# Patient Record
Sex: FEMALE | Race: WHITE | NOT HISPANIC OR LATINO | Employment: OTHER | URBAN - METROPOLITAN AREA
[De-identification: names, ages, dates, MRNs, and addresses within clinical notes are randomized per-mention and may not be internally consistent; named-entity substitution may affect disease eponyms.]

---

## 2017-01-06 ENCOUNTER — HOME CARE/HOSPICE - HEALTHEAST (OUTPATIENT)
Dept: HOME HEALTH SERVICES | Facility: HOME HEALTH | Age: 82
End: 2017-01-06

## 2017-01-09 ENCOUNTER — HOME CARE/HOSPICE - HEALTHEAST (OUTPATIENT)
Dept: HOME HEALTH SERVICES | Facility: HOME HEALTH | Age: 82
End: 2017-01-09

## 2017-01-13 ENCOUNTER — HOME CARE/HOSPICE - HEALTHEAST (OUTPATIENT)
Dept: HOME HEALTH SERVICES | Facility: HOME HEALTH | Age: 82
End: 2017-01-13

## 2017-01-17 ENCOUNTER — HOME CARE/HOSPICE - HEALTHEAST (OUTPATIENT)
Dept: HOME HEALTH SERVICES | Facility: HOME HEALTH | Age: 82
End: 2017-01-17

## 2017-01-20 ENCOUNTER — HOME CARE/HOSPICE - HEALTHEAST (OUTPATIENT)
Dept: HOME HEALTH SERVICES | Facility: HOME HEALTH | Age: 82
End: 2017-01-20

## 2017-01-25 ENCOUNTER — HOME CARE/HOSPICE - HEALTHEAST (OUTPATIENT)
Dept: HOME HEALTH SERVICES | Facility: HOME HEALTH | Age: 82
End: 2017-01-25

## 2017-01-26 ENCOUNTER — HOME CARE/HOSPICE - HEALTHEAST (OUTPATIENT)
Dept: HOME HEALTH SERVICES | Facility: HOME HEALTH | Age: 82
End: 2017-01-26

## 2017-01-31 ENCOUNTER — HOME CARE/HOSPICE - HEALTHEAST (OUTPATIENT)
Dept: HOME HEALTH SERVICES | Facility: HOME HEALTH | Age: 82
End: 2017-01-31

## 2017-02-08 ENCOUNTER — HOME CARE/HOSPICE - HEALTHEAST (OUTPATIENT)
Dept: HOME HEALTH SERVICES | Facility: HOME HEALTH | Age: 82
End: 2017-02-08

## 2017-02-08 ASSESSMENT — MIFFLIN-ST. JEOR: SCORE: 1177.92

## 2017-02-09 ENCOUNTER — HOME CARE/HOSPICE - HEALTHEAST (OUTPATIENT)
Dept: HOME HEALTH SERVICES | Facility: HOME HEALTH | Age: 82
End: 2017-02-09

## 2017-02-10 ENCOUNTER — HOME CARE/HOSPICE - HEALTHEAST (OUTPATIENT)
Dept: HOME HEALTH SERVICES | Facility: HOME HEALTH | Age: 82
End: 2017-02-10

## 2017-02-20 ENCOUNTER — HOME CARE/HOSPICE - HEALTHEAST (OUTPATIENT)
Dept: HOME HEALTH SERVICES | Facility: HOME HEALTH | Age: 82
End: 2017-02-20

## 2017-03-06 ENCOUNTER — HOME CARE/HOSPICE - HEALTHEAST (OUTPATIENT)
Dept: HOME HEALTH SERVICES | Facility: HOME HEALTH | Age: 82
End: 2017-03-06

## 2017-03-07 ENCOUNTER — HOME CARE/HOSPICE - HEALTHEAST (OUTPATIENT)
Dept: HOME HEALTH SERVICES | Facility: HOME HEALTH | Age: 82
End: 2017-03-07

## 2017-03-20 ENCOUNTER — HOME CARE/HOSPICE - HEALTHEAST (OUTPATIENT)
Dept: HOME HEALTH SERVICES | Facility: HOME HEALTH | Age: 82
End: 2017-03-20

## 2017-03-31 ENCOUNTER — HOME CARE/HOSPICE - HEALTHEAST (OUTPATIENT)
Dept: HOME HEALTH SERVICES | Facility: HOME HEALTH | Age: 82
End: 2017-03-31

## 2017-04-05 ENCOUNTER — HOME CARE/HOSPICE - HEALTHEAST (OUTPATIENT)
Dept: HOME HEALTH SERVICES | Facility: HOME HEALTH | Age: 82
End: 2017-04-05

## 2017-04-07 ENCOUNTER — HOME CARE/HOSPICE - HEALTHEAST (OUTPATIENT)
Dept: HOME HEALTH SERVICES | Facility: HOME HEALTH | Age: 82
End: 2017-04-07

## 2017-04-13 ENCOUNTER — HOME CARE/HOSPICE - HEALTHEAST (OUTPATIENT)
Dept: HOME HEALTH SERVICES | Facility: HOME HEALTH | Age: 82
End: 2017-04-13

## 2017-04-14 ENCOUNTER — HOME CARE/HOSPICE - HEALTHEAST (OUTPATIENT)
Dept: HOME HEALTH SERVICES | Facility: HOME HEALTH | Age: 82
End: 2017-04-14

## 2017-04-19 ENCOUNTER — HOME CARE/HOSPICE - HEALTHEAST (OUTPATIENT)
Dept: HOME HEALTH SERVICES | Facility: HOME HEALTH | Age: 82
End: 2017-04-19

## 2017-04-26 ENCOUNTER — HOME CARE/HOSPICE - HEALTHEAST (OUTPATIENT)
Dept: HOME HEALTH SERVICES | Facility: HOME HEALTH | Age: 82
End: 2017-04-26

## 2017-05-08 ENCOUNTER — HOME CARE/HOSPICE - HEALTHEAST (OUTPATIENT)
Dept: HOME HEALTH SERVICES | Facility: HOME HEALTH | Age: 82
End: 2017-05-08

## 2017-05-18 ENCOUNTER — HOME CARE/HOSPICE - HEALTHEAST (OUTPATIENT)
Dept: HOME HEALTH SERVICES | Facility: HOME HEALTH | Age: 82
End: 2017-05-18

## 2017-05-29 ENCOUNTER — HOME CARE/HOSPICE - HEALTHEAST (OUTPATIENT)
Dept: HOME HEALTH SERVICES | Facility: HOME HEALTH | Age: 82
End: 2017-05-29

## 2017-05-30 ENCOUNTER — HOME CARE/HOSPICE - HEALTHEAST (OUTPATIENT)
Dept: HOME HEALTH SERVICES | Facility: HOME HEALTH | Age: 82
End: 2017-05-30

## 2017-06-02 ENCOUNTER — COMMUNICATION - HEALTHEAST (OUTPATIENT)
Dept: HOME HEALTH SERVICES | Facility: HOME HEALTH | Age: 82
End: 2017-06-02

## 2017-06-05 ENCOUNTER — HOME CARE/HOSPICE - HEALTHEAST (OUTPATIENT)
Dept: HOME HEALTH SERVICES | Facility: HOME HEALTH | Age: 82
End: 2017-06-05

## 2017-06-06 ENCOUNTER — HOME CARE/HOSPICE - HEALTHEAST (OUTPATIENT)
Dept: HOME HEALTH SERVICES | Facility: HOME HEALTH | Age: 82
End: 2017-06-06

## 2017-06-15 ENCOUNTER — HOME CARE/HOSPICE - HEALTHEAST (OUTPATIENT)
Dept: HOME HEALTH SERVICES | Facility: HOME HEALTH | Age: 82
End: 2017-06-15

## 2017-06-22 ENCOUNTER — HOME CARE/HOSPICE - HEALTHEAST (OUTPATIENT)
Dept: HOME HEALTH SERVICES | Facility: HOME HEALTH | Age: 82
End: 2017-06-22

## 2017-06-29 ENCOUNTER — HOME CARE/HOSPICE - HEALTHEAST (OUTPATIENT)
Dept: HOME HEALTH SERVICES | Facility: HOME HEALTH | Age: 82
End: 2017-06-29

## 2017-07-11 ENCOUNTER — HOME CARE/HOSPICE - HEALTHEAST (OUTPATIENT)
Dept: HOME HEALTH SERVICES | Facility: HOME HEALTH | Age: 82
End: 2017-07-11

## 2017-07-13 ENCOUNTER — HOME CARE/HOSPICE - HEALTHEAST (OUTPATIENT)
Dept: HOME HEALTH SERVICES | Facility: HOME HEALTH | Age: 82
End: 2017-07-13

## 2017-07-14 ENCOUNTER — HOME CARE/HOSPICE - HEALTHEAST (OUTPATIENT)
Dept: HOME HEALTH SERVICES | Facility: HOME HEALTH | Age: 82
End: 2017-07-14

## 2017-07-17 ENCOUNTER — HOME CARE/HOSPICE - HEALTHEAST (OUTPATIENT)
Dept: HOME HEALTH SERVICES | Facility: HOME HEALTH | Age: 82
End: 2017-07-17

## 2017-07-18 ENCOUNTER — HOME CARE/HOSPICE - HEALTHEAST (OUTPATIENT)
Dept: HOME HEALTH SERVICES | Facility: HOME HEALTH | Age: 82
End: 2017-07-18

## 2017-07-18 ENCOUNTER — COMMUNICATION - HEALTHEAST (OUTPATIENT)
Dept: PHYSICAL THERAPY | Age: 82
End: 2017-07-18

## 2017-07-21 ENCOUNTER — HOME CARE/HOSPICE - HEALTHEAST (OUTPATIENT)
Dept: HOME HEALTH SERVICES | Facility: HOME HEALTH | Age: 82
End: 2017-07-21

## 2017-07-24 ENCOUNTER — HOME CARE/HOSPICE - HEALTHEAST (OUTPATIENT)
Dept: HOME HEALTH SERVICES | Facility: HOME HEALTH | Age: 82
End: 2017-07-24

## 2017-07-26 ENCOUNTER — HOME CARE/HOSPICE - HEALTHEAST (OUTPATIENT)
Dept: HOME HEALTH SERVICES | Facility: HOME HEALTH | Age: 82
End: 2017-07-26

## 2017-07-27 ENCOUNTER — HOME CARE/HOSPICE - HEALTHEAST (OUTPATIENT)
Dept: HOME HEALTH SERVICES | Facility: HOME HEALTH | Age: 82
End: 2017-07-27

## 2017-07-28 ENCOUNTER — HOME CARE/HOSPICE - HEALTHEAST (OUTPATIENT)
Dept: HOME HEALTH SERVICES | Facility: HOME HEALTH | Age: 82
End: 2017-07-28

## 2017-08-01 ENCOUNTER — HOME CARE/HOSPICE - HEALTHEAST (OUTPATIENT)
Dept: HOME HEALTH SERVICES | Facility: HOME HEALTH | Age: 82
End: 2017-08-01

## 2017-08-02 ENCOUNTER — HOME CARE/HOSPICE - HEALTHEAST (OUTPATIENT)
Dept: HOME HEALTH SERVICES | Facility: HOME HEALTH | Age: 82
End: 2017-08-02

## 2017-08-03 ENCOUNTER — HOME CARE/HOSPICE - HEALTHEAST (OUTPATIENT)
Dept: HOME HEALTH SERVICES | Facility: HOME HEALTH | Age: 82
End: 2017-08-03

## 2017-08-04 ENCOUNTER — HOME CARE/HOSPICE - HEALTHEAST (OUTPATIENT)
Dept: HOME HEALTH SERVICES | Facility: HOME HEALTH | Age: 82
End: 2017-08-04

## 2017-08-07 ENCOUNTER — HOME CARE/HOSPICE - HEALTHEAST (OUTPATIENT)
Dept: HOME HEALTH SERVICES | Facility: HOME HEALTH | Age: 82
End: 2017-08-07

## 2017-08-09 ENCOUNTER — HOME CARE/HOSPICE - HEALTHEAST (OUTPATIENT)
Dept: HOME HEALTH SERVICES | Facility: HOME HEALTH | Age: 82
End: 2017-08-09

## 2017-08-10 ENCOUNTER — HOME CARE/HOSPICE - HEALTHEAST (OUTPATIENT)
Dept: HOME HEALTH SERVICES | Facility: HOME HEALTH | Age: 82
End: 2017-08-10

## 2017-08-17 ENCOUNTER — HOME CARE/HOSPICE - HEALTHEAST (OUTPATIENT)
Dept: HOME HEALTH SERVICES | Facility: HOME HEALTH | Age: 82
End: 2017-08-17

## 2017-08-24 ENCOUNTER — HOME CARE/HOSPICE - HEALTHEAST (OUTPATIENT)
Dept: HOME HEALTH SERVICES | Facility: HOME HEALTH | Age: 82
End: 2017-08-24

## 2017-08-25 ENCOUNTER — HOME CARE/HOSPICE - HEALTHEAST (OUTPATIENT)
Dept: HOME HEALTH SERVICES | Facility: HOME HEALTH | Age: 82
End: 2017-08-25

## 2017-08-31 ENCOUNTER — HOME CARE/HOSPICE - HEALTHEAST (OUTPATIENT)
Dept: HOME HEALTH SERVICES | Facility: HOME HEALTH | Age: 82
End: 2017-08-31

## 2017-09-01 ENCOUNTER — HOME CARE/HOSPICE - HEALTHEAST (OUTPATIENT)
Dept: HOME HEALTH SERVICES | Facility: HOME HEALTH | Age: 82
End: 2017-09-01

## 2017-09-02 ENCOUNTER — HOME CARE/HOSPICE - HEALTHEAST (OUTPATIENT)
Dept: HOME HEALTH SERVICES | Facility: HOME HEALTH | Age: 82
End: 2017-09-02

## 2017-09-05 ENCOUNTER — HOME CARE/HOSPICE - HEALTHEAST (OUTPATIENT)
Dept: HOME HEALTH SERVICES | Facility: HOME HEALTH | Age: 82
End: 2017-09-05

## 2017-09-06 ENCOUNTER — COMMUNICATION - HEALTHEAST (OUTPATIENT)
Dept: PHYSICAL THERAPY | Age: 82
End: 2017-09-06

## 2017-09-06 ENCOUNTER — HOME CARE/HOSPICE - HEALTHEAST (OUTPATIENT)
Dept: HOME HEALTH SERVICES | Facility: HOME HEALTH | Age: 82
End: 2017-09-06

## 2017-09-07 ENCOUNTER — HOME CARE/HOSPICE - HEALTHEAST (OUTPATIENT)
Dept: HOME HEALTH SERVICES | Facility: HOME HEALTH | Age: 82
End: 2017-09-07

## 2017-09-12 ENCOUNTER — HOME CARE/HOSPICE - HEALTHEAST (OUTPATIENT)
Dept: HOME HEALTH SERVICES | Facility: HOME HEALTH | Age: 82
End: 2017-09-12

## 2017-09-15 ENCOUNTER — HOME CARE/HOSPICE - HEALTHEAST (OUTPATIENT)
Dept: HOME HEALTH SERVICES | Facility: HOME HEALTH | Age: 82
End: 2017-09-15

## 2017-09-19 ENCOUNTER — HOME CARE/HOSPICE - HEALTHEAST (OUTPATIENT)
Dept: HOME HEALTH SERVICES | Facility: HOME HEALTH | Age: 82
End: 2017-09-19

## 2017-09-20 ENCOUNTER — HOME CARE/HOSPICE - HEALTHEAST (OUTPATIENT)
Dept: HOME HEALTH SERVICES | Facility: HOME HEALTH | Age: 82
End: 2017-09-20

## 2017-09-22 ENCOUNTER — HOME CARE/HOSPICE - HEALTHEAST (OUTPATIENT)
Dept: HOME HEALTH SERVICES | Facility: HOME HEALTH | Age: 82
End: 2017-09-22

## 2017-09-25 ENCOUNTER — HOME CARE/HOSPICE - HEALTHEAST (OUTPATIENT)
Dept: HOME HEALTH SERVICES | Facility: HOME HEALTH | Age: 82
End: 2017-09-25

## 2017-09-26 ENCOUNTER — HOME CARE/HOSPICE - HEALTHEAST (OUTPATIENT)
Dept: HOME HEALTH SERVICES | Facility: HOME HEALTH | Age: 82
End: 2017-09-26

## 2017-09-28 ENCOUNTER — HOME CARE/HOSPICE - HEALTHEAST (OUTPATIENT)
Dept: HOME HEALTH SERVICES | Facility: HOME HEALTH | Age: 82
End: 2017-09-28

## 2017-10-02 ENCOUNTER — HOME CARE/HOSPICE - HEALTHEAST (OUTPATIENT)
Dept: HOME HEALTH SERVICES | Facility: HOME HEALTH | Age: 82
End: 2017-10-02

## 2017-10-16 ENCOUNTER — HOME CARE/HOSPICE - HEALTHEAST (OUTPATIENT)
Dept: HOME HEALTH SERVICES | Facility: HOME HEALTH | Age: 82
End: 2017-10-16

## 2017-10-20 ENCOUNTER — HOME CARE/HOSPICE - HEALTHEAST (OUTPATIENT)
Dept: HOME HEALTH SERVICES | Facility: HOME HEALTH | Age: 82
End: 2017-10-20

## 2017-10-21 ENCOUNTER — COMMUNICATION - HEALTHEAST (OUTPATIENT)
Dept: INTERNAL MEDICINE | Facility: CLINIC | Age: 82
End: 2017-10-21

## 2017-10-30 ENCOUNTER — HOME CARE/HOSPICE - HEALTHEAST (OUTPATIENT)
Dept: HOME HEALTH SERVICES | Facility: HOME HEALTH | Age: 82
End: 2017-10-30

## 2017-10-31 ENCOUNTER — HOME CARE/HOSPICE - HEALTHEAST (OUTPATIENT)
Dept: HOME HEALTH SERVICES | Facility: HOME HEALTH | Age: 82
End: 2017-10-31

## 2017-11-13 ENCOUNTER — HOME CARE/HOSPICE - HEALTHEAST (OUTPATIENT)
Dept: HOME HEALTH SERVICES | Facility: HOME HEALTH | Age: 82
End: 2017-11-13

## 2017-11-27 ENCOUNTER — HOME CARE/HOSPICE - HEALTHEAST (OUTPATIENT)
Dept: HOME HEALTH SERVICES | Facility: HOME HEALTH | Age: 82
End: 2017-11-27

## 2017-11-28 ENCOUNTER — HOME CARE/HOSPICE - HEALTHEAST (OUTPATIENT)
Dept: HOME HEALTH SERVICES | Facility: HOME HEALTH | Age: 82
End: 2017-11-28

## 2017-12-05 ENCOUNTER — HOME CARE/HOSPICE - HEALTHEAST (OUTPATIENT)
Dept: HOME HEALTH SERVICES | Facility: HOME HEALTH | Age: 82
End: 2017-12-05

## 2017-12-08 ENCOUNTER — HOME CARE/HOSPICE - HEALTHEAST (OUTPATIENT)
Dept: HOME HEALTH SERVICES | Facility: HOME HEALTH | Age: 82
End: 2017-12-08

## 2017-12-11 ENCOUNTER — HOME CARE/HOSPICE - HEALTHEAST (OUTPATIENT)
Dept: HOME HEALTH SERVICES | Facility: HOME HEALTH | Age: 82
End: 2017-12-11

## 2017-12-12 ENCOUNTER — HOME CARE/HOSPICE - HEALTHEAST (OUTPATIENT)
Dept: HOME HEALTH SERVICES | Facility: HOME HEALTH | Age: 82
End: 2017-12-12

## 2017-12-14 ENCOUNTER — HOME CARE/HOSPICE - HEALTHEAST (OUTPATIENT)
Dept: HOME HEALTH SERVICES | Facility: HOME HEALTH | Age: 82
End: 2017-12-14

## 2017-12-17 ENCOUNTER — HOME CARE/HOSPICE - HEALTHEAST (OUTPATIENT)
Dept: HOME HEALTH SERVICES | Facility: HOME HEALTH | Age: 82
End: 2017-12-17

## 2017-12-18 ENCOUNTER — HOME CARE/HOSPICE - HEALTHEAST (OUTPATIENT)
Dept: HOME HEALTH SERVICES | Facility: HOME HEALTH | Age: 82
End: 2017-12-18

## 2017-12-22 ENCOUNTER — HOME CARE/HOSPICE - HEALTHEAST (OUTPATIENT)
Dept: HOME HEALTH SERVICES | Facility: HOME HEALTH | Age: 82
End: 2017-12-22

## 2017-12-23 ENCOUNTER — HOME CARE/HOSPICE - HEALTHEAST (OUTPATIENT)
Dept: HOME HEALTH SERVICES | Facility: HOME HEALTH | Age: 82
End: 2017-12-23

## 2017-12-27 ENCOUNTER — HOME CARE/HOSPICE - HEALTHEAST (OUTPATIENT)
Dept: HOME HEALTH SERVICES | Facility: HOME HEALTH | Age: 82
End: 2017-12-27

## 2017-12-28 ENCOUNTER — HOME CARE/HOSPICE - HEALTHEAST (OUTPATIENT)
Dept: HOME HEALTH SERVICES | Facility: HOME HEALTH | Age: 82
End: 2017-12-28

## 2018-01-04 ENCOUNTER — HOME CARE/HOSPICE - HEALTHEAST (OUTPATIENT)
Dept: HOME HEALTH SERVICES | Facility: HOME HEALTH | Age: 83
End: 2018-01-04

## 2018-01-08 ENCOUNTER — HOME CARE/HOSPICE - HEALTHEAST (OUTPATIENT)
Dept: HOME HEALTH SERVICES | Facility: HOME HEALTH | Age: 83
End: 2018-01-08

## 2018-01-23 ENCOUNTER — HOME CARE/HOSPICE - HEALTHEAST (OUTPATIENT)
Dept: HOME HEALTH SERVICES | Facility: HOME HEALTH | Age: 83
End: 2018-01-23

## 2018-02-01 ENCOUNTER — HOME CARE/HOSPICE - HEALTHEAST (OUTPATIENT)
Dept: HOME HEALTH SERVICES | Facility: HOME HEALTH | Age: 83
End: 2018-02-01

## 2018-02-12 ENCOUNTER — HOME CARE/HOSPICE - HEALTHEAST (OUTPATIENT)
Dept: HOME HEALTH SERVICES | Facility: HOME HEALTH | Age: 83
End: 2018-02-12

## 2018-02-26 ENCOUNTER — HOME CARE/HOSPICE - HEALTHEAST (OUTPATIENT)
Dept: HOME HEALTH SERVICES | Facility: HOME HEALTH | Age: 83
End: 2018-02-26

## 2018-03-12 ENCOUNTER — HOME CARE/HOSPICE - HEALTHEAST (OUTPATIENT)
Dept: HOME HEALTH SERVICES | Facility: HOME HEALTH | Age: 83
End: 2018-03-12

## 2018-03-13 ENCOUNTER — RECORDS - HEALTHEAST (OUTPATIENT)
Dept: LAB | Facility: HOSPITAL | Age: 83
End: 2018-03-13

## 2018-03-13 ENCOUNTER — HOME CARE/HOSPICE - HEALTHEAST (OUTPATIENT)
Dept: HOME HEALTH SERVICES | Facility: HOME HEALTH | Age: 83
End: 2018-03-13

## 2018-03-14 LAB — BACTERIA SPEC CULT: NO GROWTH

## 2018-03-26 ENCOUNTER — HOME CARE/HOSPICE - HEALTHEAST (OUTPATIENT)
Dept: HOME HEALTH SERVICES | Facility: HOME HEALTH | Age: 83
End: 2018-03-26

## 2018-03-29 ENCOUNTER — HOME CARE/HOSPICE - HEALTHEAST (OUTPATIENT)
Dept: HOME HEALTH SERVICES | Facility: HOME HEALTH | Age: 83
End: 2018-03-29

## 2018-04-04 ENCOUNTER — HOME CARE/HOSPICE - HEALTHEAST (OUTPATIENT)
Dept: HOME HEALTH SERVICES | Facility: HOME HEALTH | Age: 83
End: 2018-04-04

## 2018-04-05 ENCOUNTER — HOME CARE/HOSPICE - HEALTHEAST (OUTPATIENT)
Dept: HOME HEALTH SERVICES | Facility: HOME HEALTH | Age: 83
End: 2018-04-05

## 2018-04-09 ENCOUNTER — HOME CARE/HOSPICE - HEALTHEAST (OUTPATIENT)
Dept: HOME HEALTH SERVICES | Facility: HOME HEALTH | Age: 83
End: 2018-04-09

## 2018-04-10 ENCOUNTER — HOME CARE/HOSPICE - HEALTHEAST (OUTPATIENT)
Dept: HOME HEALTH SERVICES | Facility: HOME HEALTH | Age: 83
End: 2018-04-10

## 2018-04-18 ENCOUNTER — HOME CARE/HOSPICE - HEALTHEAST (OUTPATIENT)
Dept: HOME HEALTH SERVICES | Facility: HOME HEALTH | Age: 83
End: 2018-04-18

## 2018-04-23 ENCOUNTER — HOME CARE/HOSPICE - HEALTHEAST (OUTPATIENT)
Dept: HOME HEALTH SERVICES | Facility: HOME HEALTH | Age: 83
End: 2018-04-23

## 2018-05-07 ENCOUNTER — HOME CARE/HOSPICE - HEALTHEAST (OUTPATIENT)
Dept: HOME HEALTH SERVICES | Facility: HOME HEALTH | Age: 83
End: 2018-05-07

## 2018-05-15 ENCOUNTER — HOME CARE/HOSPICE - HEALTHEAST (OUTPATIENT)
Dept: HOME HEALTH SERVICES | Facility: HOME HEALTH | Age: 83
End: 2018-05-15

## 2018-05-21 ENCOUNTER — HOME CARE/HOSPICE - HEALTHEAST (OUTPATIENT)
Dept: HOME HEALTH SERVICES | Facility: HOME HEALTH | Age: 83
End: 2018-05-21

## 2018-06-04 ENCOUNTER — HOME CARE/HOSPICE - HEALTHEAST (OUTPATIENT)
Dept: HOME HEALTH SERVICES | Facility: HOME HEALTH | Age: 83
End: 2018-06-04

## 2018-06-07 ENCOUNTER — HOME CARE/HOSPICE - HEALTHEAST (OUTPATIENT)
Dept: HOME HEALTH SERVICES | Facility: HOME HEALTH | Age: 83
End: 2018-06-07

## 2018-06-08 ENCOUNTER — HOME CARE/HOSPICE - HEALTHEAST (OUTPATIENT)
Dept: HOME HEALTH SERVICES | Facility: HOME HEALTH | Age: 83
End: 2018-06-08

## 2018-06-13 ENCOUNTER — HOME CARE/HOSPICE - HEALTHEAST (OUTPATIENT)
Dept: HOME HEALTH SERVICES | Facility: HOME HEALTH | Age: 83
End: 2018-06-13

## 2018-06-13 ENCOUNTER — COMMUNICATION - HEALTHEAST (OUTPATIENT)
Dept: INTERNAL MEDICINE | Facility: CLINIC | Age: 83
End: 2018-06-13

## 2018-06-20 ENCOUNTER — HOME CARE/HOSPICE - HEALTHEAST (OUTPATIENT)
Dept: HOME HEALTH SERVICES | Facility: HOME HEALTH | Age: 83
End: 2018-06-20

## 2018-06-20 ENCOUNTER — COMMUNICATION - HEALTHEAST (OUTPATIENT)
Dept: INTERNAL MEDICINE | Facility: CLINIC | Age: 83
End: 2018-06-20

## 2018-06-21 ENCOUNTER — HOME CARE/HOSPICE - HEALTHEAST (OUTPATIENT)
Dept: HOME HEALTH SERVICES | Facility: HOME HEALTH | Age: 83
End: 2018-06-21

## 2018-07-03 ENCOUNTER — HOME CARE/HOSPICE - HEALTHEAST (OUTPATIENT)
Dept: HOME HEALTH SERVICES | Facility: HOME HEALTH | Age: 83
End: 2018-07-03

## 2018-07-11 ENCOUNTER — HOME CARE/HOSPICE - HEALTHEAST (OUTPATIENT)
Dept: HOME HEALTH SERVICES | Facility: HOME HEALTH | Age: 83
End: 2018-07-11

## 2018-07-12 ENCOUNTER — HOME CARE/HOSPICE - HEALTHEAST (OUTPATIENT)
Dept: HOME HEALTH SERVICES | Facility: HOME HEALTH | Age: 83
End: 2018-07-12

## 2018-07-13 ENCOUNTER — HOME CARE/HOSPICE - HEALTHEAST (OUTPATIENT)
Dept: HOME HEALTH SERVICES | Facility: HOME HEALTH | Age: 83
End: 2018-07-13

## 2018-07-14 ENCOUNTER — HOME CARE/HOSPICE - HEALTHEAST (OUTPATIENT)
Dept: HOME HEALTH SERVICES | Facility: HOME HEALTH | Age: 83
End: 2018-07-14

## 2018-07-17 ENCOUNTER — HOME CARE/HOSPICE - HEALTHEAST (OUTPATIENT)
Dept: HOME HEALTH SERVICES | Facility: HOME HEALTH | Age: 83
End: 2018-07-17

## 2018-07-17 ENCOUNTER — RECORDS - HEALTHEAST (OUTPATIENT)
Dept: ADMINISTRATIVE | Facility: OTHER | Age: 83
End: 2018-07-17

## 2018-07-20 ENCOUNTER — HOME CARE/HOSPICE - HEALTHEAST (OUTPATIENT)
Dept: HOME HEALTH SERVICES | Facility: HOME HEALTH | Age: 83
End: 2018-07-20

## 2018-07-22 ENCOUNTER — HOME CARE/HOSPICE - HEALTHEAST (OUTPATIENT)
Dept: HOME HEALTH SERVICES | Facility: HOME HEALTH | Age: 83
End: 2018-07-22

## 2018-07-27 ENCOUNTER — HOME CARE/HOSPICE - HEALTHEAST (OUTPATIENT)
Dept: HOME HEALTH SERVICES | Facility: HOME HEALTH | Age: 83
End: 2018-07-27

## 2018-07-30 ENCOUNTER — HOME CARE/HOSPICE - HEALTHEAST (OUTPATIENT)
Dept: HOME HEALTH SERVICES | Facility: HOME HEALTH | Age: 83
End: 2018-07-30

## 2018-07-31 ENCOUNTER — HOME CARE/HOSPICE - HEALTHEAST (OUTPATIENT)
Dept: HOME HEALTH SERVICES | Facility: HOME HEALTH | Age: 83
End: 2018-07-31

## 2018-08-01 ENCOUNTER — HOME CARE/HOSPICE - HEALTHEAST (OUTPATIENT)
Dept: HOME HEALTH SERVICES | Facility: HOME HEALTH | Age: 83
End: 2018-08-01

## 2018-08-03 ENCOUNTER — HOME CARE/HOSPICE - HEALTHEAST (OUTPATIENT)
Dept: HOME HEALTH SERVICES | Facility: HOME HEALTH | Age: 83
End: 2018-08-03

## 2018-08-06 ENCOUNTER — HOME CARE/HOSPICE - HEALTHEAST (OUTPATIENT)
Dept: HOME HEALTH SERVICES | Facility: HOME HEALTH | Age: 83
End: 2018-08-06

## 2018-08-08 ENCOUNTER — HOME CARE/HOSPICE - HEALTHEAST (OUTPATIENT)
Dept: HOME HEALTH SERVICES | Facility: HOME HEALTH | Age: 83
End: 2018-08-08

## 2018-08-10 ENCOUNTER — HOME CARE/HOSPICE - HEALTHEAST (OUTPATIENT)
Dept: HOME HEALTH SERVICES | Facility: HOME HEALTH | Age: 83
End: 2018-08-10

## 2018-08-13 ENCOUNTER — HOME CARE/HOSPICE - HEALTHEAST (OUTPATIENT)
Dept: HOME HEALTH SERVICES | Facility: HOME HEALTH | Age: 83
End: 2018-08-13

## 2018-08-14 ENCOUNTER — HOME CARE/HOSPICE - HEALTHEAST (OUTPATIENT)
Dept: HOME HEALTH SERVICES | Facility: HOME HEALTH | Age: 83
End: 2018-08-14

## 2018-08-16 ENCOUNTER — HOME CARE/HOSPICE - HEALTHEAST (OUTPATIENT)
Dept: HOME HEALTH SERVICES | Facility: HOME HEALTH | Age: 83
End: 2018-08-16

## 2018-08-20 ENCOUNTER — HOME CARE/HOSPICE - HEALTHEAST (OUTPATIENT)
Dept: HOME HEALTH SERVICES | Facility: HOME HEALTH | Age: 83
End: 2018-08-20

## 2018-08-21 ENCOUNTER — HOME CARE/HOSPICE - HEALTHEAST (OUTPATIENT)
Dept: HOME HEALTH SERVICES | Facility: HOME HEALTH | Age: 83
End: 2018-08-21

## 2018-08-24 ENCOUNTER — HOME CARE/HOSPICE - HEALTHEAST (OUTPATIENT)
Dept: HOME HEALTH SERVICES | Facility: HOME HEALTH | Age: 83
End: 2018-08-24

## 2018-08-29 ENCOUNTER — HOME CARE/HOSPICE - HEALTHEAST (OUTPATIENT)
Dept: HOME HEALTH SERVICES | Facility: HOME HEALTH | Age: 83
End: 2018-08-29

## 2018-09-03 ENCOUNTER — HOME CARE/HOSPICE - HEALTHEAST (OUTPATIENT)
Dept: HOME HEALTH SERVICES | Facility: HOME HEALTH | Age: 83
End: 2018-09-03

## 2018-09-04 ENCOUNTER — HOME CARE/HOSPICE - HEALTHEAST (OUTPATIENT)
Dept: HOME HEALTH SERVICES | Facility: HOME HEALTH | Age: 83
End: 2018-09-04

## 2018-09-06 ENCOUNTER — HOME CARE/HOSPICE - HEALTHEAST (OUTPATIENT)
Dept: HOME HEALTH SERVICES | Facility: HOME HEALTH | Age: 83
End: 2018-09-06

## 2018-09-07 ENCOUNTER — HOME CARE/HOSPICE - HEALTHEAST (OUTPATIENT)
Dept: HOME HEALTH SERVICES | Facility: HOME HEALTH | Age: 83
End: 2018-09-07

## 2018-09-17 ENCOUNTER — HOME CARE/HOSPICE - HEALTHEAST (OUTPATIENT)
Dept: HOME HEALTH SERVICES | Facility: HOME HEALTH | Age: 83
End: 2018-09-17

## 2018-10-01 ENCOUNTER — HOME CARE/HOSPICE - HEALTHEAST (OUTPATIENT)
Dept: HOME HEALTH SERVICES | Facility: HOME HEALTH | Age: 83
End: 2018-10-01

## 2018-10-15 ENCOUNTER — HOME CARE/HOSPICE - HEALTHEAST (OUTPATIENT)
Dept: HOME HEALTH SERVICES | Facility: HOME HEALTH | Age: 83
End: 2018-10-15

## 2018-10-19 ENCOUNTER — HOME CARE/HOSPICE - HEALTHEAST (OUTPATIENT)
Dept: HOME HEALTH SERVICES | Facility: HOME HEALTH | Age: 83
End: 2018-10-19

## 2018-10-29 ENCOUNTER — HOME CARE/HOSPICE - HEALTHEAST (OUTPATIENT)
Dept: HOME HEALTH SERVICES | Facility: HOME HEALTH | Age: 83
End: 2018-10-29

## 2018-11-12 ENCOUNTER — HOME CARE/HOSPICE - HEALTHEAST (OUTPATIENT)
Dept: HOME HEALTH SERVICES | Facility: HOME HEALTH | Age: 83
End: 2018-11-12

## 2018-11-26 ENCOUNTER — HOME CARE/HOSPICE - HEALTHEAST (OUTPATIENT)
Dept: HOME HEALTH SERVICES | Facility: HOME HEALTH | Age: 83
End: 2018-11-26

## 2018-12-10 ENCOUNTER — HOME CARE/HOSPICE - HEALTHEAST (OUTPATIENT)
Dept: HOME HEALTH SERVICES | Facility: HOME HEALTH | Age: 83
End: 2018-12-10

## 2018-12-24 ENCOUNTER — HOME CARE/HOSPICE - HEALTHEAST (OUTPATIENT)
Dept: HOME HEALTH SERVICES | Facility: HOME HEALTH | Age: 83
End: 2018-12-24

## 2019-01-07 ENCOUNTER — HOME CARE/HOSPICE - HEALTHEAST (OUTPATIENT)
Dept: HOME HEALTH SERVICES | Facility: HOME HEALTH | Age: 84
End: 2019-01-07

## 2019-01-11 ENCOUNTER — HOME CARE/HOSPICE - HEALTHEAST (OUTPATIENT)
Dept: HOME HEALTH SERVICES | Facility: HOME HEALTH | Age: 84
End: 2019-01-11

## 2019-01-21 ENCOUNTER — HOME CARE/HOSPICE - HEALTHEAST (OUTPATIENT)
Dept: HOME HEALTH SERVICES | Facility: HOME HEALTH | Age: 84
End: 2019-01-21

## 2019-01-28 ENCOUNTER — HOME CARE/HOSPICE - HEALTHEAST (OUTPATIENT)
Dept: HOME HEALTH SERVICES | Facility: HOME HEALTH | Age: 84
End: 2019-01-28

## 2019-02-04 ENCOUNTER — HOME CARE/HOSPICE - HEALTHEAST (OUTPATIENT)
Dept: HOME HEALTH SERVICES | Facility: HOME HEALTH | Age: 84
End: 2019-02-04

## 2019-02-10 ENCOUNTER — HOME CARE/HOSPICE - HEALTHEAST (OUTPATIENT)
Dept: HOME HEALTH SERVICES | Facility: HOME HEALTH | Age: 84
End: 2019-02-10

## 2019-02-16 ENCOUNTER — HOME CARE/HOSPICE - HEALTHEAST (OUTPATIENT)
Dept: HOME HEALTH SERVICES | Facility: HOME HEALTH | Age: 84
End: 2019-02-16

## 2019-02-18 ENCOUNTER — HOME CARE/HOSPICE - HEALTHEAST (OUTPATIENT)
Dept: HOME HEALTH SERVICES | Facility: HOME HEALTH | Age: 84
End: 2019-02-18

## 2019-03-04 ENCOUNTER — HOME CARE/HOSPICE - HEALTHEAST (OUTPATIENT)
Dept: HOME HEALTH SERVICES | Facility: HOME HEALTH | Age: 84
End: 2019-03-04

## 2019-03-08 ENCOUNTER — HOME CARE/HOSPICE - HEALTHEAST (OUTPATIENT)
Dept: HOME HEALTH SERVICES | Facility: HOME HEALTH | Age: 84
End: 2019-03-08

## 2019-03-18 ENCOUNTER — HOME CARE/HOSPICE - HEALTHEAST (OUTPATIENT)
Dept: HOME HEALTH SERVICES | Facility: HOME HEALTH | Age: 84
End: 2019-03-18

## 2019-04-01 ENCOUNTER — HOME CARE/HOSPICE - HEALTHEAST (OUTPATIENT)
Dept: HOME HEALTH SERVICES | Facility: HOME HEALTH | Age: 84
End: 2019-04-01

## 2019-04-01 ENCOUNTER — RECORDS - HEALTHEAST (OUTPATIENT)
Dept: ADMINISTRATIVE | Facility: OTHER | Age: 84
End: 2019-04-01

## 2019-04-13 ENCOUNTER — HOME CARE/HOSPICE - HEALTHEAST (OUTPATIENT)
Dept: HOME HEALTH SERVICES | Facility: HOME HEALTH | Age: 84
End: 2019-04-13

## 2019-04-14 ENCOUNTER — HOME CARE/HOSPICE - HEALTHEAST (OUTPATIENT)
Dept: HOME HEALTH SERVICES | Facility: HOME HEALTH | Age: 84
End: 2019-04-14

## 2019-04-15 ENCOUNTER — HOME CARE/HOSPICE - HEALTHEAST (OUTPATIENT)
Dept: HOME HEALTH SERVICES | Facility: HOME HEALTH | Age: 84
End: 2019-04-15

## 2019-04-16 ENCOUNTER — RECORDS - HEALTHEAST (OUTPATIENT)
Dept: LAB | Facility: CLINIC | Age: 84
End: 2019-04-16

## 2019-04-16 LAB
ALBUMIN UR-MCNC: ABNORMAL MG/DL
APPEARANCE UR: ABNORMAL
BILIRUB UR QL STRIP: NEGATIVE
COLOR UR AUTO: ABNORMAL
GLUCOSE UR STRIP-MCNC: NEGATIVE MG/DL
HGB UR QL STRIP: ABNORMAL
KETONES UR STRIP-MCNC: ABNORMAL MG/DL
LEUKOCYTE ESTERASE UR QL STRIP: ABNORMAL
NITRATE UR QL: NEGATIVE
PH UR STRIP: 6 [PH] (ref 4.5–8)
SP GR UR STRIP: 1.02 (ref 1–1.03)
UROBILINOGEN UR STRIP-ACNC: ABNORMAL

## 2019-04-17 ENCOUNTER — RECORDS - HEALTHEAST (OUTPATIENT)
Dept: LAB | Facility: CLINIC | Age: 84
End: 2019-04-17

## 2019-04-17 LAB
ANION GAP SERPL CALCULATED.3IONS-SCNC: 10 MMOL/L (ref 5–18)
BUN SERPL-MCNC: 18 MG/DL (ref 8–28)
CALCIUM SERPL-MCNC: 9.8 MG/DL (ref 8.5–10.5)
CHLORIDE BLD-SCNC: 100 MMOL/L (ref 98–107)
CO2 SERPL-SCNC: 26 MMOL/L (ref 22–31)
CREAT SERPL-MCNC: 0.91 MG/DL (ref 0.6–1.1)
ERYTHROCYTE [DISTWIDTH] IN BLOOD BY AUTOMATED COUNT: 13.8 % (ref 11–14.5)
GFR SERPL CREATININE-BSD FRML MDRD: 58 ML/MIN/1.73M2
GLUCOSE BLD-MCNC: 88 MG/DL (ref 70–125)
HCT VFR BLD AUTO: 37.4 % (ref 35–47)
HGB BLD-MCNC: 12.4 G/DL (ref 12–16)
MCH RBC QN AUTO: 31 PG (ref 27–34)
MCHC RBC AUTO-ENTMCNC: 33.2 G/DL (ref 32–36)
MCV RBC AUTO: 94 FL (ref 80–100)
PLATELET # BLD AUTO: 245 THOU/UL (ref 140–440)
PMV BLD AUTO: 9.4 FL (ref 8.5–12.5)
POTASSIUM BLD-SCNC: 3.6 MMOL/L (ref 3.5–5)
RBC # BLD AUTO: 4 MILL/UL (ref 3.8–5.4)
SODIUM SERPL-SCNC: 136 MMOL/L (ref 136–145)
WBC: 4.3 THOU/UL (ref 4–11)

## 2019-04-18 LAB — BACTERIA SPEC CULT: ABNORMAL

## 2019-04-22 ENCOUNTER — AMBULATORY - HEALTHEAST (OUTPATIENT)
Dept: OTHER | Facility: CLINIC | Age: 84
End: 2019-04-22

## 2019-04-23 ENCOUNTER — HOME CARE/HOSPICE - HEALTHEAST (OUTPATIENT)
Dept: HOME HEALTH SERVICES | Facility: HOME HEALTH | Age: 84
End: 2019-04-23

## 2019-04-26 ENCOUNTER — COMMUNICATION - HEALTHEAST (OUTPATIENT)
Dept: SCHEDULING | Facility: CLINIC | Age: 84
End: 2019-04-26

## 2019-04-26 ENCOUNTER — HOME CARE/HOSPICE - HEALTHEAST (OUTPATIENT)
Dept: HOME HEALTH SERVICES | Facility: HOME HEALTH | Age: 84
End: 2019-04-26

## 2019-04-29 ENCOUNTER — HOME CARE/HOSPICE - HEALTHEAST (OUTPATIENT)
Dept: HOME HEALTH SERVICES | Facility: HOME HEALTH | Age: 84
End: 2019-04-29

## 2019-05-01 ENCOUNTER — HOME CARE/HOSPICE - HEALTHEAST (OUTPATIENT)
Dept: HOME HEALTH SERVICES | Facility: HOME HEALTH | Age: 84
End: 2019-05-01

## 2019-05-02 ENCOUNTER — HOME CARE/HOSPICE - HEALTHEAST (OUTPATIENT)
Dept: HOME HEALTH SERVICES | Facility: HOME HEALTH | Age: 84
End: 2019-05-02

## 2019-05-04 ENCOUNTER — HOME CARE/HOSPICE - HEALTHEAST (OUTPATIENT)
Dept: HOME HEALTH SERVICES | Facility: HOME HEALTH | Age: 84
End: 2019-05-04

## 2019-05-06 ENCOUNTER — HOME CARE/HOSPICE - HEALTHEAST (OUTPATIENT)
Dept: HOME HEALTH SERVICES | Facility: HOME HEALTH | Age: 84
End: 2019-05-06

## 2019-05-13 ENCOUNTER — HOME CARE/HOSPICE - HEALTHEAST (OUTPATIENT)
Dept: HOME HEALTH SERVICES | Facility: HOME HEALTH | Age: 84
End: 2019-05-13

## 2019-05-16 ENCOUNTER — HOME CARE/HOSPICE - HEALTHEAST (OUTPATIENT)
Dept: HOME HEALTH SERVICES | Facility: HOME HEALTH | Age: 84
End: 2019-05-16

## 2019-05-23 ENCOUNTER — HOME CARE/HOSPICE - HEALTHEAST (OUTPATIENT)
Dept: HOME HEALTH SERVICES | Facility: HOME HEALTH | Age: 84
End: 2019-05-23

## 2019-05-29 ENCOUNTER — HOME CARE/HOSPICE - HEALTHEAST (OUTPATIENT)
Dept: HOME HEALTH SERVICES | Facility: HOME HEALTH | Age: 84
End: 2019-05-29

## 2019-06-08 ENCOUNTER — HOME CARE/HOSPICE - HEALTHEAST (OUTPATIENT)
Dept: HOME HEALTH SERVICES | Facility: HOME HEALTH | Age: 84
End: 2019-06-08

## 2019-06-12 ENCOUNTER — HOME CARE/HOSPICE - HEALTHEAST (OUTPATIENT)
Dept: HOME HEALTH SERVICES | Facility: HOME HEALTH | Age: 84
End: 2019-06-12

## 2019-06-17 ENCOUNTER — HOME CARE/HOSPICE - HEALTHEAST (OUTPATIENT)
Dept: HOME HEALTH SERVICES | Facility: HOME HEALTH | Age: 84
End: 2019-06-17

## 2019-06-19 ENCOUNTER — HOME CARE/HOSPICE - HEALTHEAST (OUTPATIENT)
Dept: HOME HEALTH SERVICES | Facility: HOME HEALTH | Age: 84
End: 2019-06-19

## 2019-06-26 ENCOUNTER — HOME CARE/HOSPICE - HEALTHEAST (OUTPATIENT)
Dept: HOME HEALTH SERVICES | Facility: HOME HEALTH | Age: 84
End: 2019-06-26

## 2019-07-03 ENCOUNTER — HOME CARE/HOSPICE - HEALTHEAST (OUTPATIENT)
Dept: HOME HEALTH SERVICES | Facility: HOME HEALTH | Age: 84
End: 2019-07-03

## 2019-07-08 ENCOUNTER — HOME CARE/HOSPICE - HEALTHEAST (OUTPATIENT)
Dept: HOME HEALTH SERVICES | Facility: HOME HEALTH | Age: 84
End: 2019-07-08

## 2019-07-19 ENCOUNTER — HOME CARE/HOSPICE - HEALTHEAST (OUTPATIENT)
Dept: HOME HEALTH SERVICES | Facility: HOME HEALTH | Age: 84
End: 2019-07-19

## 2019-07-26 ENCOUNTER — HOME CARE/HOSPICE - HEALTHEAST (OUTPATIENT)
Dept: HOME HEALTH SERVICES | Facility: HOME HEALTH | Age: 84
End: 2019-07-26

## 2019-07-28 ENCOUNTER — HOME CARE/HOSPICE - HEALTHEAST (OUTPATIENT)
Dept: HOME HEALTH SERVICES | Facility: HOME HEALTH | Age: 84
End: 2019-07-28

## 2019-07-31 ENCOUNTER — HOME CARE/HOSPICE - HEALTHEAST (OUTPATIENT)
Dept: HOME HEALTH SERVICES | Facility: HOME HEALTH | Age: 84
End: 2019-07-31

## 2019-07-31 ENCOUNTER — RECORDS - HEALTHEAST (OUTPATIENT)
Dept: ADMINISTRATIVE | Facility: OTHER | Age: 84
End: 2019-07-31

## 2019-08-01 ENCOUNTER — HOME CARE/HOSPICE - HEALTHEAST (OUTPATIENT)
Dept: HOME HEALTH SERVICES | Facility: HOME HEALTH | Age: 84
End: 2019-08-01

## 2019-08-02 ENCOUNTER — RECORDS - HEALTHEAST (OUTPATIENT)
Dept: ADMINISTRATIVE | Facility: OTHER | Age: 84
End: 2019-08-02

## 2019-08-03 ENCOUNTER — HOME CARE/HOSPICE - HEALTHEAST (OUTPATIENT)
Dept: HOME HEALTH SERVICES | Facility: HOME HEALTH | Age: 84
End: 2019-08-03

## 2019-08-08 ENCOUNTER — HOME CARE/HOSPICE - HEALTHEAST (OUTPATIENT)
Dept: HOME HEALTH SERVICES | Facility: HOME HEALTH | Age: 84
End: 2019-08-08

## 2019-08-13 ENCOUNTER — HOME CARE/HOSPICE - HEALTHEAST (OUTPATIENT)
Dept: HOME HEALTH SERVICES | Facility: HOME HEALTH | Age: 84
End: 2019-08-13

## 2019-08-15 ENCOUNTER — HOME CARE/HOSPICE - HEALTHEAST (OUTPATIENT)
Dept: HOME HEALTH SERVICES | Facility: HOME HEALTH | Age: 84
End: 2019-08-15

## 2019-08-17 ENCOUNTER — HOME CARE/HOSPICE - HEALTHEAST (OUTPATIENT)
Dept: HOME HEALTH SERVICES | Facility: HOME HEALTH | Age: 84
End: 2019-08-17

## 2019-08-20 ENCOUNTER — HOME CARE/HOSPICE - HEALTHEAST (OUTPATIENT)
Dept: HOME HEALTH SERVICES | Facility: HOME HEALTH | Age: 84
End: 2019-08-20

## 2019-08-21 ENCOUNTER — HOME CARE/HOSPICE - HEALTHEAST (OUTPATIENT)
Dept: HOME HEALTH SERVICES | Facility: HOME HEALTH | Age: 84
End: 2019-08-21

## 2019-08-23 ENCOUNTER — HOME CARE/HOSPICE - HEALTHEAST (OUTPATIENT)
Dept: HOME HEALTH SERVICES | Facility: HOME HEALTH | Age: 84
End: 2019-08-23

## 2019-08-25 ENCOUNTER — HOME CARE/HOSPICE - HEALTHEAST (OUTPATIENT)
Dept: HOME HEALTH SERVICES | Facility: HOME HEALTH | Age: 84
End: 2019-08-25

## 2019-08-27 ENCOUNTER — HOME CARE/HOSPICE - HEALTHEAST (OUTPATIENT)
Dept: HOME HEALTH SERVICES | Facility: HOME HEALTH | Age: 84
End: 2019-08-27

## 2019-09-03 ENCOUNTER — HOME CARE/HOSPICE - HEALTHEAST (OUTPATIENT)
Dept: HOME HEALTH SERVICES | Facility: HOME HEALTH | Age: 84
End: 2019-09-03

## 2019-09-06 ENCOUNTER — HOME CARE/HOSPICE - HEALTHEAST (OUTPATIENT)
Dept: HOME HEALTH SERVICES | Facility: HOME HEALTH | Age: 84
End: 2019-09-06

## 2019-09-11 ENCOUNTER — HOME CARE/HOSPICE - HEALTHEAST (OUTPATIENT)
Dept: HOME HEALTH SERVICES | Facility: HOME HEALTH | Age: 84
End: 2019-09-11

## 2019-09-12 ENCOUNTER — HOME CARE/HOSPICE - HEALTHEAST (OUTPATIENT)
Dept: HOME HEALTH SERVICES | Facility: HOME HEALTH | Age: 84
End: 2019-09-12

## 2019-09-13 ENCOUNTER — HOME CARE/HOSPICE - HEALTHEAST (OUTPATIENT)
Dept: HOME HEALTH SERVICES | Facility: HOME HEALTH | Age: 84
End: 2019-09-13

## 2019-09-18 ENCOUNTER — HOME CARE/HOSPICE - HEALTHEAST (OUTPATIENT)
Dept: HOME HEALTH SERVICES | Facility: HOME HEALTH | Age: 84
End: 2019-09-18

## 2019-09-19 ENCOUNTER — HOME CARE/HOSPICE - HEALTHEAST (OUTPATIENT)
Dept: HOME HEALTH SERVICES | Facility: HOME HEALTH | Age: 84
End: 2019-09-19

## 2019-09-23 ENCOUNTER — HOME CARE/HOSPICE - HEALTHEAST (OUTPATIENT)
Dept: HOME HEALTH SERVICES | Facility: HOME HEALTH | Age: 84
End: 2019-09-23

## 2019-09-25 ENCOUNTER — HOME CARE/HOSPICE - HEALTHEAST (OUTPATIENT)
Dept: HOME HEALTH SERVICES | Facility: HOME HEALTH | Age: 84
End: 2019-09-25

## 2019-09-30 ENCOUNTER — HOME CARE/HOSPICE - HEALTHEAST (OUTPATIENT)
Dept: HOME HEALTH SERVICES | Facility: HOME HEALTH | Age: 84
End: 2019-09-30

## 2019-10-02 ENCOUNTER — HOME CARE/HOSPICE - HEALTHEAST (OUTPATIENT)
Dept: HOME HEALTH SERVICES | Facility: HOME HEALTH | Age: 84
End: 2019-10-02

## 2019-10-09 ENCOUNTER — HOME CARE/HOSPICE - HEALTHEAST (OUTPATIENT)
Dept: HOME HEALTH SERVICES | Facility: HOME HEALTH | Age: 84
End: 2019-10-09

## 2019-10-11 ENCOUNTER — HOME CARE/HOSPICE - HEALTHEAST (OUTPATIENT)
Dept: HOME HEALTH SERVICES | Facility: HOME HEALTH | Age: 84
End: 2019-10-11

## 2019-10-12 ENCOUNTER — RECORDS - HEALTHEAST (OUTPATIENT)
Dept: ADMINISTRATIVE | Facility: OTHER | Age: 84
End: 2019-10-12

## 2019-10-16 ENCOUNTER — HOME CARE/HOSPICE - HEALTHEAST (OUTPATIENT)
Dept: HOME HEALTH SERVICES | Facility: HOME HEALTH | Age: 84
End: 2019-10-16

## 2019-10-21 ENCOUNTER — HOME CARE/HOSPICE - HEALTHEAST (OUTPATIENT)
Dept: HOME HEALTH SERVICES | Facility: HOME HEALTH | Age: 84
End: 2019-10-21

## 2019-10-23 ENCOUNTER — RECORDS - HEALTHEAST (OUTPATIENT)
Dept: ADMINISTRATIVE | Facility: OTHER | Age: 84
End: 2019-10-23

## 2019-10-23 ENCOUNTER — HOME CARE/HOSPICE - HEALTHEAST (OUTPATIENT)
Dept: HOME HEALTH SERVICES | Facility: HOME HEALTH | Age: 84
End: 2019-10-23

## 2019-10-28 ENCOUNTER — RECORDS - HEALTHEAST (OUTPATIENT)
Dept: ADMINISTRATIVE | Facility: OTHER | Age: 84
End: 2019-10-28

## 2019-10-29 ENCOUNTER — HOME CARE/HOSPICE - HEALTHEAST (OUTPATIENT)
Dept: HOME HEALTH SERVICES | Facility: HOME HEALTH | Age: 84
End: 2019-10-29

## 2019-10-30 ENCOUNTER — RECORDS - HEALTHEAST (OUTPATIENT)
Dept: ADMINISTRATIVE | Facility: OTHER | Age: 84
End: 2019-10-30

## 2019-10-30 ENCOUNTER — HOME CARE/HOSPICE - HEALTHEAST (OUTPATIENT)
Dept: HOME HEALTH SERVICES | Facility: HOME HEALTH | Age: 84
End: 2019-10-30

## 2019-10-31 ENCOUNTER — HOME CARE/HOSPICE - HEALTHEAST (OUTPATIENT)
Dept: HOME HEALTH SERVICES | Facility: HOME HEALTH | Age: 84
End: 2019-10-31

## 2019-11-06 ENCOUNTER — HOME CARE/HOSPICE - HEALTHEAST (OUTPATIENT)
Dept: HOME HEALTH SERVICES | Facility: HOME HEALTH | Age: 84
End: 2019-11-06

## 2019-11-07 ENCOUNTER — RECORDS - HEALTHEAST (OUTPATIENT)
Dept: ADMINISTRATIVE | Facility: OTHER | Age: 84
End: 2019-11-07

## 2019-11-08 ENCOUNTER — HOME CARE/HOSPICE - HEALTHEAST (OUTPATIENT)
Dept: HOME HEALTH SERVICES | Facility: HOME HEALTH | Age: 84
End: 2019-11-08

## 2019-11-12 ENCOUNTER — HOME CARE/HOSPICE - HEALTHEAST (OUTPATIENT)
Dept: HOME HEALTH SERVICES | Facility: HOME HEALTH | Age: 84
End: 2019-11-12

## 2019-11-13 ENCOUNTER — HOME CARE/HOSPICE - HEALTHEAST (OUTPATIENT)
Dept: HOME HEALTH SERVICES | Facility: HOME HEALTH | Age: 84
End: 2019-11-13

## 2019-11-14 ENCOUNTER — RECORDS - HEALTHEAST (OUTPATIENT)
Dept: ADMINISTRATIVE | Facility: OTHER | Age: 84
End: 2019-11-14

## 2019-11-14 ENCOUNTER — HOME CARE/HOSPICE - HEALTHEAST (OUTPATIENT)
Dept: HOME HEALTH SERVICES | Facility: HOME HEALTH | Age: 84
End: 2019-11-14

## 2019-11-15 ENCOUNTER — HOME CARE/HOSPICE - HEALTHEAST (OUTPATIENT)
Dept: HOME HEALTH SERVICES | Facility: HOME HEALTH | Age: 84
End: 2019-11-15

## 2019-11-20 ENCOUNTER — HOME CARE/HOSPICE - HEALTHEAST (OUTPATIENT)
Dept: HOME HEALTH SERVICES | Facility: HOME HEALTH | Age: 84
End: 2019-11-20

## 2019-11-21 ENCOUNTER — HOME CARE/HOSPICE - HEALTHEAST (OUTPATIENT)
Dept: HOME HEALTH SERVICES | Facility: HOME HEALTH | Age: 84
End: 2019-11-21

## 2019-11-22 ENCOUNTER — HOME CARE/HOSPICE - HEALTHEAST (OUTPATIENT)
Dept: HOME HEALTH SERVICES | Facility: HOME HEALTH | Age: 84
End: 2019-11-22

## 2019-11-22 ENCOUNTER — RECORDS - HEALTHEAST (OUTPATIENT)
Dept: ADMINISTRATIVE | Facility: OTHER | Age: 84
End: 2019-11-22

## 2019-11-25 ENCOUNTER — RECORDS - HEALTHEAST (OUTPATIENT)
Dept: ADMINISTRATIVE | Facility: OTHER | Age: 84
End: 2019-11-25

## 2019-11-27 ENCOUNTER — RECORDS - HEALTHEAST (OUTPATIENT)
Dept: ADMINISTRATIVE | Facility: OTHER | Age: 84
End: 2019-11-27

## 2019-11-27 ENCOUNTER — HOME CARE/HOSPICE - HEALTHEAST (OUTPATIENT)
Dept: HOME HEALTH SERVICES | Facility: HOME HEALTH | Age: 84
End: 2019-11-27

## 2019-11-29 ENCOUNTER — HOME CARE/HOSPICE - HEALTHEAST (OUTPATIENT)
Dept: HOME HEALTH SERVICES | Facility: HOME HEALTH | Age: 84
End: 2019-11-29

## 2019-12-04 ENCOUNTER — HOME CARE/HOSPICE - HEALTHEAST (OUTPATIENT)
Dept: HOME HEALTH SERVICES | Facility: HOME HEALTH | Age: 84
End: 2019-12-04

## 2019-12-06 ENCOUNTER — HOME CARE/HOSPICE - HEALTHEAST (OUTPATIENT)
Dept: HOME HEALTH SERVICES | Facility: HOME HEALTH | Age: 84
End: 2019-12-06

## 2019-12-10 ENCOUNTER — HOME CARE/HOSPICE - HEALTHEAST (OUTPATIENT)
Dept: HOME HEALTH SERVICES | Facility: HOME HEALTH | Age: 84
End: 2019-12-10

## 2019-12-11 ENCOUNTER — HOME CARE/HOSPICE - HEALTHEAST (OUTPATIENT)
Dept: HOME HEALTH SERVICES | Facility: HOME HEALTH | Age: 84
End: 2019-12-11

## 2019-12-12 ENCOUNTER — HOME CARE/HOSPICE - HEALTHEAST (OUTPATIENT)
Dept: HOME HEALTH SERVICES | Facility: HOME HEALTH | Age: 84
End: 2019-12-12

## 2019-12-26 ENCOUNTER — HOME CARE/HOSPICE - HEALTHEAST (OUTPATIENT)
Dept: HOME HEALTH SERVICES | Facility: HOME HEALTH | Age: 84
End: 2019-12-26

## 2019-12-27 ENCOUNTER — HOME CARE/HOSPICE - HEALTHEAST (OUTPATIENT)
Dept: HOME HEALTH SERVICES | Facility: HOME HEALTH | Age: 84
End: 2019-12-27

## 2020-01-08 ENCOUNTER — HOME CARE/HOSPICE - HEALTHEAST (OUTPATIENT)
Dept: HOME HEALTH SERVICES | Facility: HOME HEALTH | Age: 85
End: 2020-01-08

## 2020-01-09 ENCOUNTER — HOME CARE/HOSPICE - HEALTHEAST (OUTPATIENT)
Dept: HOME HEALTH SERVICES | Facility: HOME HEALTH | Age: 85
End: 2020-01-09

## 2020-01-22 ENCOUNTER — HOME CARE/HOSPICE - HEALTHEAST (OUTPATIENT)
Dept: HOME HEALTH SERVICES | Facility: HOME HEALTH | Age: 85
End: 2020-01-22

## 2020-02-05 ENCOUNTER — HOME CARE/HOSPICE - HEALTHEAST (OUTPATIENT)
Dept: HOME HEALTH SERVICES | Facility: HOME HEALTH | Age: 85
End: 2020-02-05

## 2020-02-10 ENCOUNTER — HOME CARE/HOSPICE - HEALTHEAST (OUTPATIENT)
Dept: HOME HEALTH SERVICES | Facility: HOME HEALTH | Age: 85
End: 2020-02-10

## 2020-02-24 ENCOUNTER — HOME CARE/HOSPICE - HEALTHEAST (OUTPATIENT)
Dept: HOME HEALTH SERVICES | Facility: HOME HEALTH | Age: 85
End: 2020-02-24

## 2020-03-04 ENCOUNTER — HOME CARE/HOSPICE - HEALTHEAST (OUTPATIENT)
Dept: HOME HEALTH SERVICES | Facility: HOME HEALTH | Age: 85
End: 2020-03-04

## 2020-03-23 ENCOUNTER — HOME CARE/HOSPICE - HEALTHEAST (OUTPATIENT)
Dept: HOME HEALTH SERVICES | Facility: HOME HEALTH | Age: 85
End: 2020-03-23

## 2020-03-27 ENCOUNTER — HOME CARE/HOSPICE - HEALTHEAST (OUTPATIENT)
Dept: HOME HEALTH SERVICES | Facility: HOME HEALTH | Age: 85
End: 2020-03-27

## 2020-04-01 ENCOUNTER — HOME CARE/HOSPICE - HEALTHEAST (OUTPATIENT)
Dept: HOME HEALTH SERVICES | Facility: HOME HEALTH | Age: 85
End: 2020-04-01

## 2020-04-30 ENCOUNTER — HOME CARE/HOSPICE - HEALTHEAST (OUTPATIENT)
Dept: HOME HEALTH SERVICES | Facility: HOME HEALTH | Age: 85
End: 2020-04-30

## 2020-05-19 ENCOUNTER — HOME CARE/HOSPICE - HEALTHEAST (OUTPATIENT)
Dept: HOME HEALTH SERVICES | Facility: HOME HEALTH | Age: 85
End: 2020-05-19

## 2020-05-19 ENCOUNTER — RECORDS - HEALTHEAST (OUTPATIENT)
Dept: LAB | Facility: CLINIC | Age: 85
End: 2020-05-19

## 2020-05-19 LAB
ALBUMIN SERPL-MCNC: 4.1 G/DL (ref 3.5–5)
ALP SERPL-CCNC: 52 U/L (ref 45–120)
ALT SERPL W P-5'-P-CCNC: 24 U/L (ref 0–45)
ANION GAP SERPL CALCULATED.3IONS-SCNC: 11 MMOL/L (ref 5–18)
AST SERPL W P-5'-P-CCNC: 23 U/L (ref 0–40)
BILIRUB SERPL-MCNC: 0.6 MG/DL (ref 0–1)
BUN SERPL-MCNC: 21 MG/DL (ref 8–28)
CALCIUM SERPL-MCNC: 8.9 MG/DL (ref 8.5–10.5)
CHLORIDE BLD-SCNC: 97 MMOL/L (ref 98–107)
CO2 SERPL-SCNC: 25 MMOL/L (ref 22–31)
CREAT SERPL-MCNC: 0.83 MG/DL (ref 0.6–1.1)
GFR SERPL CREATININE-BSD FRML MDRD: >60 ML/MIN/1.73M2
GLUCOSE BLD-MCNC: 125 MG/DL (ref 70–125)
POTASSIUM BLD-SCNC: 4.2 MMOL/L (ref 3.5–5)
PROT SERPL-MCNC: 6.3 G/DL (ref 6–8)
SODIUM SERPL-SCNC: 133 MMOL/L (ref 136–145)

## 2020-06-02 ENCOUNTER — HOME CARE/HOSPICE - HEALTHEAST (OUTPATIENT)
Dept: HOME HEALTH SERVICES | Facility: HOME HEALTH | Age: 85
End: 2020-06-02

## 2020-06-04 ENCOUNTER — HOME CARE/HOSPICE - HEALTHEAST (OUTPATIENT)
Dept: HOME HEALTH SERVICES | Facility: HOME HEALTH | Age: 85
End: 2020-06-04

## 2020-06-12 ENCOUNTER — HOME CARE/HOSPICE - HEALTHEAST (OUTPATIENT)
Dept: HOME HEALTH SERVICES | Facility: HOME HEALTH | Age: 85
End: 2020-06-12

## 2020-07-02 ENCOUNTER — HOME CARE/HOSPICE - HEALTHEAST (OUTPATIENT)
Dept: HOME HEALTH SERVICES | Facility: HOME HEALTH | Age: 85
End: 2020-07-02

## 2020-07-22 ENCOUNTER — HOME CARE/HOSPICE - HEALTHEAST (OUTPATIENT)
Dept: HOME HEALTH SERVICES | Facility: HOME HEALTH | Age: 85
End: 2020-07-22

## 2020-07-29 ENCOUNTER — HOME CARE/HOSPICE - HEALTHEAST (OUTPATIENT)
Dept: HOME HEALTH SERVICES | Facility: HOME HEALTH | Age: 85
End: 2020-07-29

## 2020-07-30 ENCOUNTER — HOME CARE/HOSPICE - HEALTHEAST (OUTPATIENT)
Dept: HOME HEALTH SERVICES | Facility: HOME HEALTH | Age: 85
End: 2020-07-30

## 2020-08-04 ENCOUNTER — HOME CARE/HOSPICE - HEALTHEAST (OUTPATIENT)
Dept: HOME HEALTH SERVICES | Facility: HOME HEALTH | Age: 85
End: 2020-08-04

## 2020-08-26 ENCOUNTER — HOME CARE/HOSPICE - HEALTHEAST (OUTPATIENT)
Dept: HOME HEALTH SERVICES | Facility: HOME HEALTH | Age: 85
End: 2020-08-26

## 2020-09-17 ENCOUNTER — HOME CARE/HOSPICE - HEALTHEAST (OUTPATIENT)
Dept: HOME HEALTH SERVICES | Facility: HOME HEALTH | Age: 85
End: 2020-09-17

## 2020-09-18 ENCOUNTER — HOME CARE/HOSPICE - HEALTHEAST (OUTPATIENT)
Dept: HOME HEALTH SERVICES | Facility: HOME HEALTH | Age: 85
End: 2020-09-18

## 2020-09-24 ENCOUNTER — HOME CARE/HOSPICE - HEALTHEAST (OUTPATIENT)
Dept: HOME HEALTH SERVICES | Facility: HOME HEALTH | Age: 85
End: 2020-09-24

## 2020-09-29 ENCOUNTER — HOME CARE/HOSPICE - HEALTHEAST (OUTPATIENT)
Dept: HOME HEALTH SERVICES | Facility: HOME HEALTH | Age: 85
End: 2020-09-29

## 2020-10-08 ENCOUNTER — HOME CARE/HOSPICE - HEALTHEAST (OUTPATIENT)
Dept: HOME HEALTH SERVICES | Facility: HOME HEALTH | Age: 85
End: 2020-10-08

## 2020-10-15 ENCOUNTER — HOME CARE/HOSPICE - HEALTHEAST (OUTPATIENT)
Dept: HOME HEALTH SERVICES | Facility: HOME HEALTH | Age: 85
End: 2020-10-15

## 2020-10-22 ENCOUNTER — HOME CARE/HOSPICE - HEALTHEAST (OUTPATIENT)
Dept: HOME HEALTH SERVICES | Facility: HOME HEALTH | Age: 85
End: 2020-10-22

## 2020-10-24 ENCOUNTER — RECORDS - HEALTHEAST (OUTPATIENT)
Dept: ADMINISTRATIVE | Facility: OTHER | Age: 85
End: 2020-10-24

## 2020-10-25 ENCOUNTER — RECORDS - HEALTHEAST (OUTPATIENT)
Dept: ADMINISTRATIVE | Facility: OTHER | Age: 85
End: 2020-10-25

## 2020-10-26 ENCOUNTER — RECORDS - HEALTHEAST (OUTPATIENT)
Dept: ADMINISTRATIVE | Facility: OTHER | Age: 85
End: 2020-10-26

## 2020-10-27 ENCOUNTER — HOME CARE/HOSPICE - HEALTHEAST (OUTPATIENT)
Dept: HOME HEALTH SERVICES | Facility: HOME HEALTH | Age: 85
End: 2020-10-27

## 2020-10-28 ENCOUNTER — RECORDS - HEALTHEAST (OUTPATIENT)
Dept: ADMINISTRATIVE | Facility: OTHER | Age: 85
End: 2020-10-28

## 2020-11-04 ENCOUNTER — HOME CARE/HOSPICE - HEALTHEAST (OUTPATIENT)
Dept: HOME HEALTH SERVICES | Facility: HOME HEALTH | Age: 85
End: 2020-11-04

## 2020-11-11 PROBLEM — R26.2 AMBULATORY DYSFUNCTION: Status: ACTIVE | Noted: 2017-07-10

## 2020-11-11 PROBLEM — N30.00 ACUTE CYSTITIS WITHOUT HEMATURIA: Status: ACTIVE | Noted: 2020-10-28

## 2020-11-11 PROBLEM — R10.84 GENERALIZED ABDOMINAL PAIN: Status: ACTIVE | Noted: 2020-10-28
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
## 2020-11-11 PROBLEM — R41.0 CONFUSION: Status: ACTIVE | Noted: 2019-04-12

## 2020-11-11 PROBLEM — N32.81 OAB (OVERACTIVE BLADDER): Status: ACTIVE | Noted: 2018-06-26

## 2020-11-11 PROBLEM — D53.1 VITAMIN B12 DEFICIENT MEGALOBLASTIC ANEMIA: Status: ACTIVE | Noted: 2017-02-27

## 2020-11-11 PROBLEM — M48.061 NEUROFORAMINAL STENOSIS OF LUMBAR SPINE: Status: ACTIVE | Noted: 2017-07-31

## 2020-11-11 PROBLEM — R19.7 DIARRHEA: Status: ACTIVE | Noted: 2020-10-24

## 2020-11-11 PROBLEM — T83.89XA: Status: ACTIVE | Noted: 2018-04-12

## 2020-11-11 PROBLEM — I95.1 ORTHOSTATIC HYPOTENSION: Status: ACTIVE | Noted: 2020-11-11

## 2020-11-11 PROBLEM — M25.551 RIGHT HIP PAIN: Status: ACTIVE | Noted: 2020-11-11

## 2020-11-12 ENCOUNTER — HOME CARE/HOSPICE - HEALTHEAST (OUTPATIENT)
Dept: HOME HEALTH SERVICES | Facility: HOME HEALTH | Age: 85
End: 2020-11-12

## 2020-11-19 ENCOUNTER — HOME CARE/HOSPICE - HEALTHEAST (OUTPATIENT)
Dept: HOME HEALTH SERVICES | Facility: HOME HEALTH | Age: 85
End: 2020-11-19

## 2020-11-20 ENCOUNTER — HOME CARE/HOSPICE - HEALTHEAST (OUTPATIENT)
Dept: HOME HEALTH SERVICES | Facility: HOME HEALTH | Age: 85
End: 2020-11-20

## 2020-12-17 ENCOUNTER — HOME CARE/HOSPICE - HEALTHEAST (OUTPATIENT)
Dept: HOME HEALTH SERVICES | Facility: HOME HEALTH | Age: 85
End: 2020-12-17

## 2021-01-14 ENCOUNTER — HOME CARE/HOSPICE - HEALTHEAST (OUTPATIENT)
Dept: HOME HEALTH SERVICES | Facility: HOME HEALTH | Age: 86
End: 2021-01-14

## 2021-01-19 ENCOUNTER — HOME CARE/HOSPICE - HEALTHEAST (OUTPATIENT)
Dept: HOME HEALTH SERVICES | Facility: HOME HEALTH | Age: 86
End: 2021-01-19

## 2021-02-11 ENCOUNTER — HOME CARE/HOSPICE - HEALTHEAST (OUTPATIENT)
Dept: HOME HEALTH SERVICES | Facility: HOME HEALTH | Age: 86
End: 2021-02-11

## 2021-02-16 ENCOUNTER — HOME CARE/HOSPICE - HEALTHEAST (OUTPATIENT)
Dept: HOME HEALTH SERVICES | Facility: HOME HEALTH | Age: 86
End: 2021-02-16

## 2021-02-17 ENCOUNTER — HOME CARE/HOSPICE - HEALTHEAST (OUTPATIENT)
Dept: HOME HEALTH SERVICES | Facility: HOME HEALTH | Age: 86
End: 2021-02-17

## 2021-03-03 ENCOUNTER — COMMUNICATION - HEALTHEAST (OUTPATIENT)
Dept: SCHEDULING | Facility: CLINIC | Age: 86
End: 2021-03-03

## 2021-03-04 ENCOUNTER — HOME CARE/HOSPICE - HEALTHEAST (OUTPATIENT)
Dept: HOME HEALTH SERVICES | Facility: HOME HEALTH | Age: 86
End: 2021-03-04

## 2021-03-10 ENCOUNTER — COMMUNICATION - HEALTHEAST (OUTPATIENT)
Dept: SCHEDULING | Facility: CLINIC | Age: 86
End: 2021-03-10

## 2021-03-11 ENCOUNTER — RECORDS - HEALTHEAST (OUTPATIENT)
Dept: LAB | Facility: CLINIC | Age: 86
End: 2021-03-11

## 2021-03-12 ENCOUNTER — RECORDS - HEALTHEAST (OUTPATIENT)
Dept: LAB | Facility: CLINIC | Age: 86
End: 2021-03-12

## 2021-03-12 LAB
ANION GAP SERPL CALCULATED.3IONS-SCNC: 11 MMOL/L (ref 5–18)
BUN SERPL-MCNC: 13 MG/DL (ref 8–28)
CALCIUM SERPL-MCNC: 8.8 MG/DL (ref 8.5–10.5)
CHLORIDE BLD-SCNC: 93 MMOL/L (ref 98–107)
CO2 SERPL-SCNC: 26 MMOL/L (ref 22–31)
CREAT SERPL-MCNC: 0.76 MG/DL (ref 0.6–1.1)
GFR SERPL CREATININE-BSD FRML MDRD: >60 ML/MIN/1.73M2
GLUCOSE BLD-MCNC: 112 MG/DL (ref 70–125)
POTASSIUM BLD-SCNC: 3.4 MMOL/L (ref 3.5–5)
SODIUM SERPL-SCNC: 130 MMOL/L (ref 136–145)

## 2021-03-15 LAB
ANION GAP SERPL CALCULATED.3IONS-SCNC: 9 MMOL/L (ref 5–18)
BUN SERPL-MCNC: 9 MG/DL (ref 8–28)
CALCIUM SERPL-MCNC: 8.5 MG/DL (ref 8.5–10.5)
CHLORIDE BLD-SCNC: 98 MMOL/L (ref 98–107)
CO2 SERPL-SCNC: 27 MMOL/L (ref 22–31)
CREAT SERPL-MCNC: 0.66 MG/DL (ref 0.6–1.1)
ERYTHROCYTE [DISTWIDTH] IN BLOOD BY AUTOMATED COUNT: 13.4 % (ref 11–14.5)
GFR SERPL CREATININE-BSD FRML MDRD: >60 ML/MIN/1.73M2
GLUCOSE BLD-MCNC: 95 MG/DL (ref 70–125)
HCT VFR BLD AUTO: 37.4 % (ref 35–47)
HGB BLD-MCNC: 12.3 G/DL (ref 12–16)
MCH RBC QN AUTO: 29.3 PG (ref 27–34)
MCHC RBC AUTO-ENTMCNC: 32.9 G/DL (ref 32–36)
MCV RBC AUTO: 89 FL (ref 80–100)
PLATELET # BLD AUTO: 323 THOU/UL (ref 140–440)
PMV BLD AUTO: 9.1 FL (ref 8.5–12.5)
POTASSIUM BLD-SCNC: 3.5 MMOL/L (ref 3.5–5)
RBC # BLD AUTO: 4.2 MILL/UL (ref 3.8–5.4)
SODIUM SERPL-SCNC: 134 MMOL/L (ref 136–145)
WBC: 4.6 THOU/UL (ref 4–11)

## 2021-03-18 ENCOUNTER — RECORDS - HEALTHEAST (OUTPATIENT)
Dept: ADMINISTRATIVE | Facility: OTHER | Age: 86
End: 2021-03-18

## 2021-05-07 ENCOUNTER — COMMUNICATION - HEALTHEAST (OUTPATIENT)
Dept: SCHEDULING | Facility: CLINIC | Age: 86
End: 2021-05-07

## 2021-05-07 ENCOUNTER — AMBULATORY - HEALTHEAST (OUTPATIENT)
Dept: OTHER | Facility: CLINIC | Age: 86
End: 2021-05-07

## 2021-05-26 VITALS
DIASTOLIC BLOOD PRESSURE: 83 MMHG | SYSTOLIC BLOOD PRESSURE: 177 MMHG | HEART RATE: 60 BPM | OXYGEN SATURATION: 98 % | RESPIRATION RATE: 12 BRPM | TEMPERATURE: 98.5 F

## 2021-05-26 VITALS
TEMPERATURE: 98 F | DIASTOLIC BLOOD PRESSURE: 70 MMHG | OXYGEN SATURATION: 98 % | SYSTOLIC BLOOD PRESSURE: 141 MMHG | RESPIRATION RATE: 14 BRPM | HEART RATE: 70 BPM

## 2021-05-26 VITALS
OXYGEN SATURATION: 95 % | SYSTOLIC BLOOD PRESSURE: 138 MMHG | DIASTOLIC BLOOD PRESSURE: 73 MMHG | HEART RATE: 78 BPM | TEMPERATURE: 98.2 F

## 2021-05-26 VITALS
RESPIRATION RATE: 18 BRPM | SYSTOLIC BLOOD PRESSURE: 160 MMHG | TEMPERATURE: 98.8 F | DIASTOLIC BLOOD PRESSURE: 88 MMHG | HEART RATE: 70 BPM | OXYGEN SATURATION: 97 %

## 2021-05-26 VITALS
DIASTOLIC BLOOD PRESSURE: 64 MMHG | TEMPERATURE: 97.2 F | RESPIRATION RATE: 16 BRPM | OXYGEN SATURATION: 97 % | SYSTOLIC BLOOD PRESSURE: 146 MMHG | HEART RATE: 64 BPM

## 2021-05-26 VITALS
OXYGEN SATURATION: 99 % | DIASTOLIC BLOOD PRESSURE: 82 MMHG | HEART RATE: 70 BPM | RESPIRATION RATE: 16 BRPM | SYSTOLIC BLOOD PRESSURE: 158 MMHG | TEMPERATURE: 98.8 F

## 2021-05-26 VITALS
TEMPERATURE: 98.2 F | SYSTOLIC BLOOD PRESSURE: 186 MMHG | RESPIRATION RATE: 16 BRPM | OXYGEN SATURATION: 96 % | DIASTOLIC BLOOD PRESSURE: 104 MMHG | HEART RATE: 70 BPM

## 2021-05-26 VITALS
TEMPERATURE: 98.2 F | SYSTOLIC BLOOD PRESSURE: 160 MMHG | DIASTOLIC BLOOD PRESSURE: 90 MMHG | OXYGEN SATURATION: 96 % | RESPIRATION RATE: 18 BRPM | HEART RATE: 64 BPM

## 2021-05-26 VITALS
OXYGEN SATURATION: 97 % | TEMPERATURE: 98.3 F | HEART RATE: 62 BPM | DIASTOLIC BLOOD PRESSURE: 88 MMHG | SYSTOLIC BLOOD PRESSURE: 153 MMHG | RESPIRATION RATE: 18 BRPM

## 2021-05-26 VITALS
SYSTOLIC BLOOD PRESSURE: 127 MMHG | DIASTOLIC BLOOD PRESSURE: 74 MMHG | OXYGEN SATURATION: 97 % | RESPIRATION RATE: 12 BRPM | HEART RATE: 56 BPM | TEMPERATURE: 99 F

## 2021-05-26 VITALS
RESPIRATION RATE: 18 BRPM | TEMPERATURE: 98.6 F | SYSTOLIC BLOOD PRESSURE: 130 MMHG | DIASTOLIC BLOOD PRESSURE: 100 MMHG | OXYGEN SATURATION: 98 % | HEART RATE: 69 BPM

## 2021-05-26 VITALS
TEMPERATURE: 98.5 F | SYSTOLIC BLOOD PRESSURE: 159 MMHG | OXYGEN SATURATION: 97 % | DIASTOLIC BLOOD PRESSURE: 77 MMHG | HEART RATE: 60 BPM | RESPIRATION RATE: 12 BRPM

## 2021-05-26 VITALS
HEART RATE: 68 BPM | RESPIRATION RATE: 12 BRPM | OXYGEN SATURATION: 94 % | DIASTOLIC BLOOD PRESSURE: 60 MMHG | TEMPERATURE: 98.7 F | SYSTOLIC BLOOD PRESSURE: 134 MMHG

## 2021-05-26 VITALS
DIASTOLIC BLOOD PRESSURE: 76 MMHG | SYSTOLIC BLOOD PRESSURE: 150 MMHG | TEMPERATURE: 97.4 F | RESPIRATION RATE: 18 BRPM | OXYGEN SATURATION: 96 % | HEART RATE: 68 BPM

## 2021-05-26 VITALS
HEART RATE: 73 BPM | SYSTOLIC BLOOD PRESSURE: 138 MMHG | TEMPERATURE: 99.1 F | OXYGEN SATURATION: 98 % | DIASTOLIC BLOOD PRESSURE: 72 MMHG

## 2021-05-26 VITALS
HEART RATE: 64 BPM | DIASTOLIC BLOOD PRESSURE: 98 MMHG | TEMPERATURE: 98.2 F | OXYGEN SATURATION: 98 % | RESPIRATION RATE: 18 BRPM | SYSTOLIC BLOOD PRESSURE: 139 MMHG

## 2021-05-26 VITALS
SYSTOLIC BLOOD PRESSURE: 158 MMHG | DIASTOLIC BLOOD PRESSURE: 80 MMHG | RESPIRATION RATE: 16 BRPM | OXYGEN SATURATION: 96 % | TEMPERATURE: 98.3 F | HEART RATE: 60 BPM

## 2021-05-26 VITALS
HEART RATE: 58 BPM | SYSTOLIC BLOOD PRESSURE: 170 MMHG | RESPIRATION RATE: 12 BRPM | OXYGEN SATURATION: 98 % | DIASTOLIC BLOOD PRESSURE: 90 MMHG | TEMPERATURE: 98.5 F

## 2021-05-26 VITALS
SYSTOLIC BLOOD PRESSURE: 126 MMHG | TEMPERATURE: 99.4 F | OXYGEN SATURATION: 97 % | DIASTOLIC BLOOD PRESSURE: 80 MMHG | HEART RATE: 77 BPM

## 2021-05-26 VITALS — HEART RATE: 68 BPM | DIASTOLIC BLOOD PRESSURE: 97 MMHG | SYSTOLIC BLOOD PRESSURE: 167 MMHG

## 2021-05-26 VITALS
SYSTOLIC BLOOD PRESSURE: 140 MMHG | HEART RATE: 77 BPM | TEMPERATURE: 98.2 F | DIASTOLIC BLOOD PRESSURE: 82 MMHG | OXYGEN SATURATION: 97 %

## 2021-05-26 VITALS
TEMPERATURE: 98.8 F | OXYGEN SATURATION: 98 % | RESPIRATION RATE: 18 BRPM | SYSTOLIC BLOOD PRESSURE: 148 MMHG | HEART RATE: 70 BPM | DIASTOLIC BLOOD PRESSURE: 78 MMHG

## 2021-05-26 VITALS
TEMPERATURE: 98.4 F | DIASTOLIC BLOOD PRESSURE: 72 MMHG | OXYGEN SATURATION: 97 % | SYSTOLIC BLOOD PRESSURE: 122 MMHG | HEART RATE: 62 BPM

## 2021-05-26 VITALS
SYSTOLIC BLOOD PRESSURE: 150 MMHG | HEART RATE: 68 BPM | TEMPERATURE: 98.7 F | DIASTOLIC BLOOD PRESSURE: 94 MMHG | OXYGEN SATURATION: 97 %

## 2021-05-26 VITALS — HEART RATE: 98 BPM

## 2021-05-27 VITALS
OXYGEN SATURATION: 95 % | TEMPERATURE: 97.8 F | SYSTOLIC BLOOD PRESSURE: 138 MMHG | DIASTOLIC BLOOD PRESSURE: 84 MMHG | HEART RATE: 79 BPM

## 2021-05-27 VITALS
DIASTOLIC BLOOD PRESSURE: 71 MMHG | OXYGEN SATURATION: 97 % | HEART RATE: 55 BPM | TEMPERATURE: 98.1 F | RESPIRATION RATE: 16 BRPM | SYSTOLIC BLOOD PRESSURE: 136 MMHG

## 2021-05-27 VITALS
DIASTOLIC BLOOD PRESSURE: 66 MMHG | SYSTOLIC BLOOD PRESSURE: 92 MMHG | OXYGEN SATURATION: 96 % | TEMPERATURE: 98.1 F | HEART RATE: 66 BPM

## 2021-05-27 VITALS
TEMPERATURE: 98.3 F | RESPIRATION RATE: 12 BRPM | SYSTOLIC BLOOD PRESSURE: 158 MMHG | OXYGEN SATURATION: 97 % | HEART RATE: 57 BPM | DIASTOLIC BLOOD PRESSURE: 68 MMHG

## 2021-05-27 VITALS
SYSTOLIC BLOOD PRESSURE: 140 MMHG | DIASTOLIC BLOOD PRESSURE: 68 MMHG | TEMPERATURE: 96.9 F | HEART RATE: 62 BPM | OXYGEN SATURATION: 98 % | RESPIRATION RATE: 16 BRPM

## 2021-05-27 VITALS
RESPIRATION RATE: 16 BRPM | HEART RATE: 60 BPM | DIASTOLIC BLOOD PRESSURE: 82 MMHG | TEMPERATURE: 98.1 F | SYSTOLIC BLOOD PRESSURE: 160 MMHG | OXYGEN SATURATION: 97 %

## 2021-05-27 VITALS
DIASTOLIC BLOOD PRESSURE: 100 MMHG | RESPIRATION RATE: 16 BRPM | TEMPERATURE: 98.1 F | OXYGEN SATURATION: 96 % | HEART RATE: 70 BPM | SYSTOLIC BLOOD PRESSURE: 190 MMHG

## 2021-05-27 VITALS
DIASTOLIC BLOOD PRESSURE: 70 MMHG | SYSTOLIC BLOOD PRESSURE: 130 MMHG | OXYGEN SATURATION: 98 % | TEMPERATURE: 98.7 F | RESPIRATION RATE: 12 BRPM | SYSTOLIC BLOOD PRESSURE: 146 MMHG | OXYGEN SATURATION: 97 % | DIASTOLIC BLOOD PRESSURE: 70 MMHG | HEART RATE: 63 BPM | TEMPERATURE: 98 F | HEART RATE: 60 BPM

## 2021-05-27 VITALS
SYSTOLIC BLOOD PRESSURE: 132 MMHG | RESPIRATION RATE: 12 BRPM | DIASTOLIC BLOOD PRESSURE: 60 MMHG | HEART RATE: 64 BPM | TEMPERATURE: 96 F | OXYGEN SATURATION: 98 %

## 2021-05-27 VITALS
TEMPERATURE: 98.1 F | DIASTOLIC BLOOD PRESSURE: 88 MMHG | OXYGEN SATURATION: 98 % | SYSTOLIC BLOOD PRESSURE: 162 MMHG | HEART RATE: 60 BPM

## 2021-05-27 VITALS
RESPIRATION RATE: 18 BRPM | TEMPERATURE: 98.2 F | DIASTOLIC BLOOD PRESSURE: 82 MMHG | SYSTOLIC BLOOD PRESSURE: 142 MMHG | OXYGEN SATURATION: 97 % | HEART RATE: 62 BPM

## 2021-05-27 VITALS
SYSTOLIC BLOOD PRESSURE: 166 MMHG | RESPIRATION RATE: 16 BRPM | DIASTOLIC BLOOD PRESSURE: 92 MMHG | TEMPERATURE: 97 F | OXYGEN SATURATION: 97 % | HEART RATE: 68 BPM

## 2021-05-27 VITALS
DIASTOLIC BLOOD PRESSURE: 63 MMHG | RESPIRATION RATE: 12 BRPM | TEMPERATURE: 98.6 F | OXYGEN SATURATION: 98 % | HEART RATE: 56 BPM | SYSTOLIC BLOOD PRESSURE: 160 MMHG

## 2021-05-27 VITALS
TEMPERATURE: 98.8 F | SYSTOLIC BLOOD PRESSURE: 180 MMHG | HEART RATE: 68 BPM | OXYGEN SATURATION: 97 % | DIASTOLIC BLOOD PRESSURE: 90 MMHG

## 2021-05-27 VITALS
OXYGEN SATURATION: 98 % | TEMPERATURE: 98.4 F | HEART RATE: 78 BPM | DIASTOLIC BLOOD PRESSURE: 76 MMHG | RESPIRATION RATE: 19 BRPM | SYSTOLIC BLOOD PRESSURE: 128 MMHG

## 2021-05-27 VITALS
SYSTOLIC BLOOD PRESSURE: 158 MMHG | RESPIRATION RATE: 18 BRPM | OXYGEN SATURATION: 97 % | TEMPERATURE: 98.1 F | DIASTOLIC BLOOD PRESSURE: 100 MMHG | HEART RATE: 57 BPM

## 2021-05-27 VITALS
TEMPERATURE: 96.6 F | OXYGEN SATURATION: 96 % | SYSTOLIC BLOOD PRESSURE: 145 MMHG | RESPIRATION RATE: 12 BRPM | DIASTOLIC BLOOD PRESSURE: 70 MMHG | HEART RATE: 65 BPM

## 2021-05-27 VITALS
TEMPERATURE: 98.7 F | DIASTOLIC BLOOD PRESSURE: 70 MMHG | OXYGEN SATURATION: 96 % | HEART RATE: 65 BPM | SYSTOLIC BLOOD PRESSURE: 150 MMHG | RESPIRATION RATE: 12 BRPM

## 2021-05-27 VITALS
OXYGEN SATURATION: 98 % | SYSTOLIC BLOOD PRESSURE: 144 MMHG | TEMPERATURE: 96 F | DIASTOLIC BLOOD PRESSURE: 74 MMHG | HEART RATE: 76 BPM | RESPIRATION RATE: 12 BRPM

## 2021-05-28 NOTE — TELEPHONE ENCOUNTER
Triage call: No PCP with [facility]      OT attempting to reach on-call RN for home care. Initially thought she was calling on call number for [facility] care. Advised that she had called RN triage for Primary Care Clinics and patient does not have a PCP with [facility]. OT will again try to get hold of on-call RN for Home Care.     Meredith Newman RN BA [facility] Triage/Med Refill 4/26/2019 4:45 PM

## 2021-05-30 VITALS — BODY MASS INDEX: 31.89 KG/M2 | WEIGHT: 180 LBS

## 2021-05-30 VITALS — BODY MASS INDEX: 31.01 KG/M2 | WEIGHT: 175 LBS | HEIGHT: 63 IN

## 2021-05-30 VITALS — WEIGHT: 184 LBS | BODY MASS INDEX: 32.59 KG/M2

## 2021-05-30 VITALS — WEIGHT: 170 LBS | BODY MASS INDEX: 30.11 KG/M2

## 2021-05-30 VITALS — BODY MASS INDEX: 29.23 KG/M2 | WEIGHT: 165 LBS

## 2021-05-30 VITALS — WEIGHT: 178 LBS | BODY MASS INDEX: 31.53 KG/M2

## 2021-05-31 VITALS — BODY MASS INDEX: 30.11 KG/M2 | WEIGHT: 170 LBS

## 2021-05-31 VITALS — WEIGHT: 168 LBS | BODY MASS INDEX: 29.76 KG/M2

## 2021-05-31 VITALS — BODY MASS INDEX: 28.7 KG/M2 | WEIGHT: 162 LBS

## 2021-05-31 VITALS — WEIGHT: 162 LBS | BODY MASS INDEX: 28.7 KG/M2

## 2021-05-31 VITALS — BODY MASS INDEX: 28.34 KG/M2 | WEIGHT: 160 LBS

## 2021-06-01 VITALS — BODY MASS INDEX: 31 KG/M2 | WEIGHT: 175 LBS

## 2021-06-01 VITALS — WEIGHT: 170 LBS | BODY MASS INDEX: 30.11 KG/M2

## 2021-06-01 VITALS — BODY MASS INDEX: 31.18 KG/M2 | WEIGHT: 176 LBS

## 2021-06-01 VITALS — BODY MASS INDEX: 28.87 KG/M2 | WEIGHT: 163 LBS

## 2021-06-07 NOTE — PROGRESS NOTES
HIS: please fax this message to PCP- Dr Patel    Up date on HTN status  BP readings by home machine:  4/30 123/80, 63 before meds and was c/o of dizziness, 146/70 druing  home care nurse  visit   4/29 142/85, 58  4/28 132/81, 64 2 hrs after AM meds  4/27 142/88, 117/74  4/4 132/77, 62  4/23 116/71, 63  4/22 116/74, 60  4/21 145/90, 65  4/20 123/79, 63  4/7 127/78, 62  4/16 133/81, 65  Per epic note and caregiver on 4/22/20: Amlodipine   2.5mg stopped due to edema in LE. Hydralazine 1/2 tab of 25mg BID started. She was also on Lasix 20mg x 3 days.  Edema got better but still having pitting edema in feet. She reported she ate low salt diet and elevated her legs. She instructed to do foot ankle exercises and cont. elevating her legs as high as she can. She can not tolerated compression stocking.    She is asking to cont. on Lasix for few more days. I asked pharmacy to fax   refill.   Please advise.

## 2021-06-08 NOTE — PROGRESS NOTES
BP readings by home machine  4/27 142/88 HR 61; 117/74 HR 58  4/28 132/81 HR 64  4/29 142/85 HR 58  4/30 123/80 HR 63; 146/70  5/4 135/81 HR 52  5/5 136/88 HR 58  5/6 137/86 HR 64  5/7 150/90 HR 58  5/10 132/84 HR 58  5/12 123/83 HR 63  5/13 147/92 HR 54  5/14 131/80 HR 55  5/18 164/103 HR 58  5/19 124/79 HR 59  Reported some dizziness occasionally  Please send these reading to Attending MD

## 2021-06-11 NOTE — PROGRESS NOTES
I completed recert assessment for patient. She is going to get her hair done on Wednesdays so requested visits on Thursdays. She has started medical marijuana and has 4 different applications with titrating doses available. Patient appears to be responding well but does not have mental capacity to decide optimum dosing and CG is not comfortable guiding this. Increase visits to weekly x4 weeks to assess pain levels and suggest usage. Ple  ase note that I was not able to find this to add to med list so I placed in the medication comments.   Thanks  Carin Rg RN

## 2021-06-12 NOTE — PROGRESS NOTES
B 12 not due until next week. set up 3.5 weeks of medication. Pt has 1.5 weeks already setup. some meds short and too soon to fill. care giver stated she will take care of them. verbal instructions with marked box.

## 2021-06-15 NOTE — PROGRESS NOTES
OCCURRENCE: Patient admitted to hospital > 24 hours    WHAT HAPPENED: Pt admitted on 3/2 due to fatigue, possible UTI    DOES PCP NEED TO BE UPDATED? Yes: CM to follow up    WHAT ACTIONS NEED TO BE TAKEN (BY WHO): Nursing CM: OASIS transfer without discharge needed    NOTE SENT TO: CM    This patient has been admitted for over 24 hours and requires a Transfer OASIS.   To find the Transfer OASIS checklists, login to the [employer] [facility].  Using the magnifying glass to search.  Type  Transfer  into search field and then click on Files just under the Search box to the right.  There is a checklist for both a Transfer OASIS with DC and a Transfer OASIS without DC.      --the date you complete the OASIS--this should be within 2 days of .   --the date of transfer--will be: 3/2/21   --influenza vaccine data collection period--will be: yes      Please send notification of hospitalization to MD per checklist direction.      Verify that visit sets have been ended.

## 2021-06-21 NOTE — LETTER
Letter by Latrice Rubio RN at      Author: Latrice Rubio RN Service: -- Author Type: --    Filed:  Encounter Date: 5/7/2021 Status: (Other)       [facility]  [address]   [place] [address]        Ani Porter  [facility] [address]  [facility] [address]      5/7/2021    We were recently notified that you requested removal of Alivia from your emergency contact list. This person is named as a health care agent in your health care directive dated 02/17/2012. Because this is a legal document, we cannot remove this person as a contact.    In order to remove someone who is named as a health care agent you will need to either provide us with a more recent health care directive or create a new one.      We greatly value the opportunity to assist you in documenting your choices and to honor your wishes. We have several options to assist you in updating your document:       Health Care Directives and Advance Care Planning resources can be viewed and printed  for free at our web site:[email or web address].       Free group classes on Advance Care Planning and completing a Health Care Directive are available at multiple locations and times. These classes are led by trained staff who will provide information and guide you through a Health Care Directive. They can also review, notarize and add your Health Care Directive to your medical record. [place] for a class at [email or web address] or by calling [phone].      COPIES of completed Health Care Directives can be brought or mailed to any of our locations, including the address listed below. You can also email a copy to [email or web address] .       For additional assistance or questions you can email us at [email or web address] or call [phone].      Sincerely,     [facility]  [address]   [place]  [address]  [email or web address]  [phone]

## 2021-08-23 ENCOUNTER — APPOINTMENT (OUTPATIENT)
Dept: RADIOLOGY | Facility: HOSPITAL | Age: 86
End: 2021-08-23
Attending: EMERGENCY MEDICINE
Payer: MEDICARE

## 2021-08-23 ENCOUNTER — APPOINTMENT (OUTPATIENT)
Dept: CT IMAGING | Facility: HOSPITAL | Age: 86
End: 2021-08-23
Attending: EMERGENCY MEDICINE
Payer: MEDICARE

## 2021-08-23 ENCOUNTER — HOSPITAL ENCOUNTER (OUTPATIENT)
Facility: HOSPITAL | Age: 86
Setting detail: OBSERVATION
Discharge: HOME | End: 2021-08-26
Attending: EMERGENCY MEDICINE | Admitting: EMERGENCY MEDICINE
Payer: MEDICARE

## 2021-08-23 DIAGNOSIS — Z74.09 IMPAIRED MOBILITY AND ADLS: ICD-10-CM

## 2021-08-23 DIAGNOSIS — N39.0 URINARY TRACT INFECTION WITH HEMATURIA, SITE UNSPECIFIED: Primary | ICD-10-CM

## 2021-08-23 DIAGNOSIS — Z78.9 IMPAIRED MOBILITY AND ADLS: ICD-10-CM

## 2021-08-23 DIAGNOSIS — R31.9 URINARY TRACT INFECTION WITH HEMATURIA, SITE UNSPECIFIED: Primary | ICD-10-CM

## 2021-08-23 DIAGNOSIS — N30.00 ACUTE CYSTITIS WITHOUT HEMATURIA: ICD-10-CM

## 2021-08-23 DIAGNOSIS — E83.42 HYPOMAGNESEMIA: ICD-10-CM

## 2021-08-23 DIAGNOSIS — R41.0 DELIRIUM: ICD-10-CM

## 2021-08-23 DIAGNOSIS — M62.81 GENERALIZED MUSCLE WEAKNESS: ICD-10-CM

## 2021-08-23 PROBLEM — I50.32 CHRONIC DIASTOLIC HEART FAILURE (H): Status: ACTIVE | Noted: 2020-10-28

## 2021-08-23 LAB
ALBUMIN SERPL-MCNC: 3.8 G/DL (ref 3.5–5)
ALBUMIN UR-MCNC: 200 MG/DL
ALP SERPL-CCNC: 60 U/L (ref 45–120)
ALT SERPL W P-5'-P-CCNC: 10 U/L (ref 0–45)
ANION GAP SERPL CALCULATED.3IONS-SCNC: 8 MMOL/L (ref 5–18)
APPEARANCE UR: ABNORMAL
AST SERPL W P-5'-P-CCNC: 14 U/L (ref 0–40)
BASOPHILS # BLD AUTO: 0 10E3/UL (ref 0–0.2)
BASOPHILS NFR BLD AUTO: 0 %
BILIRUB SERPL-MCNC: 0.9 MG/DL (ref 0–1)
BILIRUB UR QL STRIP: NEGATIVE
BUN SERPL-MCNC: 18 MG/DL (ref 8–28)
CALCIUM SERPL-MCNC: 9.4 MG/DL (ref 8.5–10.5)
CHLORIDE BLD-SCNC: 99 MMOL/L (ref 98–107)
CO2 SERPL-SCNC: 28 MMOL/L (ref 22–31)
COLOR UR AUTO: YELLOW
CREAT SERPL-MCNC: 0.92 MG/DL (ref 0.6–1.1)
EOSINOPHIL # BLD AUTO: 0.1 10E3/UL (ref 0–0.7)
EOSINOPHIL NFR BLD AUTO: 1 %
ERYTHROCYTE [DISTWIDTH] IN BLOOD BY AUTOMATED COUNT: 12.7 % (ref 10–15)
FOLATE SERPL-MCNC: 16.3 NG/ML
GFR SERPL CREATININE-BSD FRML MDRD: 54 ML/MIN/1.73M2
GLUCOSE BLD-MCNC: 139 MG/DL (ref 70–125)
GLUCOSE UR STRIP-MCNC: NEGATIVE MG/DL
HCT VFR BLD AUTO: 30.2 % (ref 35–47)
HGB BLD-MCNC: 10 G/DL (ref 11.7–15.7)
HGB UR QL STRIP: ABNORMAL
HOLD SPECIMEN: NORMAL
IMM GRANULOCYTES # BLD: 0.1 10E3/UL
IMM GRANULOCYTES NFR BLD: 0 %
KETONES UR STRIP-MCNC: NEGATIVE MG/DL
LEUKOCYTE ESTERASE UR QL STRIP: ABNORMAL
LYMPHOCYTES # BLD AUTO: 1.4 10E3/UL (ref 0.8–5.3)
LYMPHOCYTES NFR BLD AUTO: 12 %
MAGNESIUM SERPL-MCNC: 1.6 MG/DL (ref 1.8–2.6)
MCH RBC QN AUTO: 31.2 PG (ref 26.5–33)
MCHC RBC AUTO-ENTMCNC: 33.1 G/DL (ref 31.5–36.5)
MCV RBC AUTO: 94 FL (ref 78–100)
MONOCYTES # BLD AUTO: 1.2 10E3/UL (ref 0–1.3)
MONOCYTES NFR BLD AUTO: 10 %
NEUTROPHILS # BLD AUTO: 8.7 10E3/UL (ref 1.6–8.3)
NEUTROPHILS NFR BLD AUTO: 77 %
NITRATE UR QL: NEGATIVE
NRBC # BLD AUTO: 0 10E3/UL
NRBC BLD AUTO-RTO: 0 /100
PH UR STRIP: 6 [PH] (ref 5–7)
PLATELET # BLD AUTO: 276 10E3/UL (ref 150–450)
POTASSIUM BLD-SCNC: 3.8 MMOL/L (ref 3.5–5)
PROT SERPL-MCNC: 6.6 G/DL (ref 6–8)
RBC # BLD AUTO: 3.21 10E6/UL (ref 3.8–5.2)
RBC URINE: 104 /HPF
SARS-COV-2 RNA RESP QL NAA+PROBE: NEGATIVE
SODIUM SERPL-SCNC: 135 MMOL/L (ref 136–145)
SP GR UR STRIP: 1.02 (ref 1–1.03)
SQUAMOUS EPITHELIAL: 1 /HPF
TRANSITIONAL EPI: <1 /HPF
TROPONIN I SERPL-MCNC: 0.08 NG/ML (ref 0–0.29)
TSH SERPL DL<=0.005 MIU/L-ACNC: 1.32 UIU/ML (ref 0.3–5)
UROBILINOGEN UR STRIP-MCNC: <2 MG/DL
VIT B12 SERPL-MCNC: 575 PG/ML (ref 213–816)
WBC # BLD AUTO: 11.4 10E3/UL (ref 4–11)
WBC URINE: 97 /HPF

## 2021-08-23 PROCEDURE — G0378 HOSPITAL OBSERVATION PER HR: HCPCS

## 2021-08-23 PROCEDURE — 85025 COMPLETE CBC W/AUTO DIFF WBC: CPT | Performed by: EMERGENCY MEDICINE

## 2021-08-23 PROCEDURE — 36415 COLL VENOUS BLD VENIPUNCTURE: CPT | Performed by: EMERGENCY MEDICINE

## 2021-08-23 PROCEDURE — 82607 VITAMIN B-12: CPT | Performed by: INTERNAL MEDICINE

## 2021-08-23 PROCEDURE — 99220 PR INITIAL OBSERVATION CARE,LEVEL III: CPT | Performed by: INTERNAL MEDICINE

## 2021-08-23 PROCEDURE — 70450 CT HEAD/BRAIN W/O DYE: CPT

## 2021-08-23 PROCEDURE — 96360 HYDRATION IV INFUSION INIT: CPT

## 2021-08-23 PROCEDURE — 250N000013 HC RX MED GY IP 250 OP 250 PS 637: Performed by: INTERNAL MEDICINE

## 2021-08-23 PROCEDURE — 99285 EMERGENCY DEPT VISIT HI MDM: CPT | Mod: 25

## 2021-08-23 PROCEDURE — 87635 SARS-COV-2 COVID-19 AMP PRB: CPT | Performed by: EMERGENCY MEDICINE

## 2021-08-23 PROCEDURE — 96361 HYDRATE IV INFUSION ADD-ON: CPT

## 2021-08-23 PROCEDURE — 93005 ELECTROCARDIOGRAM TRACING: CPT | Performed by: EMERGENCY MEDICINE

## 2021-08-23 PROCEDURE — 250N000011 HC RX IP 250 OP 636: Performed by: INTERNAL MEDICINE

## 2021-08-23 PROCEDURE — 82746 ASSAY OF FOLIC ACID SERUM: CPT | Performed by: INTERNAL MEDICINE

## 2021-08-23 PROCEDURE — 80053 COMPREHEN METABOLIC PANEL: CPT | Performed by: EMERGENCY MEDICINE

## 2021-08-23 PROCEDURE — 84484 ASSAY OF TROPONIN QUANT: CPT | Performed by: EMERGENCY MEDICINE

## 2021-08-23 PROCEDURE — 81003 URINALYSIS AUTO W/O SCOPE: CPT | Performed by: EMERGENCY MEDICINE

## 2021-08-23 PROCEDURE — 71045 X-RAY EXAM CHEST 1 VIEW: CPT

## 2021-08-23 PROCEDURE — 84443 ASSAY THYROID STIM HORMONE: CPT | Performed by: INTERNAL MEDICINE

## 2021-08-23 PROCEDURE — C9803 HOPD COVID-19 SPEC COLLECT: HCPCS

## 2021-08-23 PROCEDURE — 87086 URINE CULTURE/COLONY COUNT: CPT | Performed by: EMERGENCY MEDICINE

## 2021-08-23 PROCEDURE — 83735 ASSAY OF MAGNESIUM: CPT | Performed by: EMERGENCY MEDICINE

## 2021-08-23 PROCEDURE — 258N000003 HC RX IP 258 OP 636: Performed by: EMERGENCY MEDICINE

## 2021-08-23 RX ORDER — VITAMIN B COMPLEX
2000 TABLET ORAL DAILY
Status: DISCONTINUED | OUTPATIENT
Start: 2021-08-24 | End: 2021-08-26 | Stop reason: HOSPADM

## 2021-08-23 RX ORDER — IBUPROFEN 200 MG
400 TABLET ORAL
Status: ON HOLD | COMMUNITY
Start: 2021-03-11 | End: 2021-11-26

## 2021-08-23 RX ORDER — IBUPROFEN 200 MG
400 TABLET ORAL AT BEDTIME
COMMUNITY
End: 2021-11-22

## 2021-08-23 RX ORDER — HYDROXYZINE HYDROCHLORIDE 25 MG/1
25 TABLET, FILM COATED ORAL AT BEDTIME
Status: ON HOLD | COMMUNITY
Start: 2021-03-11 | End: 2021-11-26

## 2021-08-23 RX ORDER — ACETAMINOPHEN 650 MG/1
650 SUPPOSITORY RECTAL EVERY 6 HOURS PRN
Status: DISCONTINUED | OUTPATIENT
Start: 2021-08-23 | End: 2021-08-23

## 2021-08-23 RX ORDER — ONDANSETRON 2 MG/ML
4 INJECTION INTRAMUSCULAR; INTRAVENOUS EVERY 6 HOURS PRN
Status: DISCONTINUED | OUTPATIENT
Start: 2021-08-23 | End: 2021-08-26 | Stop reason: HOSPADM

## 2021-08-23 RX ORDER — LANOLIN ALCOHOL/MO/W.PET/CERES
3 CREAM (GRAM) TOPICAL
Status: DISCONTINUED | OUTPATIENT
Start: 2021-08-23 | End: 2021-08-26 | Stop reason: HOSPADM

## 2021-08-23 RX ORDER — LOSARTAN POTASSIUM 100 MG/1
100 TABLET ORAL
COMMUNITY

## 2021-08-23 RX ORDER — FAMOTIDINE 20 MG/1
20 TABLET, FILM COATED ORAL 2 TIMES DAILY
COMMUNITY
Start: 2021-04-14

## 2021-08-23 RX ORDER — LEVOFLOXACIN 5 MG/ML
250 INJECTION, SOLUTION INTRAVENOUS EVERY 24 HOURS
Status: DISCONTINUED | OUTPATIENT
Start: 2021-08-23 | End: 2021-08-25

## 2021-08-23 RX ORDER — FAMOTIDINE 20 MG/1
20 TABLET, FILM COATED ORAL DAILY
Status: DISCONTINUED | OUTPATIENT
Start: 2021-08-24 | End: 2021-08-26 | Stop reason: HOSPADM

## 2021-08-23 RX ORDER — LOSARTAN POTASSIUM 25 MG/1
100 TABLET ORAL
Status: DISCONTINUED | OUTPATIENT
Start: 2021-08-24 | End: 2021-08-26 | Stop reason: HOSPADM

## 2021-08-23 RX ORDER — LOPERAMIDE HCL 2 MG
2 CAPSULE ORAL 4 TIMES DAILY PRN
COMMUNITY
Start: 2021-04-29

## 2021-08-23 RX ORDER — VIT A/VIT C/VIT E/ZINC/COPPER 4296-226
1 CAPSULE ORAL 2 TIMES DAILY WITH MEALS
COMMUNITY

## 2021-08-23 RX ORDER — ONDANSETRON 4 MG/1
4 TABLET, ORALLY DISINTEGRATING ORAL EVERY 6 HOURS PRN
Status: DISCONTINUED | OUTPATIENT
Start: 2021-08-23 | End: 2021-08-26 | Stop reason: HOSPADM

## 2021-08-23 RX ORDER — HYDRALAZINE HYDROCHLORIDE 25 MG/1
12.5 TABLET, FILM COATED ORAL 2 TIMES DAILY
Status: ON HOLD | COMMUNITY
Start: 2020-04-22 | End: 2021-11-26

## 2021-08-23 RX ORDER — ACETAMINOPHEN 325 MG/1
650 TABLET ORAL EVERY 6 HOURS PRN
Status: DISCONTINUED | OUTPATIENT
Start: 2021-08-23 | End: 2021-08-23

## 2021-08-23 RX ORDER — HYDRALAZINE HYDROCHLORIDE 20 MG/ML
10 INJECTION INTRAMUSCULAR; INTRAVENOUS EVERY 4 HOURS PRN
Status: DISCONTINUED | OUTPATIENT
Start: 2021-08-23 | End: 2021-08-26 | Stop reason: HOSPADM

## 2021-08-23 RX ORDER — DULOXETIN HYDROCHLORIDE 60 MG/1
60 CAPSULE, DELAYED RELEASE ORAL AT BEDTIME
Status: DISCONTINUED | OUTPATIENT
Start: 2021-08-23 | End: 2021-08-26 | Stop reason: HOSPADM

## 2021-08-23 RX ORDER — CALCIUM CARBONATE 500 MG/1
1000 TABLET, CHEWABLE ORAL 4 TIMES DAILY PRN
Status: DISCONTINUED | OUTPATIENT
Start: 2021-08-23 | End: 2021-08-26 | Stop reason: HOSPADM

## 2021-08-23 RX ORDER — ACETAMINOPHEN 325 MG/1
975 TABLET ORAL 3 TIMES DAILY
Status: DISCONTINUED | OUTPATIENT
Start: 2021-08-24 | End: 2021-08-26 | Stop reason: HOSPADM

## 2021-08-23 RX ORDER — ASPIRIN 81 MG/1
81 TABLET ORAL
Status: DISCONTINUED | OUTPATIENT
Start: 2021-08-24 | End: 2021-08-26 | Stop reason: HOSPADM

## 2021-08-23 RX ORDER — IBUPROFEN 600 MG/1
600 TABLET, FILM COATED ORAL EVERY MORNING
Status: DISCONTINUED | OUTPATIENT
Start: 2021-08-24 | End: 2021-08-26 | Stop reason: HOSPADM

## 2021-08-23 RX ORDER — BENZONATATE 100 MG/1
100 CAPSULE ORAL AT BEDTIME
COMMUNITY
Start: 2021-08-02

## 2021-08-23 RX ORDER — ALBUTEROL SULFATE 90 UG/1
1-2 AEROSOL, METERED RESPIRATORY (INHALATION) EVERY 4 HOURS PRN
Status: DISCONTINUED | OUTPATIENT
Start: 2021-08-23 | End: 2021-08-26 | Stop reason: HOSPADM

## 2021-08-23 RX ORDER — POLYETHYLENE GLYCOL 400 AND PROPYLENE GLYCOL 4; 3 MG/ML; MG/ML
2 SOLUTION/ DROPS OPHTHALMIC 3 TIMES DAILY PRN
COMMUNITY
Start: 2021-03-11

## 2021-08-23 RX ORDER — LEVOTHYROXINE SODIUM 25 UG/1
50 TABLET ORAL
Status: DISCONTINUED | OUTPATIENT
Start: 2021-08-24 | End: 2021-08-26 | Stop reason: HOSPADM

## 2021-08-23 RX ORDER — DULOXETIN HYDROCHLORIDE 60 MG/1
60 CAPSULE, DELAYED RELEASE ORAL AT BEDTIME
COMMUNITY
Start: 2021-07-13

## 2021-08-23 RX ORDER — ACETAMINOPHEN 325 MG/1
325 TABLET ORAL EVERY 6 HOURS PRN
Status: DISCONTINUED | OUTPATIENT
Start: 2021-08-23 | End: 2021-08-26 | Stop reason: HOSPADM

## 2021-08-23 RX ORDER — IBUPROFEN 200 MG
400 TABLET ORAL AT BEDTIME
Status: DISCONTINUED | OUTPATIENT
Start: 2021-08-23 | End: 2021-08-26 | Stop reason: HOSPADM

## 2021-08-23 RX ORDER — POLYETHYLENE GLYCOL 3350 17 G/17G
17 POWDER, FOR SOLUTION ORAL DAILY PRN
Status: DISCONTINUED | OUTPATIENT
Start: 2021-08-23 | End: 2021-08-26 | Stop reason: HOSPADM

## 2021-08-23 RX ORDER — ACETAMINOPHEN 500 MG
1000 TABLET ORAL 2 TIMES DAILY
COMMUNITY

## 2021-08-23 RX ORDER — ALBUTEROL SULFATE 90 UG/1
1-2 AEROSOL, METERED RESPIRATORY (INHALATION) EVERY 4 HOURS PRN
COMMUNITY

## 2021-08-23 RX ORDER — CYANOCOBALAMIN 1000 UG/ML
1 INJECTION, SOLUTION INTRAMUSCULAR; SUBCUTANEOUS
COMMUNITY
Start: 2021-02-16 | End: 2021-11-22

## 2021-08-23 RX ORDER — OLANZAPINE 2.5 MG/1
2.5 TABLET, FILM COATED ORAL 4 TIMES DAILY PRN
Status: DISCONTINUED | OUTPATIENT
Start: 2021-08-23 | End: 2021-08-26 | Stop reason: HOSPADM

## 2021-08-23 RX ORDER — LEVOTHYROXINE SODIUM 50 UG/1
50 TABLET ORAL
COMMUNITY
Start: 2021-04-29

## 2021-08-23 RX ADMIN — LEVOFLOXACIN 250 MG: 5 INJECTION, SOLUTION INTRAVENOUS at 22:53

## 2021-08-23 RX ADMIN — HYDRALAZINE HYDROCHLORIDE 10 MG: 20 INJECTION INTRAMUSCULAR; INTRAVENOUS at 19:54

## 2021-08-23 RX ADMIN — DULOXETINE HYDROCHLORIDE 60 MG: 60 CAPSULE, DELAYED RELEASE ORAL at 22:51

## 2021-08-23 RX ADMIN — IBUPROFEN 400 MG: 200 TABLET, FILM COATED ORAL at 22:06

## 2021-08-23 RX ADMIN — SODIUM CHLORIDE 500 ML: 9 INJECTION, SOLUTION INTRAVENOUS at 18:43

## 2021-08-23 RX ADMIN — MELATONIN TAB 3 MG 3 MG: 3 TAB at 22:51

## 2021-08-23 RX ADMIN — ACETAMINOPHEN 650 MG: 325 TABLET ORAL at 19:59

## 2021-08-23 RX ADMIN — QUETIAPINE FUMARATE 12.5 MG: 25 TABLET ORAL at 22:51

## 2021-08-23 RX ADMIN — HYDRALAZINE HYDROCHLORIDE 12.5 MG: 25 TABLET, FILM COATED ORAL at 22:51

## 2021-08-23 ASSESSMENT — ACTIVITIES OF DAILY LIVING (ADL)
WEAR_GLASSES_OR_BLIND: YES
DOING_ERRANDS_INDEPENDENTLY_DIFFICULTY: NO
TOILETING_ISSUES: YES
PATIENT_/_FAMILY_COMMUNICATION_STYLE: SPOKEN LANGUAGE (ENGLISH OR BILINGUAL)
WALKING_OR_CLIMBING_STAIRS: AMBULATION DIFFICULTY, ASSISTANCE 1 PERSON;STAIR CLIMBING DIFFICULTY, DEPENDENT;TRANSFERRING DIFFICULTY, ASSISTANCE 1 PERSON
DEPENDENT_IADLS:: COOKING;LAUNDRY;SHOPPING;MEAL PREPARATION;MEDICATION MANAGEMENT;TRANSPORTATION
DRESSING/BATHING_DIFFICULTY: YES
HEARING_DIFFICULTY_OR_DEAF: YES
WALKING_OR_CLIMBING_STAIRS_DIFFICULTY: NO
DESCRIBE_HEARING_LOSS: BILATERAL HEARING LOSS
NUMBER_OF_TIMES_PATIENT_HAS_FALLEN_WITHIN_LAST_SIX_MONTHS: 5
CONCENTRATING,_REMEMBERING_OR_MAKING_DECISIONS_DIFFICULTY: YES
DIFFICULTY_COMMUNICATING: YES
WERE_AUXILIARY_AIDS_OFFERED?: NO
DRESSING/BATHING: BATHING DIFFICULTY, ASSISTANCE 1 PERSON;DRESSING DIFFICULTY, ASSISTANCE 1 PERSON
DIFFICULTY_EATING/SWALLOWING: NO
PATIENT'S_PREFERRED_MEANS_OF_COMMUNICATION: ENGLISH SPEAKER WITH HEARING LOSS, NO SPEECH PROBLEMS.
TOILETING_ASSISTANCE: TOILETING DIFFICULTY, ASSISTANCE 1 PERSON
EQUIPMENT_CURRENTLY_USED_AT_HOME: WALKER, STANDARD
FALL_HISTORY_WITHIN_LAST_SIX_MONTHS: YES

## 2021-08-23 ASSESSMENT — ENCOUNTER SYMPTOMS
NAUSEA: 0
DIZZINESS: 0
VOMITING: 0
CONFUSION: 1
JOINT SWELLING: 0
HEADACHES: 0
COUGH: 0
SORE THROAT: 0
ABDOMINAL PAIN: 0
HEMATURIA: 0
SHORTNESS OF BREATH: 0
DIARRHEA: 0
FEVER: 0
CHILLS: 0
DYSURIA: 0
BACK PAIN: 0

## 2021-08-23 NOTE — PHARMACY-ADMISSION MEDICATION HISTORY
Pharmacy Note - Admission Medication History    Pertinent Provider Information: Med list from past hospital stay in May 2021 and HTP primary notes, Caregiver did not have list with but was able to review medications from memory.  Can not see filling history.     ______________________________________________________________________    Prior To Admission (PTA) med list completed and updated in EMR.       PTA Med List   Medication Sig Last Dose     acetaminophen (TYLENOL) 500 MG tablet Take 1,000 mg by mouth 2 times daily 8/23/2021 at am     albuterol (PROAIR HFA/PROVENTIL HFA/VENTOLIN HFA) 108 (90 Base) MCG/ACT inhaler Inhale 1-2 puffs into the lungs every 4 hours as needed Past Month at Unknown time     aspirin (ASA) 81 MG EC tablet Take 81 mg by mouth daily (with lunch)  8/23/2021 at Unknown time     benzonatate (TESSALON) 200 MG capsule Take 200 mg by mouth At Bedtime 8/22/2021 at Unknown time     cholecalciferol 25 MCG (1000 UT) TABS Take 2,000 Units by mouth daily 8/23/2021 at Unknown time     cyanocobalamin (CYANOCOBALAMIN) 1000 MCG/ML injection Inject 1 mL into the muscle every 30 days Past Week at Unknown time     DULoxetine (CYMBALTA) 60 MG capsule Take 60 mg by mouth At Bedtime 8/22/2021 at Unknown time     famotidine (PEPCID) 20 MG tablet Take 20 mg by mouth 2 times daily 8/23/2021 at am     hydrALAZINE (APRESOLINE) 25 MG tablet Take 12.5 mg by mouth 2 times daily 8/23/2021 at am     hydrOXYzine (ATARAX) 25 MG tablet Take 25 mg by mouth At Bedtime 8/22/2021 at Unknown time     ibuprofen (ADVIL/MOTRIN) 200 MG tablet Take 600 mg by mouth every morning 8/23/2021 at Unknown time     ibuprofen (ADVIL/MOTRIN) 200 MG tablet Take 400 mg by mouth At Bedtime 8/22/2021 at Unknown time     Lactobacillus (PROBIOTIC ACIDOPHILUS PO) Take 1 capsule by mouth daily 8/23/2021 at Unknown time     levothyroxine (SYNTHROID/LEVOTHROID) 50 MCG tablet Take 50 mcg by mouth daily before breakfast  8/23/2021 at Unknown time      loperamide (IMODIUM) 2 MG capsule Take 2 mg by mouth 4 times daily as needed      losartan (COZAAR) 100 MG tablet Take 100 mg by mouth daily (with lunch) 8/23/2021 at Unknown time     medical cannabis (Patient's own supply) Take 1 Dose by mouth See Admin Instructions (The purpose of this order is to document that the patient reports taking medical cannabis.  This is not a prescription, and is not used to certify that the patient has a qualifying medical condition.)    Tangerine capsule  Take 1 capsule in the morning and 1 hour prior to sleep with milk    Emulsion with supper 8/23/2021 at Unknown time     Multiple Vitamins-Minerals (CENTRUM SILVER 50+WOMEN PO) Take 1 tablet by mouth daily 8/23/2021 at Unknown time     Multiple Vitamins-Minerals (PRESERVISION AREDS) CAPS Take 1 capsule by mouth 2 times daily 8/23/2021 at am     polyethylene glycol-propylene glycol (SYSTANE) 0.4-0.3 % SOLN ophthalmic solution Place 2 drops into both eyes 3 times daily as needed Past Week at Unknown time       Information source(s): Caregiver, Hospital records and [facility]  Method of interview communication: in-person    Summary of Changes to PTA Med List  New: entire list was re-entered today, ibu, benzonatate   Discontinued: remains off metoprolol, mirtazapine, Zyrtec, Celebrex, fiber   Changed: none    Patient was asked about OTC/herbal products specifically.  PTA med list reflects this.    In the past week, patient estimated taking medication this percent of the time:  greater than 90%.    Patient does not anticipate needing any multi-use medications during admission.    The information provided in this note is only as accurate as the sources available at the time of the update(s).    Thank you for the opportunity to participate in the care of this patient.    Sarah Almanza [facility]  8/23/2021 5:37 PM

## 2021-08-23 NOTE — ED PROVIDER NOTES
"  Emergency Department Encounter     Evaluation Date & Time:   8/23/2021  4:15 PM    CHIEF COMPLAINT:  Altered Mental Status and UTI      Triage Note:From independent living  Had increased confusion today, caregiver here states has confusion with UTI in past. Has unsteady gait last week.         ED COURSE & MEDICAL DECISION MAKING:        Pt here with home health care provider from \"home instead\" for concerns about increased confusion/weakness since she got there this morning.  Pt with dementia and intermittently confused/delirious, but symptoms this morning consistent with prior UTIs. Pt lives independently, but has staff in home from 9a-9p.  Staff feels she's unsafe to be at home along during the night. Brought her in for evaluation/hospitalization.  Pt did have a fall last week, but walking/moving around after.  No obvious major injuries.  Will get labs, hydrate, hospitalize for further cares.    4:20 PM I met with the patient for the initial interview and physical examination. Discussed plan for treatment and workup in the ED. PPE: Provider wore N95 mask and gloves.  6:03 PM Pt signed out to oncoming physician pending labs.  Pt hospitalized for generalized weakness/fatigue with impaired ADLs. Suspect associated UTI, but labs all pending.  Pt lives independently and needs hospitalization as a result.  6:11 PM I spoke with Dr. Valencia, hospitalist, who accepts for hospitalization. Dr. Ahn to follow up on labs/imaging.    At the conclusion of the encounter I discussed the results of all the tests and the disposition. The questions were answered. The patient or family acknowledged understanding and was agreeable with the care plan.      MEDICATIONS GIVEN IN THE EMERGENCY DEPARTMENT:  Medications   0.9% sodium chloride BOLUS (has no administration in time range)       NEW PRESCRIPTIONS STARTED AT TODAY'S ED VISIT:  New Prescriptions    No medications on file       HPI   HPI     Ani Porter is a 89 year old " female with a pertinent history of hypertension, hypothyroidism, CHF, UTI, and TIA who presents to this ED by walk in accompanied by her caregiver for evaluation of increased confusion and unsteady gait.    Per caregiver, when the patient got up this morning her balance was off and she had difficulty getting to the bathroom. While sitting at breakfast the patient then tried to eat the Kleenex, which is abnormal behavior for the patient per caregiver. The patient does have some confusion at baseline that often worsens throughout the day. Caregiver notes a history of similar symptoms with prior UTI, stating that the patient was hospitalized with a UTI a few months ago before being discharged to a TCU. The patient lives at home independently where she has caregivers from 9am-9pm each day, but she is home alone overnight. Caregiver brought the patient to the ED today because she is concerned that the patient would not be safe on her own tonight. The patient did take a fall ~1 week ago and sustained some bruising; no reported head injury at the time of the fall. No fever, no vomiting today. No new cough or back pain. Patient denies headache or chest pain. Caregiver reports that the patient takes ibuprofen, Tylenol, and cannabis for pain.      Caregiver states that the patient had a reaction to the first dose of the Moderna COVID-19 vaccine, so she did not receive a second dose.     REVIEW OF SYSTEMS:  Review of Systems   Constitutional: Negative for chills and fever.   HENT: Negative for sore throat.    Eyes: Negative for visual disturbance.   Respiratory: Negative for cough and shortness of breath.    Cardiovascular: Negative for chest pain.   Gastrointestinal: Negative for abdominal pain, diarrhea, nausea and vomiting.   Endocrine: Negative for polyuria.   Genitourinary: Negative for dysuria and hematuria.        - urinary changes   Musculoskeletal: Positive for gait problem (unsteady/off balance). Negative for back  pain and joint swelling.   Skin: Negative for rash.   Neurological: Negative for dizziness and headaches.   Psychiatric/Behavioral: Positive for confusion.   All other systems reviewed and are negative.      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 No pertinent family history.    Social History     Tobacco Use     Smoking status: Never Smoker     Smokeless tobacco: Never Used   Substance Use Topics     Alcohol use: Yes     Alcohol/week: 2.5 standard drinks     Comment: Alcoholic Drinks/day: very rarely     Drug use: No       acetaminophen (TYLENOL) 500 MG tablet  albuterol (PROAIR HFA/PROVENTIL HFA/VENTOLIN HFA) 108 (90 Base) MCG/ACT inhaler  aspirin (ASA) 81 MG EC tablet  benzonatate (TESSALON) 200 MG capsule  cholecalciferol 25 MCG (1000 UT) TABS  cyanocobalamin (CYANOCOBALAMIN) 1000 MCG/ML injection  DULoxetine (CYMBALTA) 60 MG capsule  famotidine (PEPCID) 20 MG tablet  hydrALAZINE (APRESOLINE) 25 MG tablet  hydrOXYzine (ATARAX) 25 MG tablet  ibuprofen (ADVIL/MOTRIN) 200 MG tablet  ibuprofen (ADVIL/MOTRIN) 200 MG tablet  Lactobacillus (PROBIOTIC ACIDOPHILUS PO)  levothyroxine (SYNTHROID/LEVOTHROID) 50 MCG tablet  loperamide (IMODIUM) 2 MG  capsule  losartan (COZAAR) 100 MG tablet  medical cannabis (Patient's own supply)  Multiple Vitamins-Minerals (CENTRUM SILVER 50+WOMEN PO)  Multiple Vitamins-Minerals (PRESERVISION AREDS) CAPS  polyethylene glycol-propylene glycol (SYSTANE) 0.4-0.3 % SOLN ophthalmic solution        Physical Exam     Triage Vitals:  ED Triage Vitals [08/23/21 1422]   Enc Vitals Group      BP (!) 161/70      Pulse 82      Resp 20      Temp 97.8  F (36.6  C)      Temp src Tympanic      SpO2 96 %      Weight 72.6 kg (160 lb)      Height       Head Circumference       Peak Flow       Pain Score       Pain Loc       Pain Edu?       Excl. in GC?         Vitals:  BP (!) 161/70   Pulse 82   Temp 97.8  F (36.6  C) (Tympanic)   Resp 20   Wt 72.6 kg (160 lb)   SpO2 96%   BMI 29.26 kg/m      PHYSICAL EXAM:   Physical Exam  Vitals and nursing note reviewed.   Constitutional:       General: She is not in acute distress.     Appearance: Normal appearance.      Comments: Elderly, frail appearing   HENT:      Head: Normocephalic and atraumatic.      Nose: Nose normal.      Mouth/Throat:      Mouth: Mucous membranes are moist.   Eyes:      Pupils: Pupils are equal, round, and reactive to light.   Cardiovascular:      Rate and Rhythm: Normal rate and regular rhythm.      Pulses: Normal pulses.           Radial pulses are 2+ on the right side and 2+ on the left side.        Dorsalis pedis pulses are 2+ on the right side and 2+ on the left side.   Pulmonary:      Effort: Pulmonary effort is normal. No respiratory distress.      Breath sounds: Normal breath sounds.   Abdominal:      Palpations: Abdomen is soft.      Tenderness: There is no abdominal tenderness.   Musculoskeletal:      Cervical back: Full passive range of motion without pain and neck supple.      Comments: No calf tenderness or swelling b/l   Skin:     General: Skin is warm.      Findings: No rash.   Neurological:      General: No focal deficit present.      Mental Status: She is  alert. Mental status is at baseline.      Comments: Fluent speech, no acute lateralizing deficits   Psychiatric:         Mood and Affect: Mood normal.         Behavior: Behavior normal.         Results     LAB:  All pertinent labs reviewed and interpreted  Labs Ordered and Resulted from Time of ED Arrival Up to the Time of Departure from the ED   CBC WITH PLATELETS AND DIFFERENTIAL - Abnormal; Notable for the following components:       Result Value    WBC Count 11.4 (*)     RBC Count 3.21 (*)     Hemoglobin 10.0 (*)     Hematocrit 30.2 (*)     Absolute Neutrophils 8.7 (*)     Absolute Immature Granulocytes 0.1 (*)     All other components within normal limits   CBC WITH PLATELETS & DIFFERENTIAL    Narrative:     The following orders were created for panel order CBC with platelets + differential.  Procedure                               Abnormality         Status                     ---------                               -----------         ------                     CBC with platelets and d...[[id number]]  Abnormal            Final result                 Please view results for these tests on the individual orders.   ROUTINE UA WITH MICROSCOPIC REFLEX TO CULTURE   COMPREHENSIVE METABOLIC PANEL   MAGNESIUM   TROPONIN I   COVID-19 VIRUS (CORONAVIRUS) BY PCR   EXTRA BLOOD CULTURE BOTTLE   EXTRA RED TOP TUBE   EXTRA GREEN TOP (LITHIUM HEPARIN) TUBE   EXTRA PURPLE TOP TUBE   TSH   AMMONIA   VITAMIN B12   FOLATE   PERIPHERAL IV CATHETER   CALL   EXTRA TUBE    Narrative:     The following orders were created for panel order [place] Draw.  Procedure                               Abnormality         Status                     ---------                               -----------         ------                     Extra Blood Culture Bottle[[id number]]                       In process                 Extra Red Top Tube[[id number]]                               In process                 Extra Green Top (Lithium...[[id number]]                       In process                 Extra Purple Top Tube[[id number]]                            In process                   Please view results for these tests on the individual orders.       RADIOLOGY:  XR Chest Port 1 View    (Results Pending)   Head CT w/o contrast    (Results Pending)                ECG:  NSR, rate 72, no acute ischemia    I have independently reviewed and interpreted the EKG(s) documented above     PROCEDURES:  Procedures:  none      FINAL IMPRESSION:    ICD-10-CM    1. Impaired mobility and ADLs  Z74.09     Z78.9    2. Generalized muscle weakness  M62.81        0 minutes of critical care time      I, Erika Norris, am serving as a scribe to document services personally performed by Dr. Jose G Ferguson, based on my observations and the provider's statements to me. I, Jose G Ferguson, DO attest that Erika Norris is acting in a scribe capacity, has observed my performance of the services and has documented them in accordance with my direction.      Jose G Ferguson DO  Emergency Medicine  [facility] EMERGENCY DEPARTMENT  8/23/2021  4:16 PM                 Jose G Ferguson MD  08/23/21 1173

## 2021-08-23 NOTE — ED PROVIDER NOTES
EMERGENCY DEPARTMENT PROGRESS NOTE         ED COURSE AND MEDICAL DECISION MAKING  Patient was signed out to me by Dr. Jose G Ferguson at 6:07 PM. Will follow up on workup and pending admission to hospital.     Ani Porter is a 89 year old female with a pertinent history of hypertension, hypothyroidism, CHF, UTI, and TIA, who presents to this ED by walk in accompanied by her caregiver for evaluation of increased confusion and unsteady gait.    Per caregiver, when the patient got up this morning her balance was off and she had difficulty getting to the bathroom. While sitting at breakfast the patient then tried to eat the Kleenex, which is abnormal behavior for the patient per caregiver. The patient does have some confusion at baseline that often worsens throughout the day. Caregiver notes a history of similar symptoms with prior UTI, stating that the patient was hospitalized with a UTI a few months ago before being discharged to a TCU. The patient lives at home independently where she has caregivers from 9am-9pm each day, but she is home alone overnight. Caregiver brought the patient to the ED today because she is concerned that the patient would not be safe on her own tonight. The patient did take a fall ~1 week ago and sustained some bruising; no reported head injury at the time of the fall. No fever, no vomiting today. No new cough or back pain. Patient denies headache or chest pain. Caregiver reports that the patient takes ibuprofen, Tylenol, and cannabis for pain.       Caregiver states that the patient had a reaction to the first dose of the Moderna COVID-19 vaccine, so she did not receive a second dose.     Patient went upstairs before I had a chance to review his results.  Regardless, she has an unremarkable ct head, and her labs show the suspected uti.  She has a mildly low magnesium but is no longer in the department for me to order replacement.      Medications   albuterol (PROAIR HFA/PROVENTIL  HFA/VENTOLIN HFA) 108 (90 Base) MCG/ACT inhaler 1-2 puff (has no administration in time range)   aspirin EC tablet 81 mg (has no administration in time range)   Vitamin D3 (CHOLECALCIFEROL) tablet 2,000 Units (has no administration in time range)   DULoxetine (CYMBALTA) DR capsule 60 mg (has no administration in time range)   famotidine (PEPCID) tablet 20 mg (has no administration in time range)   hydrALAZINE (APRESOLINE) half-tab 12.5 mg (has no administration in time range)   levothyroxine (SYNTHROID/LEVOTHROID) tablet 50 mcg (has no administration in time range)   losartan (COZAAR) tablet 100 mg (has no administration in time range)   melatonin tablet 3 mg (has no administration in time range)   polyethylene glycol (MIRALAX) Packet 17 g (has no administration in time range)   ondansetron (ZOFRAN-ODT) ODT tab 4 mg ( Oral See Alternative 8/23/21 2156)     Or   ondansetron (ZOFRAN) injection 4 mg (4 mg Intravenous Given 8/23/21 2156)   calcium carbonate (TUMS) chewable tablet 1,000 mg (has no administration in time range)   hydrALAZINE (APRESOLINE) injection 10 mg (10 mg Intravenous Given 8/23/21 1954)   levofloxacin (LEVAQUIN) infusion 250 mg (250 mg Intravenous New Bag 8/23/21 2156)   QUEtiapine (SEROquel) half-tab 12.5 mg (has no administration in time range)   OLANZapine (zyPREXA) tablet 2.5 mg (has no administration in time range)   ibuprofen (ADVIL/MOTRIN) tablet 600 mg (has no administration in time range)   ibuprofen (ADVIL/MOTRIN) tablet 400 mg (400 mg Oral Given 8/23/21 2206)   acetaminophen (TYLENOL) tablet 325 mg (has no administration in time range)   acetaminophen (TYLENOL) tablet 975 mg (has no administration in time range)   0.9% sodium chloride BOLUS (0 mLs Intravenous Stopped 8/23/21 1915)     Current Discharge Medication List          LAB  Pertinent labs results reviewed   Results for orders placed or performed during the hospital encounter of 08/23/21   XR Chest Port 1 View     Status: None     Narrative    EXAM: XR CHEST PORT 1 VIEW  LOCATION: [facility]  DATE/TIME: 8/23/2021 5:40 PM    INDICATION: weakness  COMPARISON: 05/06/2021      Impression    IMPRESSION: Some stable minimal fibrosis in lung bases. Chest otherwise negative. No acute new findings.   Head CT w/o contrast     Status: None    Narrative    EXAM: CT HEAD W/O CONTRAST  LOCATION: [facility]  DATE/TIME: 8/23/2021 6:35 PM    INDICATION: Head injury  COMPARISON: 05/06/2021  TECHNIQUE: Routine CT Head without IV contrast. Multiplanar reformats. Dose reduction techniques were used.    FINDINGS:  INTRACRANIAL CONTENTS: No intracranial hemorrhage, extraaxial collection, or mass effect.  No CT evidence of acute infarct. Moderate presumed chronic small vessel ischemic changes. Moderate generalized volume loss. No hydrocephalus.     VISUALIZED ORBITS/SINUSES/MASTOIDS: No intraorbital abnormality. No paranasal sinus mucosal disease. No middle ear or mastoid effusion.    BONES/SOFT TISSUES: No acute abnormality.      Impression    IMPRESSION:  1.  No acute intracranial process.   UA with Microscopic reflex to Culture     Status: Abnormal    Specimen: Urine, Clean Catch   Result Value Ref Range    Color Urine Yellow Colorless, Straw, Light Yellow, Yellow    Appearance Urine Turbid (A) Clear    Glucose Urine Negative Negative mg/dL    Bilirubin Urine Negative Negative    Ketones Urine Negative Negative mg/dL    Specific Gravity Urine 1.019 1.001 - 1.030    Blood Urine 1.0 mg/dL (A) Negative    pH Urine 6.0 5.0 - 7.0    Protein Albumin Urine 200  (A) Negative mg/dL    Urobilinogen Urine <2.0 <2.0 mg/dL    Nitrite Urine Negative Negative    Leukocyte Esterase Urine 500 Claudette/uL (A) Negative    RBC Urine 104 (H) <=2 /HPF    WBC Urine 97 (H) <=5 /HPF    Squamous Epithelials Urine 1 <=1 /HPF    Transitional Epithelials Urine <1 <=1 /HPF    Narrative    Urine Culture ordered based on laboratory criteria   CBC  with platelets + differential     Status: Abnormal    Narrative    The following orders were created for panel order CBC with platelets + differential.  Procedure                               Abnormality         Status                     ---------                               -----------         ------                     CBC with platelets and d...[[id number]]  Abnormal            Final result                 Please view results for these tests on the individual orders.   Comprehensive metabolic panel     Status: Abnormal   Result Value Ref Range    Sodium 135 (L) 136 - 145 mmol/L    Potassium 3.8 3.5 - 5.0 mmol/L    Chloride 99 98 - 107 mmol/L    Carbon Dioxide (CO2) 28 22 - 31 mmol/L    Anion Gap 8 5 - 18 mmol/L    Urea Nitrogen 18 8 - 28 mg/dL    Creatinine 0.92 0.60 - 1.10 mg/dL    Calcium 9.4 8.5 - 10.5 mg/dL    Glucose 139 (H) 70 - 125 mg/dL    Alkaline Phosphatase 60 45 - 120 U/L    AST 14 0 - 40 U/L    ALT 10 0 - 45 U/L    Protein Total 6.6 6.0 - 8.0 g/dL    Albumin 3.8 3.5 - 5.0 g/dL    Bilirubin Total 0.9 0.0 - 1.0 mg/dL    GFR Estimate 54 (L) >60 mL/min/1.73m2   Magnesium     Status: Abnormal   Result Value Ref Range    Magnesium 1.6 (L) 1.8 - 2.6 mg/dL   Troponin I     Status: Normal   Result Value Ref Range    Troponin I 0.08 0.00 - 0.29 ng/mL   Asymptomatic COVID-19 Virus (Coronavirus) by PCR Nasopharyngeal     Status: Normal    Specimen: Nasopharyngeal; Swab   Result Value Ref Range    SARS CoV2 PCR Negative Negative    Narrative    Testing was performed using the karmen  SARS-CoV-2 & Influenza A/B Assay on the karmen  Jessy  System.  This test should be ordered for the detection of SARS-COV-2 in individuals who meet SARS-CoV-2 clinical and/or epidemiological criteria. Test performance is unknown in asymptomatic patients.  This test is for in vitro diagnostic use under the FDA EUA for laboratories certified under CLIA to perform moderate and/or high complexity testing. This test has not been FDA  cleared or approved.  A negative test does not rule out the presence of PCR inhibitors in the specimen or target RNA in concentration below the limit of detection for the assay. The possibility of a false negative should be considered if the patient's recent exposure or clinical presentation suggests COVID-19.  [facility] are certified under the Clinical Laboratory Improvement Amendments of 1988 (CLIA-88) as qualified to perform moderate and/or high complexity laboratory testing.   Extra Blood Culture Bottle     Status: None   Result Value Ref Range    Hold Specimen JIC    Extra Red Top Tube     Status: None   Result Value Ref Range    Hold Specimen JIC    Extra Green Top (Lithium Heparin) Tube     Status: None   Result Value Ref Range    Hold Specimen JIC    Extra Purple Top Tube     Status: None   Result Value Ref Range    Hold Specimen JIC    CBC with platelets and differential     Status: Abnormal   Result Value Ref Range    WBC Count 11.4 (H) 4.0 - 11.0 10e3/uL    RBC Count 3.21 (L) 3.80 - 5.20 10e6/uL    Hemoglobin 10.0 (L) 11.7 - 15.7 g/dL    Hematocrit 30.2 (L) 35.0 - 47.0 %    MCV 94 78 - 100 fL    MCH 31.2 26.5 - 33.0 pg    MCHC 33.1 31.5 - 36.5 g/dL    RDW 12.7 10.0 - 15.0 %    Platelet Count 276 150 - 450 10e3/uL    % Neutrophils 77 %    % Lymphocytes 12 %    % Monocytes 10 %    % Eosinophils 1 %    % Basophils 0 %    % Immature Granulocytes 0 %    NRBCs per 100 WBC 0 <1 /100    Absolute Neutrophils 8.7 (H) 1.6 - 8.3 10e3/uL    Absolute Lymphocytes 1.4 0.8 - 5.3 10e3/uL    Absolute Monocytes 1.2 0.0 - 1.3 10e3/uL    Absolute Eosinophils 0.1 0.0 - 0.7 10e3/uL    Absolute Basophils 0.0 0.0 - 0.2 10e3/uL    Absolute Immature Granulocytes 0.1 (H) <=0.0 10e3/uL    Absolute NRBCs 0.0 10e3/uL   TSH     Status: Normal   Result Value Ref Range    TSH 1.32 0.30 - 5.00 uIU/mL   Social Work/ Care Management IP Consult     Status: None ()    Narrative    Kim Ayala RN     8/23/2021  7:17  PM  Care Management Initial Consult    General Information  Assessment completed with: Patient, Care Team Member,  (patient,   care giver, family)  Type of CM/SW Visit: Initial Assessment    Primary Care Provider verified and updated as needed: Yes   Readmission within the last 30 days:        Reason for Consult: discharge planning  Advance Care Planning:            Communication Assessment  Patient's communication style: spoken language (English or   Bilingual)             Cognitive  Cognitive/Neuro/Behavioral: .WDL except  Level of Consciousness:   confused                   Living Environment:   People in home:       Current living Arrangements: house      Able to return to prior arrangements: yes       Family/Social Support:  Care provided by: homecare agency  Provides care for: no one                Description of Support System:           Current Resources:   Patient receiving home care services: Yes  Skilled Home Care Services: Home Health Aid  Community Resources: Home Care, Transportation Services  Equipment currently used at home: walker, rolling, wheelchair,   manual  Supplies currently used at home: Incontinence Supplies    Employment/Financial:  Employment Status:          Financial Concerns:             Lifestyle & Psychosocial Needs:  Social Determinants of Health     Tobacco Use: Low Risk      Smoking Tobacco Use: Never Smoker     Smokeless Tobacco Use: Never Used   Alcohol Use:      Frequency of Alcohol Consumption:      Average Number of Drinks:      Frequency of Binge Drinking:    Financial Resource Strain:      Difficulty of Paying Living Expenses:    Food Insecurity:      Worried About Running Out of Food in the Last Year:      Ran Out of Food in the Last Year:    Transportation Needs:      Lack of Transportation (Medical):      Lack of Transportation (Non-Medical):    Physical Activity:      Days of Exercise per Week:      Minutes of Exercise per Session:    Stress:      Feeling of Stress :     Social Connections:      Frequency of Communication with Friends and Family:      Frequency of Social Gatherings with Friends and Family:      Attends Pent[employer]al Services:      Active Member of Clubs or Organizations:      Attends Club or Organization Meetings:      Marital Status:    Intimate Partner Violence:      Fear of Current or Ex-Partner:      Emotionally Abused:      Physically Abused:      Sexually Abused:    Depression:      PHQ-2 Score:    Housing Stability:      Unable to Pay for Housing in the Last Year:      Number of Places Lived in the Last Year:      Unstable Housing in the Last Year:        Functional Status:  Prior to admission patient needed assistance:   Dependent ADLs:: Ambulation-walker, Wheelchair-with assist  Dependent IADLs:: Cooking, Laundry, Shopping, Meal Preparation,   Medication Management, Transportation  Assesssment of Functional Status: Not at baseline with ADL   Functioning    Mental Health Status:          Chemical Dependency Status:                Values/Beliefs:  Spiritual, Cultural Beliefs, Pentec[employer]al Practices, Values that   affect care:                 Additional Information:  Writer spoke with patient's care giver (HHA with [facility])   Aliya: [phone]. Patient gets care from 9- 9:30 pm seven days   a week. She uses a walker, gets assist with dressing, bathing,   meds, meals, housekeeping, shopping. Patient has HCD and per care   giver is DNR/DNI. Today came to ED confused, unsteady. Has UTI.   Patient's son is Isael: [phone]; message left with him. Aliya   said he likes that she can help with information. She has been   patient's care giver for the lat 7 years.     STEWART/Observations status reviewed. Paper work given.     Anticipate discharge with additional home care services through   [facility]: nurse, PT, OT. Fam to transport. Informed CM to   follow.     Kim Ayala RN, CM, [facility] Draw     Status: None    Narrative    The following  orders were created for panel order [place] Draw.  Procedure                               Abnormality         Status                     ---------                               -----------         ------                     Extra Blood Culture Bottle[[id number]]                       Final result               Extra Red Top Tube[[id number]]                               Final result               Extra Green Top (Lithium...[[id number]]                      Final result               Extra Purple Top Tube[[id number]]                            Final result                 Please view results for these tests on the individual orders.           RADIOLOGY    Pertinent imaging reviewed   Please see official radiology report.  Head CT w/o contrast   Final Result   IMPRESSION:   1.  No acute intracranial process.      XR Chest Port 1 View   Final Result   IMPRESSION: Some stable minimal fibrosis in lung bases. Chest otherwise negative. No acute new findings.          FINAL IMPRESSION    1. Urinary tract infection with hematuria, site unspecified    2. Impaired mobility and ADLs    3. Generalized muscle weakness    4. Delirium            Sophie Ahn MD  08/23/21 5608

## 2021-08-23 NOTE — ED TRIAGE NOTES
From independent living  Had increased confusion today, caregiver here states has confusion with UTI in past. Has unsteady gait last week.

## 2021-08-23 NOTE — H&P
[facility] [place] [facility] [place] and Physical    Aniac Porter  [facility] [address]   [facility] [address]  : 1929  Admission Date/Time: 2021  4:15 PM    Primary Care Provider / Referring Physician: Archana Patel  [facility] Attending Physician:  Chuck Valencia DO     Assessment:     Principal Problem:    Generalized muscle weakness  Active Problems:    Anxiety    Asthma    CHF (congestive heart failure) (H)    Chronic diastolic heart failure (H)    Fibromyalgia    Hypothyroidism    Impaired mobility and ADLs    Irritable bowel syndrome    Peripheral neuropathy    Personal history of TIA (transient ischemic attack)    Hypomagnesemia      Plan:    89-year-old female with HFpEF, HTN, recurrent urinary tract infections, dementia, hypothyroidism, asthma, IBS and history of prior TIA presents with diffuse weakness and confusion        Weakness and confusion: Concern for possible metabolic encephalopathy, patient with history of multiple UTIs as well as underlying dementia  --Check ammonia, TSH, vitamin B12 and folate  --Exclude UTI.  Based on prior urine cultures and patient's drug allergies, if UA is concerning for infection will start IV Levaquin.  --Check head CT  --Hold home medical cannabis and Atarax at bedtime       Hypertension: Continue home hydralazine, losartan        HFpEF  --No evidence of exacerbation, continue home hydralazine and losartan     Paroxysmal atrial fibrillation  --Not on any rate controlling medications or anticoagulation at this time     Asthma  --Continue home inhalers     Hypothyroidism  --Continue home replacement and check TSH as above     Fibromyalgia and chronic pain with DJD  --Hold home medical THC  --Avoid opiates, patient has had adverse reactions to this in the past with documentation of no benefit in pain.       PRABHJOT  --CPAP per home settings     GERD  --Continue home famotidine             DVT PPX: SCD    Code status:  DNR        Chuck Valencia  D.O.          _____________________________________________________________  CHIEF COMPLAINT:    Weakness, confusion    HPI:  89-year-old female with HFpEF, HTN, recurrent urinary tract infections, dementia, hypothyroidism, asthma, IBS and history of prior TIA presents with diffuse weakness and confusion.  Patient presents with unsteady gait and increased confusion since this morning.  Patient had difficulty using the bathroom this morning.  She then try to eat a Kleenex for breakfast.  There are home caregiver concerns about patient being able to care for herself safely.  Currently the patient denies any complaints besides diffuse pain.  Remainder of ROS negative. Denies any other exacerbating or improving factors.            ALLERGIES/SENSITIVITIES:   Allergies   Allergen Reactions     Other Allergy (See Comments) [External Allergen Needs Reconciliation - See Comment] Other (See Comments)     Patient Reports Reaction to FLU SHOT - Egg allergy!!, FLU SHOT - Egg allergy, PN: FLU SHOT - Egg allergy     Citalopram Analogues [Citalopram] Unknown and Other (See Comments)     Per pt and outside records     Demeclocycline Unknown     Doxycycline Unknown and Other (See Comments)     Per pt and outside records;     Erythromycin Unknown and Other (See Comments)     Per pt and outside records;      Erythromycin Ethylsuccinate [Erythromycin] Unknown     Hydromorphone Other (See Comments)     Depression, PN: Depression, Patient reports having depression     Iodine And Iodide Containing Products [Iodine] Other (See Comments) and Unknown     unknown, Per pt and outside records, pt's son confirms she is allergic to Iodine, PN: unknown     Sulfa (Sulfonamide Antibiotics) [Sulfa Drugs] Unknown, Other (See Comments) and Nausea and Vomiting     Confusion,, Other reaction(s): Confusion, Other (See Comments), confusion     Sulfasalazine Nausea and Vomiting     Terfenadine Unknown and Other (See Comments)     Other reaction(s): *Unknown,  Per pt and outside records;     Tetracycline Other (See Comments) and Unknown     Per pt and outside records     Venlafaxine Analogues [Venlafaxine] Unknown     Dizziness and confusion     Cephalosporins Nausea and Vomiting, Itching, Other (See Comments) and Rash     Itching     Prednisone Unknown, Anxiety and Other (See Comments)     Other reaction(s): Confusion, Hallucinations, Confusion, Confusion, hallucination,       (Not in a hospital admission)      Past Medical History:   Diagnosis Date     Anxiety      CHF (congestive heart failure) (H)      Deafness in right ear      Depression      Fibromyalgia      History of transfusion      HTN (hypertension)      Hypothyroidism      Incontinent of urine      Insomnia      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     Osteoarthritis      TIA (transient ischemic attack)      UTI (urinary tract infection) 9/19/2016     Vitamin B12 deficiency (non anemic) 3/11/2021    Overview:  getting monthly injections       Past Surgical History:   Procedure Laterality Date     APPENDECTOMY       ARTHROPLASTY KNEE BILATERAL       BACK SURGERY      CAN'T REMEMBER WHAT THE SURGERY WAS FOR     EYE SURGERY      CAN'T REMEMBER WHAT KIND OF EYE SURGERY     HYSTERECTOMY       OTHER SURGICAL HISTORY      BLADDER STIMULATOR       REVIEW OF SYSTEMS:   Constitutional: no fever, chills, or sweats  Eyes: No visual disturbance or irritation  ENT: No nose or throat congestion or pain  Respiratory: No wheezes, cough, shortness of breath, or pain with breathing  Cardiovascular: No chest pain or palpitations  Gastrointestinal: No nausea, vomiting, diarrhea, dyspepsia, or pain  Genitourinary: No urgency, frequency, or dysuria  Integument/breast: No rash, pruritis, or lesion  Hematologic/lymphatic: No bleeding or unusual bruising  Musculoskeletal:as above  Neurological: No headache, arm or leg numbness or focal weakness  Psychiatric: No anxiety, or depression, or  hallucinations  Endocrine: No appetite disturbance, sleep disturbance, or unusual weight loss or gain  Allergic/Immunologic: No hives, allergic swelling or wheeze or rhinitis  All other systems on reveiw are negative.    Social History     Socioeconomic History     Marital status:      Spouse name: Not on file     Number of children: Not on file     Years of education: Not on file     Highest education level: Not on file   Occupational History     Not on file   Tobacco Use     Smoking status: Never Smoker     Smokeless tobacco: Never Used   Substance and Sexual Activity     Alcohol use: Yes     Alcohol/week: 2.5 standard drinks     Comment: Alcoholic Drinks/day: very rarely     Drug use: No     Sexual activity: Not on file   Other Topics Concern     Not on file   Social History Narrative    Patient lives at alone in an independent senior facility.     Social Determinants of Health     Financial Resource Strain:      Difficulty of Paying Living Expenses:    Food Insecurity:      Worried About Running Out of Food in the Last Year:      Ran Out of Food in the Last Year:    Transportation Needs:      Lack of Transportation (Medical):      Lack of Transportation (Non-Medical):    Physical Activity:      Days of Exercise per Week:      Minutes of Exercise per Session:    Stress:      Feeling of Stress :    Social Connections:      Frequency of Communication with Friends and Family:      Frequency of Social Gatherings with Friends and Family:      Attends [employer] Services:      Active Member of Clubs or Organizations:      Attends Club or Organization Meetings:      Marital Status:    Intimate Partner Violence:      Fear of Current or Ex-Partner:      Emotionally Abused:      Physically Abused:      Sexually Abused:         History reviewed. No pertinent family history.    PHYSICAL EXAM:  General Appearance: In no acute distress  BP (!) 161/70   Pulse 82   Temp 97.8  F (36.6  C) (Tympanic)   Resp 20   Wt 72.6  kg (160 lb)   SpO2 96%   BMI 29.26 kg/m    EYES: Clear, without inflammation   HEENT: Without congestion or inflammation  RESPIRATORY: Clear to auscultation  CARDIOVASCULAR: No le edema bilat.  ABDOMEN: soft and non-tender  RECTAL: deferred  GENITOURINARY: deferred  NEUROLOGIC: No focal arm or leg  weakness, speech is clear  PSYCHIATRIC: Oriented to self only, pleasantly confused    Labs Reviewed:   Recent Results (from the past 24 hour(s))   Comprehensive metabolic panel    Collection Time: 08/23/21  5:41 PM   Result Value Ref Range    Sodium 135 (L) 136 - 145 mmol/L    Potassium 3.8 3.5 - 5.0 mmol/L    Chloride 99 98 - 107 mmol/L    Carbon Dioxide (CO2) 28 22 - 31 mmol/L    Anion Gap 8 5 - 18 mmol/L    Urea Nitrogen 18 8 - 28 mg/dL    Creatinine 0.92 0.60 - 1.10 mg/dL    Calcium 9.4 8.5 - 10.5 mg/dL    Glucose 139 (H) 70 - 125 mg/dL    Alkaline Phosphatase 60 45 - 120 U/L    AST 14 0 - 40 U/L    ALT 10 0 - 45 U/L    Protein Total 6.6 6.0 - 8.0 g/dL    Albumin 3.8 3.5 - 5.0 g/dL    Bilirubin Total 0.9 0.0 - 1.0 mg/dL    GFR Estimate 54 (L) >60 mL/min/1.73m2   Magnesium    Collection Time: 08/23/21  5:41 PM   Result Value Ref Range    Magnesium 1.6 (L) 1.8 - 2.6 mg/dL   CBC with platelets and differential    Collection Time: 08/23/21  5:41 PM   Result Value Ref Range    WBC Count 11.4 (H) 4.0 - 11.0 10e3/uL    RBC Count 3.21 (L) 3.80 - 5.20 10e6/uL    Hemoglobin 10.0 (L) 11.7 - 15.7 g/dL    Hematocrit 30.2 (L) 35.0 - 47.0 %    MCV 94 78 - 100 fL    MCH 31.2 26.5 - 33.0 pg    MCHC 33.1 31.5 - 36.5 g/dL    RDW 12.7 10.0 - 15.0 %    Platelet Count 276 150 - 450 10e3/uL    % Neutrophils 77 %    % Lymphocytes 12 %    % Monocytes 10 %    % Eosinophils 1 %    % Basophils 0 %    % Immature Granulocytes 0 %    NRBCs per 100 WBC 0 <1 /100    Absolute Neutrophils 8.7 (H) 1.6 - 8.3 10e3/uL    Absolute Lymphocytes 1.4 0.8 - 5.3 10e3/uL    Absolute Monocytes 1.2 0.0 - 1.3 10e3/uL    Absolute Eosinophils 0.1 0.0 - 0.7 10e3/uL     Absolute Basophils 0.0 0.0 - 0.2 10e3/uL    Absolute Immature Granulocytes 0.1 (H) <=0.0 10e3/uL    Absolute NRBCs 0.0 10e3/uL

## 2021-08-24 ENCOUNTER — APPOINTMENT (OUTPATIENT)
Dept: PHYSICAL THERAPY | Facility: HOSPITAL | Age: 86
End: 2021-08-24
Attending: INTERNAL MEDICINE
Payer: MEDICARE

## 2021-08-24 ENCOUNTER — APPOINTMENT (OUTPATIENT)
Dept: OCCUPATIONAL THERAPY | Facility: HOSPITAL | Age: 86
End: 2021-08-24
Attending: INTERNAL MEDICINE
Payer: MEDICARE

## 2021-08-24 LAB
AMMONIA PLAS-SCNC: 32 UMOL/L (ref 11–35)
ANION GAP SERPL CALCULATED.3IONS-SCNC: 12 MMOL/L (ref 5–18)
ATRIAL RATE - MUSE: 72 BPM
BUN SERPL-MCNC: 15 MG/DL (ref 8–28)
CALCIUM SERPL-MCNC: 9.2 MG/DL (ref 8.5–10.5)
CHLORIDE BLD-SCNC: 99 MMOL/L (ref 98–107)
CO2 SERPL-SCNC: 24 MMOL/L (ref 22–31)
CREAT SERPL-MCNC: 0.73 MG/DL (ref 0.6–1.1)
DIASTOLIC BLOOD PRESSURE - MUSE: NORMAL MMHG
ERYTHROCYTE [DISTWIDTH] IN BLOOD BY AUTOMATED COUNT: 12.8 % (ref 10–15)
GFR SERPL CREATININE-BSD FRML MDRD: 72 ML/MIN/1.73M2
GLUCOSE BLD-MCNC: 131 MG/DL (ref 70–125)
HCT VFR BLD AUTO: 35.3 % (ref 35–47)
HGB BLD-MCNC: 11.7 G/DL (ref 11.7–15.7)
INTERPRETATION ECG - MUSE: NORMAL
MAGNESIUM SERPL-MCNC: 1.6 MG/DL (ref 1.8–2.6)
MCH RBC QN AUTO: 30.7 PG (ref 26.5–33)
MCHC RBC AUTO-ENTMCNC: 33.1 G/DL (ref 31.5–36.5)
MCV RBC AUTO: 93 FL (ref 78–100)
P AXIS - MUSE: 79 DEGREES
PLATELET # BLD AUTO: 215 10E3/UL (ref 150–450)
POTASSIUM BLD-SCNC: 3.6 MMOL/L (ref 3.5–5)
PR INTERVAL - MUSE: 158 MS
QRS DURATION - MUSE: 74 MS
QT - MUSE: 356 MS
QTC - MUSE: 389 MS
R AXIS - MUSE: -10 DEGREES
RBC # BLD AUTO: 3.81 10E6/UL (ref 3.8–5.2)
SODIUM SERPL-SCNC: 135 MMOL/L (ref 136–145)
SYSTOLIC BLOOD PRESSURE - MUSE: NORMAL MMHG
T AXIS - MUSE: 75 DEGREES
VENTRICULAR RATE- MUSE: 72 BPM
WBC # BLD AUTO: 9.4 10E3/UL (ref 4–11)

## 2021-08-24 PROCEDURE — 99225 PR SUBSEQUENT OBSERVATION CARE,LEVEL II: CPT | Performed by: INTERNAL MEDICINE

## 2021-08-24 PROCEDURE — 36415 COLL VENOUS BLD VENIPUNCTURE: CPT | Performed by: INTERNAL MEDICINE

## 2021-08-24 PROCEDURE — 97535 SELF CARE MNGMENT TRAINING: CPT | Mod: GO

## 2021-08-24 PROCEDURE — 250N000013 HC RX MED GY IP 250 OP 250 PS 637: Performed by: INTERNAL MEDICINE

## 2021-08-24 PROCEDURE — G0378 HOSPITAL OBSERVATION PER HR: HCPCS

## 2021-08-24 PROCEDURE — 97162 PT EVAL MOD COMPLEX 30 MIN: CPT | Mod: GP

## 2021-08-24 PROCEDURE — 82140 ASSAY OF AMMONIA: CPT | Performed by: INTERNAL MEDICINE

## 2021-08-24 PROCEDURE — 83735 ASSAY OF MAGNESIUM: CPT | Performed by: INTERNAL MEDICINE

## 2021-08-24 PROCEDURE — 97166 OT EVAL MOD COMPLEX 45 MIN: CPT | Mod: GO

## 2021-08-24 PROCEDURE — 80048 BASIC METABOLIC PNL TOTAL CA: CPT | Performed by: INTERNAL MEDICINE

## 2021-08-24 PROCEDURE — 97530 THERAPEUTIC ACTIVITIES: CPT | Mod: GP

## 2021-08-24 PROCEDURE — 85027 COMPLETE CBC AUTOMATED: CPT | Performed by: INTERNAL MEDICINE

## 2021-08-24 PROCEDURE — 250N000011 HC RX IP 250 OP 636: Performed by: INTERNAL MEDICINE

## 2021-08-24 RX ADMIN — MELATONIN TAB 3 MG 3 MG: 3 TAB at 22:59

## 2021-08-24 RX ADMIN — LEVOFLOXACIN 250 MG: 5 INJECTION, SOLUTION INTRAVENOUS at 19:44

## 2021-08-24 RX ADMIN — HYDRALAZINE HYDROCHLORIDE 12.5 MG: 25 TABLET, FILM COATED ORAL at 20:57

## 2021-08-24 RX ADMIN — IBUPROFEN 400 MG: 200 TABLET, FILM COATED ORAL at 20:56

## 2021-08-24 RX ADMIN — ONDANSETRON 4 MG: 2 INJECTION INTRAMUSCULAR; INTRAVENOUS at 10:53

## 2021-08-24 RX ADMIN — ACETAMINOPHEN 975 MG: 325 TABLET ORAL at 20:56

## 2021-08-24 RX ADMIN — ACETAMINOPHEN 975 MG: 325 TABLET ORAL at 14:33

## 2021-08-24 RX ADMIN — ACETAMINOPHEN 975 MG: 325 TABLET ORAL at 11:01

## 2021-08-24 RX ADMIN — HYDRALAZINE HYDROCHLORIDE 12.5 MG: 25 TABLET, FILM COATED ORAL at 09:40

## 2021-08-24 RX ADMIN — DULOXETINE HYDROCHLORIDE 60 MG: 60 CAPSULE, DELAYED RELEASE ORAL at 20:56

## 2021-08-24 RX ADMIN — Medication 2000 UNITS: at 09:32

## 2021-08-24 RX ADMIN — LOSARTAN POTASSIUM 100 MG: 25 TABLET, FILM COATED ORAL at 14:33

## 2021-08-24 RX ADMIN — ACETAMINOPHEN 325 MG: 325 TABLET ORAL at 06:06

## 2021-08-24 RX ADMIN — ASPIRIN 81 MG: 81 TABLET, COATED ORAL at 14:32

## 2021-08-24 RX ADMIN — FAMOTIDINE 20 MG: 20 TABLET, FILM COATED ORAL at 09:31

## 2021-08-24 RX ADMIN — IBUPROFEN 600 MG: 600 TABLET, FILM COATED ORAL at 09:30

## 2021-08-24 RX ADMIN — QUETIAPINE FUMARATE 12.5 MG: 25 TABLET ORAL at 22:59

## 2021-08-24 RX ADMIN — LEVOTHYROXINE SODIUM 50 MCG: 0.03 TABLET ORAL at 06:22

## 2021-08-24 NOTE — PROGRESS NOTES
[facility]      OUTPATIENT OCCUPATIONAL THERAPY  EVALUATION  PLAN OF TREATMENT FOR OUTPATIENT REHABILITATION  (COMPLETE FOR INITIAL CLAIMS ONLY)  Patient's Last Name, First Name, M.I.  YOB: 1929  Ani Porter                          Provider's Name  [facility] Medical Record No.  [id number]                               Onset Date:  (P) 08/23/21   Start of Care Date:  (P) 08/24/21     Type:     ___PT   _X_OT   ___SLP Medical Diagnosis:  (P) generalized muscle weakness                        OT Diagnosis:      Visits from SOC:  1   _________________________________________________________________________________  Plan of Treatment/Functional Goals    Planned Interventions:     Goals: See Occupational Therapy Goals on Care Plan in [employer] electronic health record.    Therapy Frequency: (P) 1x eval and treat  Predicted Duration of Therapy Intervention:    _________________________________________________________________________________    I CERTIFY THE NEED FOR THESE SERVICES FURNISHED UNDER        THIS PLAN OF TREATMENT AND WHILE UNDER MY CARE .             Physician Signature               Date    X_____________________________________________________                      Certification date from: (P) 08/24/21, Certification date to: (P) 08/31/21    Referring Physician: efra reyes (P)            Initial Assessment        See Occupational Therapy evaluation dated (P) 08/24/21 in Epic electronic health record.

## 2021-08-24 NOTE — PROGRESS NOTES
[facility]      OUTPATIENT PHYSICAL THERAPY EVALUATION  PLAN OF TREATMENT FOR OUTPATIENT REHABILITATION  (COMPLETE FOR INITIAL CLAIMS ONLY)  Patient's Last Name, First Name, M.I.  YOB: 1929  Ani Porter                        Provider's Name  [facility] Medical Record No.  [id number]                               Onset Date:  08/23/21   Start of Care Date:  (P) 08/24/21      Type:     _X_PT   ___OT   ___SLP Medical Diagnosis:  (P) generalized muscle weakness                        PT Diagnosis:  difficulty walking   Visits from SOC:  1   _________________________________________________________________________________  Plan of Treatment/Functional Goals    Planned Interventions: balance training, bed mobility training, gait training, home exercise program, neuromuscular re-education, patient/family education, transfer training, stair training     Goals: See Physical Therapy Goals on Care Plan in [employer] electronic health record.    Therapy Frequency: Daily  Predicted Duration of Therapy Intervention: 1 week  _________________________________________________________________________________    I CERTIFY THE NEED FOR THESE SERVICES FURNISHED UNDER        THIS PLAN OF TREATMENT AND WHILE UNDER MY CARE     (Physician co-signature of this document indicates review and certification of the therapy plan).                Certification date from: (P) 08/24/21, Certification date to: (P) 08/31/21                 Initial Assessment        See Physical Therapy evaluation dated (P) 08/24/21 in Epic electronic health record.

## 2021-08-24 NOTE — PROGRESS NOTES
PT     08/24/21 1000   Living Environment   People in home alone   Transportation Anticipated agency   Living Environment Comments [facility] 9am-9pm   Self-Care   Equipment Currently Used at Home walker, rolling  (FWW)   Activity/Exercise/Self-Care Comment pt unable to provide further history due to cognitive deficits   Disability/Function   Hearing Difficulty or Deaf yes   Patient's preferred means of communication English speaker with hearing loss, no speech problems.   Describe hearing loss bilateral hearing loss   Were auxiliary aids offered? yes   The following aids were provided; pocket talker   Concentrating, Remembering or Making Decisions Difficulty yes   Concentration Management very short attention span, pt repeats the same questions every few minutes   Difficulty Communicating yes   Communication difficulty understanding   Walking or Climbing Stairs Difficulty yes   Walking or Climbing Stairs ambulation difficulty, assistance 1 person;ambulation difficulty, requires equipment   General Information   Onset of Illness/Injury or Date of Surgery 08/23/21   Pain Assessment   Patient Currently in Pain Yes, see Vital Sign flowsheet  (back pain, not rated)   Range of Motion (ROM)   ROM Quick Adds ROM WFL   Strength   Manual Muscle Testing Quick Adds Deficits observed during functional mobility   Bed Mobility   Bed Mobility supine-sit   Supine-Sit [place] (Bed Mobility) moderate assist (50% patient effort)   Bed Mobility Limitations impaired ability to control trunk for mobility;decreased ability to use legs for bridging/pushing;decreased ability to use arms for pushing/pulling   Impairments Contributing to Impaired Bed Mobility impaired balance;pain;decreased strength   Transfers   Transfers bed-chair transfer;sit-stand transfer   Transfer Safety Concerns Noted decreased sequencing ability;decreased balance during turns;decreased step length   Impairments Contributing to Impaired Transfers impaired  balance;pain;decreased strength   Bed-Chair Transfer   Bed-Chair [place] (Transfers) moderate assist (50% patient effort);2 person assist   Assistive Device (Bed-Chair Transfers) walker, front-wheeled   Sit-Stand Transfer   Sit-Stand [place] (Transfers) moderate assist (50% patient effort)   Assistive Device (Sit-Stand Transfers) walker, front-wheeled   Gait/Stairs (Locomotion)   [place] Level (Gait) minimum assist (75% patient effort)   Assistive Device (Gait) walker, front-wheeled   Distance in Feet (Required for LE Total Joints) 1'   Pattern (Gait) step-to   Deviations/Abnormal Patterns (Gait) antalgic   Clinical Impression   Criteria for Skilled Therapeutic Intervention yes, treatment indicated   PT Diagnosis (PT) difficulty walking   Influenced by the following impairments weakness   Functional limitations due to impairments household mobility limitations   Clinical Presentation Stable/Uncomplicated   Clinical Presentation Rationale presents as clinically diagnosed   Clinical Decision Making (Complexity) high complexity   Therapy Frequency (PT) Daily   Predicted Duration of Therapy Intervention (days/wks) 1 week   Planned Therapy Interventions (PT) balance training;bed mobility training;gait training;home exercise program;neuromuscular re-education;patient/family education;transfer training;stair training   Anticipated Equipment Needs at Discharge (PT) gait belt;walker, rolling   PT Discharge Planning    PT Discharge Recommendation (DC Rec) Transitional Care Facility   PT Rationale for DC Rec though pt has help 12 hr/day, she is alone overnight. Usually walks with a walker at home, currently assistx2 to perform pivot transfers   Total Evaluation Time   Total Evaluation Time (Minutes) 10   Paloma Fairchild, PT

## 2021-08-24 NOTE — PLAN OF CARE
Problem: Adult Inpatient Plan of Care  Goal: Plan of Care Review  Outcome: Improving   Explained cares, answered questions, encouraged questions  Problem: Infection  Goal: Absence of Infection Signs and Symptoms  Outcome: Improving   VSS, afebrile  Problem: Confusion Acute  Goal: Optimal Cognitive Function  Outcome: Improving   Alert and oriented but fluctuates with some confusion. Answering questions appropriately, able to make needs known  Problem: UTI (Urinary Tract Infection)  Goal: Improved Infection Symptoms  Outcome: Improving   IV abx started  Problem: Adult Inpatient Plan of Care  Goal: Absence of Hospital-Acquired Illness or Injury  Intervention: Identify and Manage Fall Risk  Recent Flowsheet Documentation  Taken 8/23/2021 2015 by Elina Escamilla RN  Safety Promotion/Fall Prevention:   activity supervised   assistive device/personal items within reach   bed alarm on   chair alarm on   nonskid shoes/slippers when out of bed   room near nurse's station   room door open   supervised activity  Intervention: Prevent Skin Injury  Recent Flowsheet Documentation  Taken 8/23/2021 2100 by Elina Escamilla RN  Body Position:   turned   supine     Problem: Adult Inpatient Plan of Care  Goal: Absence of Hospital-Acquired Illness or Injury  Intervention: Prevent Skin Injury  Recent Flowsheet Documentation  Taken 8/23/2021 2100 by Elina Escamilla RN  Body Position:   turned   supine     Problem: Adult Inpatient Plan of Care  Goal: Readiness for Transition of Care  Intervention: Mutually Develop Transition Plan  Recent Flowsheet Documentation  Taken 8/23/2021 2024 by Elina Escamilla, RN  Equipment Currently Used at Home: walker, standard

## 2021-08-24 NOTE — PROGRESS NOTES
[facility] [place] Internal Medicine Progress Note      ASSESSMENT:    Principal Problem:    Generalized muscle weakness  Active Problems:    Anxiety    Asthma    CHF (congestive heart failure) (H)    Chronic diastolic heart failure (H)    Fibromyalgia    Hypothyroidism    Impaired mobility and ADLs    Irritable bowel syndrome    Peripheral neuropathy    Personal history of TIA (transient ischemic attack)    Hypomagnesemia      PLAN:   89-year-old female with HFpEF, HTN, recurrent urinary tract infections, dementia, hypothyroidism, asthma, IBS and history of prior TIA presents with diffuse weakness and confusion           Weakness and confusion: Concern for possible metabolic encephalopathy, patient with history of multiple UTIs as well as underlying dementia  -- CT head unremarkable  -- normal ammonia  --Replace magnesium  -- TSH, vitamin B12 and folate acceptable  -- Treat for possible UTI.  Based on prior urine cultures and patient's drug allergies will start IV Levaquin.  -- Hold home medical cannabis and Atarax at bedtime       Weakness and deconditioning: plan TCU placement       Hypertension: Continue home hydralazine, losartan        HFpEF  --No evidence of exacerbation, continue home hydralazine and losartan     Paroxysmal atrial fibrillation  --Not on any rate controlling medications or anticoagulation at this time  --Replace magnesium     Asthma  --Continue home inhalers     Hypothyroidism  --Continue home replacement     Fibromyalgia and chronic pain with DJD  --Hold home medical THC  --Avoid opiates, patient has had adverse reactions to this in the past with documentation of no benefit in pain.  --Continue Tylenol and NSAIDs, follow GFR daily while on NSAIDs and losartan        PRABHJOT  --CPAP per home settings     GERD  --Continue home famotidine         Hypomagnesemia: Replace and recheck       DVT PPX: SCD     Code status:  DNRADHA Valencia D.O.                      -------------------------------------------------------------------------------------------------------------  SUBJECTIVE: NAD. Denies any nausea, vomiting, abdominal pain, chest pain, SOB, new swelling, fevers, chills, or headache.     Exam:  BP (!) 146/73   Pulse 87   Temp 99.2  F (37.3  C) (Axillary)   Resp 20   Wt 72.6 kg (160 lb)   SpO2 95%   BMI 29.26 kg/m    General: NAD  RESPIRATORY: Breathing nonlabored  CARDIOVASCULAR: No le edema bilat.   ABDOMEN: soft and non-tender  NEUROLOGIC: Non focal, motor and sensory intact, speech clear  PSYCHIATRIC: Oriented to self only   Diagnostics Reviewed:      Recent Results (from the past 24 hour(s))   ECG 12-LEAD WITH MUSE (LHE)    Collection Time: 08/23/21  4:45 PM   Result Value Ref Range    Systolic Blood Pressure  mmHg    Diastolic Blood Pressure  mmHg    Ventricular Rate 72 BPM    Atrial Rate 72 BPM    ME Interval 158 ms    QRS Duration 74 ms     ms    QTc 389 ms    P Axis 79 degrees    R AXIS -10 degrees    T Axis 75 degrees    Interpretation ECG       Sinus rhythm  Nonspecific T wave abnormality  Abnormal ECG  When compared with ECG of 07-MAY-2021 00:27,  ME interval has decreased  QT has shortened  Confirmed by SEE ED PROVIDER NOTE FOR, ECG INTERPRETATION ([id number]),  JUDI AMAYA ([id number]) on 8/24/2021 7:02:47 AM     Asymptomatic COVID-19 Virus (Coronavirus) by PCR Nasopharyngeal    Collection Time: 08/23/21  5:05 PM    Specimen: Nasopharyngeal; Swab   Result Value Ref Range    SARS CoV2 PCR Negative Negative   Comprehensive metabolic panel    Collection Time: 08/23/21  5:41 PM   Result Value Ref Range    Sodium 135 (L) 136 - 145 mmol/L    Potassium 3.8 3.5 - 5.0 mmol/L    Chloride 99 98 - 107 mmol/L    Carbon Dioxide (CO2) 28 22 - 31 mmol/L    Anion Gap 8 5 - 18 mmol/L    Urea Nitrogen 18 8 - 28 mg/dL    Creatinine 0.92 0.60 - 1.10 mg/dL    Calcium 9.4 8.5 - 10.5 mg/dL    Glucose 139 (H) 70 - 125 mg/dL    Alkaline Phosphatase 60 45  - 120 U/L    AST 14 0 - 40 U/L    ALT 10 0 - 45 U/L    Protein Total 6.6 6.0 - 8.0 g/dL    Albumin 3.8 3.5 - 5.0 g/dL    Bilirubin Total 0.9 0.0 - 1.0 mg/dL    GFR Estimate 54 (L) >60 mL/min/1.73m2   Magnesium    Collection Time: 08/23/21  5:41 PM   Result Value Ref Range    Magnesium 1.6 (L) 1.8 - 2.6 mg/dL   Troponin I    Collection Time: 08/23/21  5:41 PM   Result Value Ref Range    Troponin I 0.08 0.00 - 0.29 ng/mL   Extra Blood Culture Bottle    Collection Time: 08/23/21  5:41 PM   Result Value Ref Range    Hold Specimen JIC    Extra Red Top Tube    Collection Time: 08/23/21  5:41 PM   Result Value Ref Range    Hold Specimen JIC    Extra Green Top (Lithium Heparin) Tube    Collection Time: 08/23/21  5:41 PM   Result Value Ref Range    Hold Specimen JIC    Extra Purple Top Tube    Collection Time: 08/23/21  5:41 PM   Result Value Ref Range    Hold Specimen JIC    CBC with platelets and differential    Collection Time: 08/23/21  5:41 PM   Result Value Ref Range    WBC Count 11.4 (H) 4.0 - 11.0 10e3/uL    RBC Count 3.21 (L) 3.80 - 5.20 10e6/uL    Hemoglobin 10.0 (L) 11.7 - 15.7 g/dL    Hematocrit 30.2 (L) 35.0 - 47.0 %    MCV 94 78 - 100 fL    MCH 31.2 26.5 - 33.0 pg    MCHC 33.1 31.5 - 36.5 g/dL    RDW 12.7 10.0 - 15.0 %    Platelet Count 276 150 - 450 10e3/uL    % Neutrophils 77 %    % Lymphocytes 12 %    % Monocytes 10 %    % Eosinophils 1 %    % Basophils 0 %    % Immature Granulocytes 0 %    NRBCs per 100 WBC 0 <1 /100    Absolute Neutrophils 8.7 (H) 1.6 - 8.3 10e3/uL    Absolute Lymphocytes 1.4 0.8 - 5.3 10e3/uL    Absolute Monocytes 1.2 0.0 - 1.3 10e3/uL    Absolute Eosinophils 0.1 0.0 - 0.7 10e3/uL    Absolute Basophils 0.0 0.0 - 0.2 10e3/uL    Absolute Immature Granulocytes 0.1 (H) <=0.0 10e3/uL    Absolute NRBCs 0.0 10e3/uL   TSH    Collection Time: 08/23/21  5:41 PM   Result Value Ref Range    TSH 1.32 0.30 - 5.00 uIU/mL   Vitamin B12    Collection Time: 08/23/21  5:41 PM   Result Value Ref Range     Vitamin B12 575 213 - 816 pg/mL   Folate    Collection Time: 08/23/21  5:41 PM   Result Value Ref Range    Folic Acid 16.3 >=3.5 ng/mL   UA with Microscopic reflex to Culture    Collection Time: 08/23/21  7:03 PM    Specimen: Urine, Clean Catch   Result Value Ref Range    Color Urine Yellow Colorless, Straw, Light Yellow, Yellow    Appearance Urine Turbid (A) Clear    Glucose Urine Negative Negative mg/dL    Bilirubin Urine Negative Negative    Ketones Urine Negative Negative mg/dL    Specific Gravity Urine 1.019 1.001 - 1.030    Blood Urine 1.0 mg/dL (A) Negative    pH Urine 6.0 5.0 - 7.0    Protein Albumin Urine 200  (A) Negative mg/dL    Urobilinogen Urine <2.0 <2.0 mg/dL    Nitrite Urine Negative Negative    Leukocyte Esterase Urine 500 [place]/uL (A) Negative    RBC Urine 104 (H) <=2 /HPF    WBC Urine 97 (H) <=5 /HPF    Squamous Epithelials Urine 1 <=1 /HPF    Transitional Epithelials Urine <1 <=1 /HPF   CBC with platelets    Collection Time: 08/24/21  5:38 AM   Result Value Ref Range    WBC Count 9.4 4.0 - 11.0 10e3/uL    RBC Count 3.81 3.80 - 5.20 10e6/uL    Hemoglobin 11.7 11.7 - 15.7 g/dL    Hematocrit 35.3 35.0 - 47.0 %    MCV 93 78 - 100 fL    MCH 30.7 26.5 - 33.0 pg    MCHC 33.1 31.5 - 36.5 g/dL    RDW 12.8 10.0 - 15.0 %    Platelet Count 215 150 - 450 10e3/uL   Magnesium    Collection Time: 08/24/21  5:38 AM   Result Value Ref Range    Magnesium 1.6 (L) 1.8 - 2.6 mg/dL

## 2021-08-24 NOTE — PLAN OF CARE
Problem: Confusion Acute  Goal: Optimal Cognitive Function  8/24/2021 0253 by Elina Escamilla RN  Outcome: Improving  8/23/2021 2242 by Elina Escamilla RN  Outcome: Improving   fluctuates  Problem: UTI (Urinary Tract Infection)  Goal: Improved Infection Symptoms  Outcome: Improving   Encourage fluids, low urine output  Problem: Infection  Goal: Absence of Infection Signs and Symptoms  Outcome: Improving   Afebrile, VSS  Problem: Adult Inpatient Plan of Care  Goal: Absence of Hospital-Acquired Illness or Injury  Intervention: Identify and Manage Fall Risk  Recent Flowsheet Documentation  Taken 8/23/2021 2305 by Elina Escamilla RN  Safety Promotion/Fall Prevention:   activity supervised   fall prevention program maintained   bed alarm on   chair alarm on   patient and family education   room door open   room near nurse's station   nonskid shoes/slippers when out of bed   supervised activity  Taken 8/23/2021 2015 by Elina Escamilla RN  Safety Promotion/Fall Prevention:   activity supervised   assistive device/personal items within reach   bed alarm on   chair alarm on   nonskid shoes/slippers when out of bed   room near nurse's station   room door open   supervised activity  Intervention: Prevent Skin Injury  Recent Flowsheet Documentation  Taken 8/23/2021 2305 by Elina Escamilla RN  Body Position:   turned   supine  Taken 8/23/2021 2100 by Elina Escamilla RN  Body Position:   turned   supine     Problem: Adult Inpatient Plan of Care  Goal: Absence of Hospital-Acquired Illness or Injury  Intervention: Prevent Skin Injury  Recent Flowsheet Documentation  Taken 8/23/2021 2305 by Elina Escamilla RN  Body Position:   turned   supine  Taken 8/23/2021 2100 by Elina Escamilla RN  Body Position:   turned   supine     Problem: Adult Inpatient Plan of Care  Goal: Readiness for Transition of Care  Intervention: Mutually Develop Transition Plan  Recent Flowsheet Documentation  Taken 8/23/2021 2024 by Elina Escamilla  RN  Equipment Currently Used at Home: walker, standard

## 2021-08-24 NOTE — PLAN OF CARE
Problem: Adult Inpatient Plan of Care  Goal: Plan of Care Review  Outcome: Improving     Problem: Adult Inpatient Plan of Care  Goal: Optimal Comfort and Wellbeing  8/24/2021 0515 by Elina Escamilla RN  Outcome: Improving  8/24/2021 0253 by Elina Escamilla RN  Outcome: Improving   Pt slept well all night  Problem: Infection  Goal: Absence of Infection Signs and Symptoms  Outcome: Improving     Problem: Confusion Acute  Goal: Optimal Cognitive Function  8/24/2021 0515 by Elina Escamilla RN  Outcome: Improving  8/24/2021 0253 by Elina Escamilla RN  Outcome: Improving  8/23/2021 2242 by Elina Escamilla RN  Outcome: Improving     Problem: UTI (Urinary Tract Infection)  Goal: Improved Infection Symptoms  8/24/2021 0515 by Elina Escamilla RN  Outcome: Improving  8/23/2021 2242 by Elina Escamilla RN  Outcome: Improving     Problem: Adult Inpatient Plan of Care  Goal: Absence of Hospital-Acquired Illness or Injury  Intervention: Identify and Manage Fall Risk  Recent Flowsheet Documentation  Taken 8/23/2021 2305 by Elina Escamilla RN  Safety Promotion/Fall Prevention:   activity supervised   fall prevention program maintained   bed alarm on   chair alarm on   patient and family education   room door open   room near nurse's station   nonskid shoes/slippers when out of bed   supervised activity  Taken 8/23/2021 2015 by Elina sEcamilla RN  Safety Promotion/Fall Prevention:   activity supervised   assistive device/personal items within reach   bed alarm on   chair alarm on   nonskid shoes/slippers when out of bed   room near nurse's station   room door open   supervised activity  Intervention: Prevent Skin Injury  Recent Flowsheet Documentation  Taken 8/23/2021 2305 by Elina Escamilla RN  Body Position:   turned   supine  Taken 8/23/2021 2100 by Elina Escamilla RN  Body Position:   turned   supine     Problem: Adult Inpatient Plan of Care  Goal: Absence of Hospital-Acquired Illness or Injury  Intervention: Prevent  Skin Injury  Recent Flowsheet Documentation  Taken 8/23/2021 2305 by Elina Escamilla, RN  Body Position:   turned   supine  Taken 8/23/2021 2100 by Elina Escamilla RN  Body Position:   turned   supine     Problem: Adult Inpatient Plan of Care  Goal: Readiness for Transition of Care  Intervention: Mutually Develop Transition Plan  Recent Flowsheet Documentation  Taken 8/23/2021 2024 by Elina Escamilla, RN  Equipment Currently Used at Home: walker, standard

## 2021-08-24 NOTE — PROGRESS NOTES
Care Management Follow Up    Length of Stay (days): 0    Expected Discharge Date:  8/25/21     Concerns to be Addressed: diagnostic work up in process for weakness and confusion        Patient plan of care discussed at interdisciplinary rounds: Yes    Anticipated Discharge Disposition:  home     Anticipated Discharge Services: 24 hour care   Anticipated Discharge DME:  No new DME    Patient/family educated on Medicare website which has current facility and service quality ratings:    Education Provided on the Discharge Plan:  yes  Patient/Family in Agreement with the Plan: yes     Referrals Placed by CM/SW:    Private pay costs discussed:      Additional Information: Spoke to step son Jamie and [facility] care giver Aliya.  Patient lives in her apartment with daily help 9-9:30pm. She has been on her own at night. Jamie states patient would more than likely decline TCU. He is anticipating her returning home and planning to increase caregiver hours to 24 hrs daily. He will fax her Health Care Directive to care management office. Family willing to transport.    Planning for return home with 24 hour care.         Marquita Hall RN

## 2021-08-24 NOTE — PLAN OF CARE
Problem: Nausea and Vomiting  Goal: Fluid and Electrolyte Balance  Outcome: Improving   Pt had a small emesis this morning. Prn Zofran was given with relief.

## 2021-08-24 NOTE — CONSULTS
Care Management Initial Consult    General Information  Assessment completed with: Patient, Care Team Member,  (patient, care giver, family)  Type of CM/SW Visit: Initial Assessment    Primary Care Provider verified and updated as needed: Yes   Readmission within the last 30 days:        Reason for Consult: discharge planning  Advance Care Planning:            Communication Assessment  Patient's communication style: spoken language (English or Bilingual)             Cognitive  Cognitive/Neuro/Behavioral: .WDL except  Level of Consciousness: confused                   Living Environment:   People in home:       Current living Arrangements: house      Able to return to prior arrangements: yes       Family/Social Support:  Care provided by: homecare agency  Provides care for: no one                Description of Support System:           Current Resources:   Patient receiving home care services: Yes  Skilled Home Care Services: Home Health Aid  Community Resources: Home Care, Transportation Services  Equipment currently used at home: walker, rolling, wheelchair, manual  Supplies currently used at home: Incontinence Supplies    Employment/Financial:  Employment Status:          Financial Concerns:             Lifestyle & Psychosocial Needs:  Social Determinants of Health     Tobacco Use: Low Risk      Smoking Tobacco Use: Never Smoker     Smokeless Tobacco Use: Never Used   Alcohol Use:      Frequency of Alcohol Consumption:      Average Number of Drinks:      Frequency of Binge Drinking:    Financial Resource Strain:      Difficulty of Paying Living Expenses:    Food Insecurity:      Worried About Running Out of Food in the Last Year:      Ran Out of Food in the Last Year:    Transportation Needs:      Lack of Transportation (Medical):      Lack of Transportation (Non-Medical):    Physical Activity:      Days of Exercise per Week:      Minutes of Exercise per Session:    Stress:      Feeling of Stress :    Social  Connections:      Frequency of Communication with Friends and Family:      Frequency of Social Gatherings with Friends and Family:      Attends Rastafari Services:      Active Member of Clubs or Organizations:      Attends Club or Organization Meetings:      Marital Status:    Intimate Partner Violence:      Fear of Current or Ex-Partner:      Emotionally Abused:      Physically Abused:      Sexually Abused:    Depression:      PHQ-2 Score:    Housing Stability:      Unable to Pay for Housing in the Last Year:      Number of Places Lived in the Last Year:      Unstable Housing in the Last Year:        Functional Status:  Prior to admission patient needed assistance:   Dependent ADLs:: Ambulation-walker, Wheelchair-with assist  Dependent IADLs:: Cooking, Laundry, Shopping, Meal Preparation, Medication Management, Transportation  Assesssment of Functional Status: Not at baseline with ADL Functioning    Mental Health Status:          Chemical Dependency Status:                Values/Beliefs:  Spiritual, Cultural Beliefs, Rastafari Practices, Values that affect care:                 Additional Information:  Writer spoke with patient's care giver (HHA with [facility]) Aliya: [phone]. Patient gets care from 9- 9:30 pm seven days a week. She uses a walker, gets assist with dressing, bathing, meds, meals, housekeeping, shopping. Patient has HCD and per care giver is DNR/DNI. Today came to ED confused, unsteady. Has UTI. Patient's son is Isael: [phone]; message left with him. Aliya said he likes that she can help with information. She has been patient's care giver for the lat 7 years.     STEWART/Observations status reviewed. Paper work given.     Anticipate discharge with additional home care services through [facility]: nurse, PT, OT. Fam to transport. Informed CM to follow.     Kim Ayala, IVAN, CM, RYAN

## 2021-08-24 NOTE — PROGRESS NOTES
08/24/21 1045   Quick Adds   Quick Adds Certification   Type of Visit Initial Occupational Therapy Evaluation   Living Environment   Living Environment Comments see PT   Self-Care   Activity/Exercise/Self-Care Comment see PT   Instrumental Activities of Daily Living (IADL)   Previous Responsibilities   (has pc ASSIST FOR ADL'S AND TRANSFERS 7 DAYS A WEEK )   Disability/Function   Hearing Difficulty or Deaf yes   Communication difficulty understanding   General Information   Onset of Illness/Injury or Date of Surgery 08/23/21   Referring Physician efra reyes   Patient/Family Therapy Goal Statement (OT) wants to retun home   Limitations/Impairments hearing   Cognitive Status Examination   Orientation Status person   Follows Commands delayed response/completion;increased processing time needed   Safety Deficit severe deficit   Memory Deficit severe deficit;short-term memory   Attention Deficit severe deficit   Posture   Posture forward head position   Range of Motion Comprehensive   General Range of Motion no range of motion deficits identified   Comment, General Range of Motion very slow to move UE's   Strength Comprehensive (MMT)   Comment, General Manual Muscle Testing (MMT) Assessment pt has had care for ADL'S and does not try /or understand when asked to complete simple adl's like washing face/hands/says what do I do/or you do it for me   Bed Mobility   Bed Mobility rolling left;rolling right;supine-sit;sit-supine   Rolling Left [place] (Bed Mobility) maximum assist (25% patient effort)   Rolling Right [place] (Bed Mobility) maximum assist (25% patient effort)   Sit-Supine [place] (Bed Mobility) maximum assist (25% patient effort);2 person assist   Transfers   Transfers bed-chair transfer;sit-stand transfer   Transfer Skill: Bed to Chair/Chair to Bed   Bed-Chair [place] (Transfers) moderate assist (50% patient effort);2 person assist   Sit-Stand Transfer   Sit-Stand [place]  (Transfers) moderate assist (50% patient effort);2 person assist   Lower Body Dressing Assessment   [place] Level (Lower Body Dressing) dependent (less than 25% patient effort)   Comment (Lower Body Dressing) sitting   Grooming Assessment   [place] Level (Grooming) moderate assist (50% patient effort)   Comment (Grooming) wash face/hands/comb hair   Eating/Self Feeding   [place] Level (Feeding) maximum assist (25% patient effort)   Position (Self-Feeding) supported sitting   Comment (Feeding) had assist to feed self/drink liquid   Clinical Impression   Criteria for Skilled Therapeutic Interventions Met (OT) no   ADL comments/analysis pt has PCA care daily 7 days a week feom 9 am to 9:30 pm/has a w/c   Therapy Frequency (OT) 1x eval and treat   OT Discharge Planning    OT Discharge Recommendation (DC Rec) Home with assist   OT Rationale for DC Rec recommend return home with 7 day a week PCA care/needs 24/7 care   Therapy Certification   Start of Care Date 08/24/21   Certification date from 08/24/21   Certification date to 08/31/21   Medical Diagnosis generalized muscle weakness   Total Evaluation Time (Minutes)   Total Evaluation Time (Minutes) 15        08/24/21 1045   Quick Adds   Quick Adds Certification   Type of Visit Initial Occupational Therapy Evaluation   Living Environment   Living Environment Comments see PT   Self-Care   Activity/Exercise/Self-Care Comment see PT   Instrumental Activities of Daily Living (IADL)   Previous Responsibilities   (has pc ASSIST FOR ADL'S AND TRANSFERS 7 DAYS A WEEK )   Disability/Function   Hearing Difficulty or Deaf yes   Communication difficulty understanding   General Information   Onset of Illness/Injury or Date of Surgery 08/23/21   Referring Physician efra reyes   Patient/Family Therapy Goal Statement (OT) wants to retun home   Limitations/Impairments hearing   Cognitive Status Examination   Orientation Status person   Follows Commands delayed  response/completion;increased processing time needed   Safety Deficit severe deficit   Memory Deficit severe deficit;short-term memory   Attention Deficit severe deficit   Posture   Posture forward head position   Range of Motion Comprehensive   General Range of Motion no range of motion deficits identified   Comment, General Range of Motion very slow to move UE's   Strength Comprehensive (MMT)   Comment, General Manual Muscle Testing (MMT) Assessment pt has had care for ADL'S and does not try /or understand when asked to complete simple adl's like washing face/hands/says what do I do/or you do it for me   Bed Mobility   Bed Mobility rolling left;rolling right;supine-sit;sit-supine   Rolling Left [place] (Bed Mobility) maximum assist (25% patient effort)   Rolling Right [place] (Bed Mobility) maximum assist (25% patient effort)   Sit-Supine [place] (Bed Mobility) maximum assist (25% patient effort);2 person assist   Transfers   Transfers bed-chair transfer;sit-stand transfer   Transfer Skill: Bed to Chair/Chair to Bed   Bed-Chair [place] (Transfers) moderate assist (50% patient effort);2 person assist   Sit-Stand Transfer   Sit-Stand [place] (Transfers) moderate assist (50% patient effort);2 person assist   Lower Body Dressing Assessment   [place] Level (Lower Body Dressing) dependent (less than 25% patient effort)   Comment (Lower Body Dressing) sitting   Grooming Assessment   [place] Level (Grooming) moderate assist (50% patient effort)   Comment (Grooming) wash face/hands/comb hair   Eating/Self Feeding   [place] Level (Feeding) maximum assist (25% patient effort)   Position (Self-Feeding) supported sitting   Comment (Feeding) had assist to feed self/drink liquid   Clinical Impression   Criteria for Skilled Therapeutic Interventions Met (OT) no   ADL comments/analysis pt has PCA care daily 7 days a week feom 9 am to 9:30 pm/has a w/c   Therapy Frequency (OT) 1x eval and  treat   OT Discharge Planning    OT Discharge Recommendation (DC Rec) Home with assist   OT Rationale for DC Rec recommend return home with 7 day a week PCA care/needs 24/7 care   Therapy Certification   Start of Care Date 08/24/21   Certification date from 08/24/21   Certification date to 08/31/21   Medical Diagnosis generalized muscle weakness   Total Evaluation Time (Minutes)   Total Evaluation Time (Minutes) 15

## 2021-08-25 ENCOUNTER — APPOINTMENT (OUTPATIENT)
Dept: PHYSICAL THERAPY | Facility: HOSPITAL | Age: 86
End: 2021-08-25
Payer: MEDICARE

## 2021-08-25 LAB
ANION GAP SERPL CALCULATED.3IONS-SCNC: 10 MMOL/L (ref 5–18)
BACTERIA UR CULT: ABNORMAL
BUN SERPL-MCNC: 17 MG/DL (ref 8–28)
CALCIUM SERPL-MCNC: 8.9 MG/DL (ref 8.5–10.5)
CHLORIDE BLD-SCNC: 98 MMOL/L (ref 98–107)
CO2 SERPL-SCNC: 24 MMOL/L (ref 22–31)
CREAT SERPL-MCNC: 0.7 MG/DL (ref 0.6–1.1)
GFR SERPL CREATININE-BSD FRML MDRD: 75 ML/MIN/1.73M2
GLUCOSE BLD-MCNC: 108 MG/DL (ref 70–125)
MAGNESIUM SERPL-MCNC: 1.6 MG/DL (ref 1.8–2.6)
POTASSIUM BLD-SCNC: 3.4 MMOL/L (ref 3.5–5)
SODIUM SERPL-SCNC: 132 MMOL/L (ref 136–145)

## 2021-08-25 PROCEDURE — 97535 SELF CARE MNGMENT TRAINING: CPT | Mod: GP,59

## 2021-08-25 PROCEDURE — G0378 HOSPITAL OBSERVATION PER HR: HCPCS

## 2021-08-25 PROCEDURE — 83735 ASSAY OF MAGNESIUM: CPT | Performed by: INTERNAL MEDICINE

## 2021-08-25 PROCEDURE — 36415 COLL VENOUS BLD VENIPUNCTURE: CPT | Performed by: INTERNAL MEDICINE

## 2021-08-25 PROCEDURE — 97110 THERAPEUTIC EXERCISES: CPT | Mod: GP

## 2021-08-25 PROCEDURE — 80048 BASIC METABOLIC PNL TOTAL CA: CPT | Performed by: INTERNAL MEDICINE

## 2021-08-25 PROCEDURE — 97530 THERAPEUTIC ACTIVITIES: CPT | Mod: GP

## 2021-08-25 PROCEDURE — 250N000011 HC RX IP 250 OP 636: Performed by: INTERNAL MEDICINE

## 2021-08-25 PROCEDURE — 96374 THER/PROPH/DIAG INJ IV PUSH: CPT

## 2021-08-25 PROCEDURE — 99225 PR SUBSEQUENT OBSERVATION CARE,LEVEL II: CPT | Performed by: INTERNAL MEDICINE

## 2021-08-25 PROCEDURE — 96375 TX/PRO/DX INJ NEW DRUG ADDON: CPT

## 2021-08-25 PROCEDURE — 99207 PR CDG-CODE CATEGORY CHANGED: CPT | Performed by: INTERNAL MEDICINE

## 2021-08-25 PROCEDURE — 97116 GAIT TRAINING THERAPY: CPT | Mod: GP

## 2021-08-25 PROCEDURE — 250N000013 HC RX MED GY IP 250 OP 250 PS 637: Performed by: INTERNAL MEDICINE

## 2021-08-25 RX ORDER — LEVOFLOXACIN 250 MG/1
250 TABLET, FILM COATED ORAL DAILY
Status: DISCONTINUED | OUTPATIENT
Start: 2021-08-25 | End: 2021-08-26 | Stop reason: HOSPADM

## 2021-08-25 RX ORDER — POTASSIUM CHLORIDE 1500 MG/1
20 TABLET, EXTENDED RELEASE ORAL 2 TIMES DAILY
Status: COMPLETED | OUTPATIENT
Start: 2021-08-25 | End: 2021-08-25

## 2021-08-25 RX ORDER — POTASSIUM CHLORIDE 1500 MG/1
20 TABLET, EXTENDED RELEASE ORAL 2 TIMES DAILY
Status: DISCONTINUED | OUTPATIENT
Start: 2021-08-25 | End: 2021-08-25

## 2021-08-25 RX ADMIN — QUETIAPINE FUMARATE 12.5 MG: 25 TABLET ORAL at 21:01

## 2021-08-25 RX ADMIN — HYDRALAZINE HYDROCHLORIDE 12.5 MG: 25 TABLET, FILM COATED ORAL at 10:24

## 2021-08-25 RX ADMIN — LEVOTHYROXINE SODIUM 50 MCG: 0.03 TABLET ORAL at 06:43

## 2021-08-25 RX ADMIN — SODIUM CHLORIDE TAB 1 GM 1.5 G: 1 TAB at 17:35

## 2021-08-25 RX ADMIN — FAMOTIDINE 20 MG: 20 TABLET, FILM COATED ORAL at 10:12

## 2021-08-25 RX ADMIN — ASPIRIN 81 MG: 81 TABLET, COATED ORAL at 13:28

## 2021-08-25 RX ADMIN — MAGNESIUM 64 MG (MAGNESIUM CHLORIDE) TABLET,DELAYED RELEASE 535 MG: at 10:22

## 2021-08-25 RX ADMIN — POLYETHYLENE GLYCOL 3350 17 G: 17 POWDER, FOR SOLUTION ORAL at 17:38

## 2021-08-25 RX ADMIN — Medication 2000 UNITS: at 10:07

## 2021-08-25 RX ADMIN — LOSARTAN POTASSIUM 100 MG: 25 TABLET, FILM COATED ORAL at 13:28

## 2021-08-25 RX ADMIN — ONDANSETRON 4 MG: 4 TABLET, ORALLY DISINTEGRATING ORAL at 20:23

## 2021-08-25 RX ADMIN — ACETAMINOPHEN 975 MG: 325 TABLET ORAL at 10:08

## 2021-08-25 RX ADMIN — ACETAMINOPHEN 975 MG: 325 TABLET ORAL at 21:05

## 2021-08-25 RX ADMIN — HYDRALAZINE HYDROCHLORIDE 12.5 MG: 25 TABLET, FILM COATED ORAL at 21:01

## 2021-08-25 RX ADMIN — IBUPROFEN 600 MG: 600 TABLET, FILM COATED ORAL at 10:13

## 2021-08-25 RX ADMIN — POTASSIUM CHLORIDE 20 MEQ: 20 TABLET, EXTENDED RELEASE ORAL at 10:12

## 2021-08-25 RX ADMIN — POTASSIUM CHLORIDE 20 MEQ: 20 TABLET, EXTENDED RELEASE ORAL at 21:00

## 2021-08-25 RX ADMIN — LEVOFLOXACIN 250 MG: 250 TABLET, FILM COATED ORAL at 17:34

## 2021-08-25 RX ADMIN — DULOXETINE HYDROCHLORIDE 60 MG: 60 CAPSULE, DELAYED RELEASE ORAL at 21:04

## 2021-08-25 RX ADMIN — IBUPROFEN 400 MG: 200 TABLET, FILM COATED ORAL at 21:03

## 2021-08-25 RX ADMIN — SODIUM CHLORIDE TAB 1 GM 1.5 G: 1 TAB at 10:22

## 2021-08-25 RX ADMIN — MELATONIN TAB 3 MG 3 MG: 3 TAB at 21:02

## 2021-08-25 NOTE — PROGRESS NOTES
[facility] [place] Note      ASSESSMENT:    Principal Problem:    Generalized muscle weakness  Active Problems:    Anxiety    Asthma    CHF (congestive heart failure) (H)    Chronic diastolic heart failure (H)    Fibromyalgia    Hypothyroidism    Impaired mobility and ADLs    Irritable bowel syndrome    Peripheral neuropathy    Personal history of TIA (transient ischemic attack)    Hypomagnesemia      PLAN:   89-year-old female with HFpEF, HTN, recurrent urinary tract infections, dementia, hypothyroidism, asthma, IBS and history of prior TIA presents with diffuse weakness and confusion, found to have recurrent UTI.           Weakness and confusion: Likely related to underlying UTI and medical cannabis use  -- CT head unremarkable  -- normal ammonia  --Replace magnesium and potassium.    -- TSH, vitamin B12 and folate acceptable  -- Treat for Klebsiella UTI.  Based on prior urine cultures and patient's drug allergies patient was started on IV Levaquin.  Urine culture returned with Klebsiella sensitive to quinolones.  We will plan discharge home with course of oral Levaquin.  -- Hold home medical cannabis and Atarax at bedtime       Weakness and deconditioning: plan discharge home with 24-hour home care once this can be set up 8/26/2021.  I have updated the patient's stepson and he is able to assist with patient at home 8/26/2021.      GI upset: Possibly related to potassium, magnesium and salt tabs replacement started 8/25/2021.  --Try MiraLAX since patient has not had bowel movement for 2 days  --Consider stopping or reducing dose of salt tabs pending trend of sodium in the morning      Hypokalemia/hypomagnesemia:  --Started on magnesium and potassium replacement, recheck in the a.m.    Hyponatremia: Mild, started salt tabs 8/25/2021, trend sodium for improvement in the a.m.       Hypertension: Continue home hydralazine, losartan        HFpEF  --No evidence of exacerbation, continue home  hydralazine and losartan     Paroxysmal atrial fibrillation  --Not on any rate controlling medications or anticoagulation at this time  --Replace magnesium     Asthma  --Continue home inhalers     Hypothyroidism  --Continue home replacement     Fibromyalgia and chronic pain with DJD  --Hold home medical THC  --Avoid opiates, patient has had adverse reactions to this in the past with documentation of no benefit in pain.  --Continue Tylenol and NSAIDs, follow GFR daily while on NSAIDs and losartan        PRABHJOT  --CPAP per home settings     GERD  --Continue home famotidine           DVT PPX: SCD     Code status:  DNRADHA Valencia D.O.                     -------------------------------------------------------------------------------------------------------------  SUBJECTIVE: NAD. Denies any nausea, vomiting, abdominal pain, chest pain, SOB, new swelling, fevers, chills, or headache.     Exam:  /63 (BP Location: Left arm)   Pulse 63   Temp 98.5  F (36.9  C) (Oral)   Resp 18   Wt 72.6 kg (160 lb)   SpO2 97%   BMI 29.26 kg/m    General: NAD  RESPIRATORY: Breathing nonlabored  CARDIOVASCULAR: No le edema bilat.   ABDOMEN: soft and non-tender  NEUROLOGIC: Non focal, motor and sensory intact, speech clear  PSYCHIATRIC: Oriented to self only   Diagnostics Reviewed:      Recent Results (from the past 24 hour(s))   Basic metabolic panel    Collection Time: 08/25/21  5:43 AM   Result Value Ref Range    Sodium 132 (L) 136 - 145 mmol/L    Potassium 3.4 (L) 3.5 - 5.0 mmol/L    Chloride 98 98 - 107 mmol/L    Carbon Dioxide (CO2) 24 22 - 31 mmol/L    Anion Gap 10 5 - 18 mmol/L    Urea Nitrogen 17 8 - 28 mg/dL    Creatinine 0.70 0.60 - 1.10 mg/dL    Calcium 8.9 8.5 - 10.5 mg/dL    Glucose 108 70 - 125 mg/dL    GFR Estimate 75 >60 mL/min/1.73m2   Magnesium    Collection Time: 08/25/21  5:43 AM   Result Value Ref Range    Magnesium 1.6 (L) 1.8 - 2.6 mg/dL

## 2021-08-25 NOTE — PLAN OF CARE
"PRIMARY DIAGNOSIS: \"GENERIC\" NURSING  OUTPATIENT/OBSERVATION GOALS TO BE MET BEFORE DISCHARGE:  ADLs back to baseline: No    Activity and level of assistance: Up with maximum assistance. Consider SW and/or PT evaluation.     Pain status: Pain free.    Return to near baseline physical activity: No     Discharge Planner Nurse   Safe discharge environment identified: Yes  Barriers to discharge: Yes       Entered by: Roni Rodriguez 08/25/2021 12:37 AM     Please review provider order for any additional goals.   Nurse to notify provider when observation goals have been met and patient is ready for discharge.  "

## 2021-08-25 NOTE — UTILIZATION REVIEW
Concurrent stay review; Secondary Review Determination     Under the authority of the Utilization Management Committee, the utilization review process indicated a secondary review on Ani Porter.  The review outcome is based on review of the medical records, discussions with staff, and applying clinical experience noted on the date of the review.        (x) Observation Status Appropriate - Concurrent stay review    RATIONALE FOR DETERMINATION   89-year-old female admitted with weakness and confusion.  History of multiple UTIs and started on antibiotics Klebsiella UTI.  Currently on oral ciprofloxacin.  Not currently on IV medications.  Multiple chronic medical issues appear stable.  Awaiting disposition.    Patient is clinically improving and there is no clear indication to change patient's status to inpatient. The severity of illness, intensity of service provided, expected LOS and risk for adverse outcome make the care appropriate for observation.    The information on this document is developed by the utilization review team in order for the business office to ensure compliance.  This only denotes the appropriateness of proper admission status and does not reflect the quality of care rendered.         The definitions of Inpatient Status and Observation Status used in making the determination above are those provided in the CMS Coverage Manual, Chapter 1 and Chapter 6, section 70.4.      Sincerely,   Paul Hernandez MD  Utilization Review  Physician Advisor  [facility]

## 2021-08-25 NOTE — PLAN OF CARE
PRIMARY DIAGNOSIS: GENERALIZED WEAKNESS    OUTPATIENT/OBSERVATION GOALS TO BE MET BEFORE DISCHARGE  1. Orthostatic performed: No    2. Tolerating PO medications: Yes    3. Return to near baseline physical activity: No    4. Cleared for discharge by consultants (if involved): No    Discharge Planner Nurse   Safe discharge environment identified: Yes  Barriers to discharge: Yes       Entered by: Roni Rodriguez 08/24/2021 9:48 PM     Please review provider order for any additional goals.   Nurse to notify provider when observation goals have been met and patient is ready for discharge.

## 2021-08-25 NOTE — PLAN OF CARE
"PRIMARY DIAGNOSIS: \"GENERIC\" NURSING  OUTPATIENT/OBSERVATION GOALS TO BE MET BEFORE DISCHARGE:  ADLs back to baseline: No    Activity and level of assistance: Up with maximum assistance. Consider SW and/or PT evaluation.     Pain status: Pain free.    Return to near baseline physical activity: No     Discharge Planner Nurse   Safe discharge environment identified: Yes  Barriers to discharge: Yes       Entered by: Roni Rodriguez 08/25/2021 4:56 AM       Please review provider order for any additional goals.   Nurse to notify provider when observation goals have been met and patient is ready for discharge.    Patient continues to be confused. Administered PRN melatonin and zyprexa for some agitation. Blood pressure elevated.     Roni Rodriguez RN    "

## 2021-08-25 NOTE — PROGRESS NOTES
Care Management Follow Up    Length of Stay (days): 0    Expected Discharge Date:  8/26/21     Concerns to be Addressed: UTI, cultures pending      Patient plan of care discussed at interdisciplinary rounds:  yes    Anticipated Discharge Disposition:  home     Anticipated Discharge Services: private pay   Anticipated Discharge DME: none     Patient/family educated on Medicare website which has current facility and service quality ratings:    Education Provided on the Discharge Plan:  yes  Patient/Family in Agreement with the Plan:  yes    Referrals Placed by CM/SW: none   Private pay costs discussed: n/a    Additional Information:       Assessment History: Patient lives in her apartment with daily help 9-9:30pm from [facility].  Patient and family is are planning return home and planning to increase caregiver hours to 24 hrs daily. Jamie-step son to fax her Health Care Directive to care management office. Jamie is main contact. Family willing to transport.    [facility] contact Ilda [phone].     Spoke with Jamie to review discharge plan. Planning for return home with 24 hour care with either [facility] and/or family. Jamie states family can support family if [facility] is unable to staff. Notified Ilda of anticipated discharge tomorrow.  At this time, Jamie wants to wait on option to add skilled home care until time of discharge. CM to follow up 8/26 on if skilled need and discuss with Jamie.        Marquita Hall, RN

## 2021-08-25 NOTE — PLAN OF CARE
Problem: UTI (Urinary Tract Infection)  Goal: Improved Infection Symptoms  8/24/2021 1301 by Thalia Mueller, RN  Outcome: Improving     Problem: Nausea and Vomiting  Goal: Fluid and Electrolyte Balance  8/24/2021 1301 by Thalia Mueller RN  Outcome: Improving   No nausea/vomiting noted. Pt tolerated, oral intake. Ate less than 25% for dinner and kept it down.

## 2021-08-25 NOTE — PLAN OF CARE
PRIMARY DIAGNOSIS: GENERALIZED WEAKNESS    OUTPATIENT/OBSERVATION GOALS TO BE MET BEFORE DISCHARGE  1. Orthostatic performed: N/A    2. Tolerating PO medications:  nausea after taking potassium and sodium tablets, premedicate with zofran.    3. Return to near baseline physical activity: Yes    4. Cleared for discharge by consultants (if involved): No    Discharge Planner Nurse   Safe discharge environment identified: Yes  Barriers to discharge: No       Entered by: Gia Henley 08/25/2021 6:50 PM   Patient reports excessive sleepiness but caregiver states this is patients normal pattern.  Please review provider order for any additional goals.   Nurse to notify provider when observation goals have been met and patient is ready for discharge.

## 2021-08-26 ENCOUNTER — APPOINTMENT (OUTPATIENT)
Dept: PHYSICAL THERAPY | Facility: HOSPITAL | Age: 86
End: 2021-08-26
Payer: MEDICARE

## 2021-08-26 VITALS
HEART RATE: 70 BPM | BODY MASS INDEX: 29.58 KG/M2 | OXYGEN SATURATION: 97 % | SYSTOLIC BLOOD PRESSURE: 148 MMHG | DIASTOLIC BLOOD PRESSURE: 77 MMHG | WEIGHT: 161.7 LBS | TEMPERATURE: 98.7 F | RESPIRATION RATE: 16 BRPM

## 2021-08-26 LAB
ANION GAP SERPL CALCULATED.3IONS-SCNC: 8 MMOL/L (ref 5–18)
BUN SERPL-MCNC: 13 MG/DL (ref 8–28)
CALCIUM SERPL-MCNC: 9.1 MG/DL (ref 8.5–10.5)
CHLORIDE BLD-SCNC: 99 MMOL/L (ref 98–107)
CO2 SERPL-SCNC: 25 MMOL/L (ref 22–31)
CREAT SERPL-MCNC: 0.7 MG/DL (ref 0.6–1.1)
GFR SERPL CREATININE-BSD FRML MDRD: 75 ML/MIN/1.73M2
GLUCOSE BLD-MCNC: 92 MG/DL (ref 70–125)
HOLD SPECIMEN: NORMAL
MAGNESIUM SERPL-MCNC: 1.6 MG/DL (ref 1.8–2.6)
POTASSIUM BLD-SCNC: 3.8 MMOL/L (ref 3.5–5)
SODIUM SERPL-SCNC: 132 MMOL/L (ref 136–145)

## 2021-08-26 PROCEDURE — 36415 COLL VENOUS BLD VENIPUNCTURE: CPT | Performed by: INTERNAL MEDICINE

## 2021-08-26 PROCEDURE — 99217 PR OBSERVATION CARE DISCHARGE: CPT | Performed by: INTERNAL MEDICINE

## 2021-08-26 PROCEDURE — 97530 THERAPEUTIC ACTIVITIES: CPT | Mod: GP

## 2021-08-26 PROCEDURE — 97116 GAIT TRAINING THERAPY: CPT | Mod: GP

## 2021-08-26 PROCEDURE — 250N000013 HC RX MED GY IP 250 OP 250 PS 637: Performed by: INTERNAL MEDICINE

## 2021-08-26 PROCEDURE — 80048 BASIC METABOLIC PNL TOTAL CA: CPT | Performed by: INTERNAL MEDICINE

## 2021-08-26 PROCEDURE — 83735 ASSAY OF MAGNESIUM: CPT | Performed by: INTERNAL MEDICINE

## 2021-08-26 PROCEDURE — 96376 TX/PRO/DX INJ SAME DRUG ADON: CPT

## 2021-08-26 PROCEDURE — 99207 PR CDG-CODE CATEGORY CHANGED: CPT | Performed by: INTERNAL MEDICINE

## 2021-08-26 PROCEDURE — G0378 HOSPITAL OBSERVATION PER HR: HCPCS

## 2021-08-26 PROCEDURE — 96375 TX/PRO/DX INJ NEW DRUG ADDON: CPT

## 2021-08-26 RX ORDER — LEVOFLOXACIN 250 MG/1
250 TABLET, FILM COATED ORAL DAILY
Qty: 5 TABLET | Refills: 0 | Status: SHIPPED | OUTPATIENT
Start: 2021-08-27 | End: 2021-09-01

## 2021-08-26 RX ADMIN — FAMOTIDINE 20 MG: 20 TABLET, FILM COATED ORAL at 09:02

## 2021-08-26 RX ADMIN — LOSARTAN POTASSIUM 100 MG: 25 TABLET, FILM COATED ORAL at 13:24

## 2021-08-26 RX ADMIN — HYDRALAZINE HYDROCHLORIDE 12.5 MG: 25 TABLET, FILM COATED ORAL at 09:02

## 2021-08-26 RX ADMIN — LEVOTHYROXINE SODIUM 50 MCG: 0.03 TABLET ORAL at 06:30

## 2021-08-26 RX ADMIN — MAGNESIUM 64 MG (MAGNESIUM CHLORIDE) TABLET,DELAYED RELEASE 535 MG: at 09:03

## 2021-08-26 RX ADMIN — SODIUM CHLORIDE TAB 1 GM 1.5 G: 1 TAB at 09:01

## 2021-08-26 RX ADMIN — IBUPROFEN 600 MG: 600 TABLET, FILM COATED ORAL at 09:02

## 2021-08-26 RX ADMIN — Medication 2000 UNITS: at 09:01

## 2021-08-26 RX ADMIN — ACETAMINOPHEN 975 MG: 325 TABLET ORAL at 09:02

## 2021-08-26 RX ADMIN — LEVOFLOXACIN 250 MG: 250 TABLET, FILM COATED ORAL at 09:02

## 2021-08-26 RX ADMIN — ACETAMINOPHEN 975 MG: 325 TABLET ORAL at 13:24

## 2021-08-26 RX ADMIN — ASPIRIN 81 MG: 81 TABLET, COATED ORAL at 13:24

## 2021-08-26 NOTE — PLAN OF CARE
PRIMARY DIAGNOSIS: GENERALIZED WEAKNESS    OUTPATIENT/OBSERVATION GOALS TO BE MET BEFORE DISCHARGE  1. Orthostatic performed: N/A    2. Tolerating PO medications: Yes    3. Return to near baseline physical activity: Yes    4. Cleared for discharge by consultants (if involved): Yes    Discharge Planner Nurse   Safe discharge environment identified: Yes  Barriers to discharge: No       Entered by: Roni Rodriguez 08/25/2021 8:05 PM     Please review provider order for any additional goals.   Nurse to notify provider when observation goals have been met and patient is ready for discharge.

## 2021-08-26 NOTE — DISCHARGE SUMMARY
[facility]  Hospitalist Discharge Summary      Date of Admission:  8/23/2021  Date of Discharge:  8/26/2021  Discharging Provider: Regino Fischer MD      Discharge Diagnoses   UTI  Fibromyalgia with chronic pain syndrome  Hypokalemia/hypomagnesemia, replaced   Hypertension  Chronic CHF, diastolic dysfunction  Paroxysmal A. fib  Hypothyroidism  Weakness and deconditioning    Follow-ups Needed After Discharge   Follow-up Appointments     Follow-up and recommended labs and tests       Follow up with primary care provider, Archana Patel, within 7 days for   hospital follow- up.  No follow up labs or test are needed.             Unresulted Labs Ordered in the Past 30 Days of this Admission     No orders found from 7/24/2021 to 8/24/2021.      These results will be followed up by PCP    Discharge Disposition   Discharged to home  Condition at discharge: Stable    Hospital Course   89-year-old female with HFpEF, HTN, recurrent urinary tract infections, dementia, hypothyroidism, asthma, IBS and history of prior TIA presents with diffuse weakness and confusion, found to have recurrent UTI.  Urine culture growing Klebsiella pneumonia resistant to multiple antibiotics but sensitive to Levaquin.  Patient mental condition did improve with treatment and she was back to her baseline.  She will finish Levaquin course as outpatient.  Electrolytes replaced per protocol and will continue magnesium on discharge  PT/OT evaluation recommended home care    Discussed with patient, caregiver, nursing staff and discharge planner    Consultations This Hospital Stay   SOCIAL WORK IP CONSULT  PHYSICAL THERAPY ADULT IP CONSULT  OCCUPATIONAL THERAPY ADULT IP CONSULT    Code Status   No CPR- Do NOT Intubate    Time Spent on this Encounter   IRegino MD, personally saw the patient today and spent greater than 30 minutes discharging this patient.       Regino Fischer MD  [facility]    [address] [place] [id number]  Phone: [phone]  Fax: [phone]  ______________________________________________________________________    Physical Exam   Vital Signs: Temp: 98.7  F (37.1  C) Temp src: Oral BP: (!) 170/63 Pulse: 66   Resp: 16 SpO2: 97 % O2 Device: None (Room air)    Weight: 161 lbs 11.2 oz    General: Sitting comfortable on chair no acute respiratory distress  RESPIRATORY: Breathing nonlabored  CARDIOVASCULAR: No le edema bilat.   ABDOMEN: soft and non-tender  NEUROLOGIC: Non focal, motor and sensory intact, speech clear  Extremities: No edema.  PSYCHIATRIC: Appropriate with examiner.    Primary Care Physician   Archana Patel    Discharge Orders      Home Care PT Referral for Hospital Discharge      Home Care OT Referral for Hospital Discharge      Reason for your hospital stay    UTI and weakness     Follow-up and recommended labs and tests     Follow up with primary care provider, Archana Patel, within 7 days for hospital follow- up.  No follow up labs or test are needed.     Activity    Your activity upon discharge: activity as tolerated     MD face to face encounter    Documentation of Face to Face and Certification for Home Health Services    I certify that patient: Ani Porter is under my care and that I, or a nurse practitioner or physician's assistant working with me, had a face-to-face encounter that meets the physician face-to-face encounter requirements with this patient on: 8/26/2021.    This encounter with the patient was in whole, or in part, for the following medical condition, which is the primary reason for home health care: weakness and deconditioning .    I certify that, based on my findings, the following services are medically necessary home health services: Occupational Therapy and Physical Therapy.    My clinical findings support the need for the above services because: Occupational Therapy Services are needed to assess and treat cognitive ability  and address ADL safety due to impairment in weakness . and Physical Therapy Services are needed to assess and treat the following functional impairments: weakness .    Further, I certify that my clinical findings support that this patient is homebound (i.e. absences from home require considerable and taxing effort and are for medical reasons or [facility] services or infrequently or of short duration when for other reasons) because: Requires assistance of another person or specialized equipment to access medical services because patient: Is prone to wander/get lost without assistance...    Based on the above findings. I certify that this patient is confined to the home and needs intermittent skilled nursing care, physical therapy and/or speech therapy.  The patient is under my care, and I have initiated the establishment of the plan of care.  This patient will be followed by a physician who will periodically review the plan of care.  Physician/Provider to provide follow up care: Archana Patel    Attending hospital physician (the Medicare certified [facility] provider): Regino Fischer MD  Physician Signature: See electronic signature associated with these discharge orders.  Date: 8/26/2021     Diet    Follow this diet upon discharge: Orders Placed This Encounter      Regular Diet Adult       Significant Results and Procedures   Most Recent 3 CBC's:Recent Labs   Lab Test 08/24/21  0538 08/23/21  1741 05/08/21  0739   WBC 9.4 11.4* 5.0   HGB 11.7 10.0* 11.0*   MCV 93 94 94    276 216     Most Recent 3 BMP's:Recent Labs   Lab Test 08/26/21  0456 08/25/21  0543 08/24/21  0538   * 132* 135*   POTASSIUM 3.8 3.4* 3.6   CHLORIDE 99 98 99   CO2 25 24 24   BUN 13 17 15   CR 0.70 0.70 0.73   ANIONGAP 8 10 12   CHAMP 9.1 8.9 9.2   GLC 92 108 131*   ,   Results for orders placed or performed during the hospital encounter of 08/23/21   XR Chest Port 1 View    Narrative    EXAM: XR CHEST PORT 1 VIEW  LOCATION: [facility]  DATE/TIME: 8/23/2021 5:40 PM    INDICATION: weakness  COMPARISON: 05/06/2021      Impression    IMPRESSION: Some stable minimal fibrosis in lung bases. Chest otherwise negative. No acute new findings.   Head CT w/o contrast    Narrative    EXAM: CT HEAD W/O CONTRAST  LOCATION: [facility]  DATE/TIME: 8/23/2021 6:35 PM    INDICATION: Head injury  COMPARISON: 05/06/2021  TECHNIQUE: Routine CT Head without IV contrast. Multiplanar reformats. Dose reduction techniques were used.    FINDINGS:  INTRACRANIAL CONTENTS: No intracranial hemorrhage, extraaxial collection, or mass effect.  No CT evidence of acute infarct. Moderate presumed chronic small vessel ischemic changes. Moderate generalized volume loss. No hydrocephalus.     VISUALIZED ORBITS/SINUSES/MASTOIDS: No intraorbital abnormality. No paranasal sinus mucosal disease. No middle ear or mastoid effusion.    BONES/SOFT TISSUES: No acute abnormality.      Impression    IMPRESSION:  1.  No acute intracranial process.       Discharge Medications   Current Discharge Medication List      START taking these medications    Details   levofloxacin (LEVAQUIN) 250 MG tablet Take 1 tablet (250 mg) by mouth daily for 5 days  Qty: 5 tablet, Refills: 0    Associated Diagnoses: Acute cystitis without hematuria      magnesium chloride 535 (64 Mg) MG TBEC CR tablet Take 1 tablet (535 mg) by mouth daily  Qty: 30 tablet, Refills: 0    Associated Diagnoses: Hypomagnesemia         CONTINUE these medications which have NOT CHANGED    Details   acetaminophen (TYLENOL) 500 MG tablet Take 1,000 mg by mouth 2 times daily      albuterol (PROAIR HFA/PROVENTIL HFA/VENTOLIN HFA) 108 (90 Base) MCG/ACT inhaler Inhale 1-2 puffs into the lungs every 4 hours as needed      aspirin (ASA) 81 MG EC tablet Take 81 mg by mouth daily (with lunch)       benzonatate (TESSALON) 200 MG capsule Take 200 mg by mouth At Bedtime      cholecalciferol 25 MCG (1000  UT) TABS Take 2,000 Units by mouth daily      cyanocobalamin (CYANOCOBALAMIN) 1000 MCG/ML injection Inject 1 mL into the muscle every 30 days      DULoxetine (CYMBALTA) 60 MG capsule Take 60 mg by mouth At Bedtime      famotidine (PEPCID) 20 MG tablet Take 20 mg by mouth 2 times daily      hydrALAZINE (APRESOLINE) 25 MG tablet Take 12.5 mg by mouth 2 times daily      hydrOXYzine (ATARAX) 25 MG tablet Take 25 mg by mouth At Bedtime      !! ibuprofen (ADVIL/MOTRIN) 200 MG tablet Take 600 mg by mouth every morning      !! ibuprofen (ADVIL/MOTRIN) 200 MG tablet Take 400 mg by mouth At Bedtime      Lactobacillus (PROBIOTIC ACIDOPHILUS PO) Take 1 capsule by mouth daily      levothyroxine (SYNTHROID/LEVOTHROID) 50 MCG tablet Take 50 mcg by mouth daily before breakfast       loperamide (IMODIUM) 2 MG capsule Take 2 mg by mouth 4 times daily as needed      losartan (COZAAR) 100 MG tablet Take 100 mg by mouth daily (with lunch)      medical cannabis (Patient's own supply) Take 1 Dose by mouth See Admin Instructions (The purpose of this order is to document that the patient reports taking medical cannabis.  This is not a prescription, and is not used to certify that the patient has a qualifying medical condition.)    Tangerine capsule  Take 1 capsule in the morning and 1 hour prior to sleep with milk    Emulsion with supper      Multiple Vitamins-Minerals (CENTRUM SILVER 50+WOMEN PO) Take 1 tablet by mouth daily      Multiple Vitamins-Minerals (PRESERVISION AREDS) CAPS Take 1 capsule by mouth 2 times daily      polyethylene glycol-propylene glycol (SYSTANE) 0.4-0.3 % SOLN ophthalmic solution Place 2 drops into both eyes 3 times daily as needed       !! - Potential duplicate medications found. Please discuss with provider.        Allergies   Allergies   Allergen Reactions     Other Allergy (See Comments) [External Allergen Needs Reconciliation - See Comment] Other (See Comments)     Patient Reports Reaction to FLU SHOT - Egg  allergy!!, FLU SHOT - Egg allergy, PN: FLU SHOT - Egg allergy     Citalopram Analogues [Citalopram] Unknown and Other (See Comments)     Per pt and outside records     Demeclocycline Unknown     Doxycycline Unknown and Other (See Comments)     Per pt and outside records;     Erythromycin Unknown and Other (See Comments)     Per pt and outside records;      Erythromycin Ethylsuccinate [Erythromycin] Unknown     Hydromorphone Other (See Comments)     Depression, PN: Depression, Patient reports having depression     Iodine And Iodide Containing Products [Iodine] Other (See Comments) and Unknown     unknown, Per pt and outside records, pt's son confirms she is allergic to Iodine, PN: unknown     Sulfa (Sulfonamide Antibiotics) [Sulfa Drugs] Unknown, Other (See Comments) and Nausea and Vomiting     Confusion,, Other reaction(s): Confusion, Other (See Comments), confusion     Sulfasalazine Nausea and Vomiting     Terfenadine Unknown and Other (See Comments)     Other reaction(s): *Unknown, Per pt and outside records;     Tetracycline Other (See Comments) and Unknown     Per pt and outside records     Venlafaxine Analogues [Venlafaxine] Unknown     Dizziness and confusion     Cephalosporins Nausea and Vomiting, Itching, Other (See Comments) and Rash     Itching     Prednisone Unknown, Anxiety and Other (See Comments)     Other reaction(s): Confusion, Hallucinations, Confusion, Confusion, hallucination,

## 2021-08-26 NOTE — PLAN OF CARE
PRIMARY DIAGNOSIS: GENERALIZED WEAKNESS    OUTPATIENT/OBSERVATION GOALS TO BE MET BEFORE DISCHARGE  1. Orthostatic performed: N/A    2. Tolerating PO medications: Yes    3. Return to near baseline physical activity: Yes    4. Cleared for discharge by consultants (if involved): Yes    Discharge Planner Nurse   Safe discharge environment identified: Yes  Barriers to discharge: Yes       Entered by: Roni Rodriguez 08/26/2021 4:33 AM     Please review provider order for any additional goals.   Nurse to notify provider when observation goals have been met and patient is ready for discharge.

## 2021-08-26 NOTE — PLAN OF CARE
PRIMARY DIAGNOSIS: ACUTE PAIN  OUTPATIENT/OBSERVATION GOALS TO BE MET BEFORE DISCHARGE:  1. Pain Status: Improved-controlled with oral pain medications.    2. Return to near baseline physical activity: Yes    3. Cleared for discharge by consultants (if involved): Yes    Discharge Planner Nurse   Safe discharge environment identified: Yes  Barriers to discharge: No       Entered by: Jose G Delgado 08/26/2021 2:35 PM   Patient gaining strength   Please review provider order for any additional goals.   Nurse to notify provider when observation goals have been met and patient is ready for discharge.

## 2021-08-26 NOTE — PROGRESS NOTES
Care Management Discharge Note    Discharge Date: 08/26/2021  Expected Time of Departure: 1530    Discharge Disposition: Home with PCA services    Discharge Services: PCA    Discharge DME: None    Discharge Transportation: family or friend will provide (caregiver)    Private pay costs discussed: Not applicable    PAS Confirmation Code:  Not applicable  Patient/family educated on Medicare website which has current facility and service quality ratings: yes    Education Provided on the Discharge Plan:  yes  Persons Notified of Discharge Plans: yes, fernanda Ayala  Patient/Family in Agreement with the Plan: yes    Handoff Referral Completed: Yes    Additional Information:  Call placed to step-Jamie foster to discuss discharge planning. He states he has 24 hr PCA services arranged with [facility]. Discussed home PT and OT option per hospital therapy recommendation and Jamie declines. He will f/u with PCP at later date if they decide on this later.  Pt's caregiver is in room with her and will transport home when ready.    Call placed to [facility] and provided update and confirmed they are planning on providing 24/7 PCA services.    Elisa Dawson RN

## 2021-08-26 NOTE — PROGRESS NOTES
Patient discharged home with belongings at 1530, PCA transporting patient. Patient medically stable, VSS.

## 2021-08-26 NOTE — PLAN OF CARE
PRIMARY DIAGNOSIS: GENERALIZED WEAKNESS    OUTPATIENT/OBSERVATION GOALS TO BE MET BEFORE DISCHARGE  1. Orthostatic performed: N/A    2. Tolerating PO medications: Yes    3. Return to near baseline physical activity: Yes    4. Cleared for discharge by consultants (if involved): Yes    Discharge Planner Nurse   Safe discharge environment identified: Yes  Barriers to discharge: No       Entered by: Roni Rodriguez 08/26/2021 12:59 AM     Please review provider order for any additional goals.   Nurse to notify provider when observation goals have been met and patient is ready for discharge.

## 2021-08-27 ENCOUNTER — PATIENT OUTREACH (OUTPATIENT)
Dept: CARE COORDINATION | Facility: CLINIC | Age: 86
End: 2021-08-27

## 2021-08-27 DIAGNOSIS — Z71.89 OTHER SPECIFIED COUNSELING: ICD-10-CM

## 2021-08-27 NOTE — PROGRESS NOTES
Clinic Care Coordination Contact  [facility]/Voicemail       Clinical Data: Care Coordinator Outreach  Outreach attempted x 1.  Left message on patient's voicemail with call back information and requested return call.  Plan: Care Coordinator will try to reach patient again in 1-2 business days.    TRAMAINE Mathis  [phone]  [facility]

## 2021-08-28 NOTE — PROGRESS NOTES
Clinic Care Coordination Contact  [facility]/Voicemail       Clinical Data: Care Coordinator Outreach  Outreach attempted x 2.  Left message with patient's home health nurse / caretaker. Plan: Care Coordinator will do no further outreaches at this time.     Kristine Vance  Community Health Worker  [facility]  Ph: [phone]

## 2021-11-22 ENCOUNTER — APPOINTMENT (OUTPATIENT)
Dept: RADIOLOGY | Facility: HOSPITAL | Age: 86
DRG: 689 | End: 2021-11-22
Attending: EMERGENCY MEDICINE
Payer: MEDICARE

## 2021-11-22 ENCOUNTER — APPOINTMENT (OUTPATIENT)
Dept: CT IMAGING | Facility: HOSPITAL | Age: 86
DRG: 689 | End: 2021-11-22
Attending: EMERGENCY MEDICINE
Payer: MEDICARE

## 2021-11-22 ENCOUNTER — HOSPITAL ENCOUNTER (INPATIENT)
Facility: HOSPITAL | Age: 86
LOS: 2 days | Discharge: HOME HEALTH | DRG: 689 | End: 2021-11-26
Attending: EMERGENCY MEDICINE | Admitting: FAMILY MEDICINE
Payer: MEDICARE

## 2021-11-22 DIAGNOSIS — G47.9 SLEEP DISORDER: ICD-10-CM

## 2021-11-22 DIAGNOSIS — M48.061 SPINAL STENOSIS, LUMBAR REGION, WITHOUT NEUROGENIC CLAUDICATION: ICD-10-CM

## 2021-11-22 DIAGNOSIS — Z74.09 IMPAIRED MOBILITY AND ADLS: ICD-10-CM

## 2021-11-22 DIAGNOSIS — R62.7 ADULT FAILURE TO THRIVE: ICD-10-CM

## 2021-11-22 DIAGNOSIS — Z78.9 IMPAIRED MOBILITY AND ADLS: ICD-10-CM

## 2021-11-22 DIAGNOSIS — N39.0 URINARY TRACT INFECTION WITHOUT HEMATURIA, SITE UNSPECIFIED: Primary | ICD-10-CM

## 2021-11-22 DIAGNOSIS — R29.6 FALLS FREQUENTLY: ICD-10-CM

## 2021-11-22 DIAGNOSIS — I16.0 ASYMPTOMATIC HYPERTENSIVE URGENCY: ICD-10-CM

## 2021-11-22 LAB
ALBUMIN SERPL-MCNC: 3.9 G/DL (ref 3.5–5)
ALBUMIN UR-MCNC: 100 MG/DL
ALP SERPL-CCNC: 55 U/L (ref 45–120)
ALT SERPL W P-5'-P-CCNC: 12 U/L (ref 0–45)
ANION GAP SERPL CALCULATED.3IONS-SCNC: 6 MMOL/L (ref 5–18)
APPEARANCE UR: ABNORMAL
AST SERPL W P-5'-P-CCNC: 16 U/L (ref 0–40)
BACTERIA #/AREA URNS HPF: ABNORMAL /HPF
BASOPHILS # BLD AUTO: 0 10E3/UL (ref 0–0.2)
BASOPHILS NFR BLD AUTO: 1 %
BILIRUB SERPL-MCNC: 0.7 MG/DL (ref 0–1)
BILIRUB UR QL STRIP: NEGATIVE
BUN SERPL-MCNC: 16 MG/DL (ref 8–28)
CALCIUM SERPL-MCNC: 9.4 MG/DL (ref 8.5–10.5)
CHLORIDE BLD-SCNC: 99 MMOL/L (ref 98–107)
CO2 SERPL-SCNC: 29 MMOL/L (ref 22–31)
COLOR UR AUTO: YELLOW
CREAT SERPL-MCNC: 0.94 MG/DL (ref 0.6–1.1)
EOSINOPHIL # BLD AUTO: 0.2 10E3/UL (ref 0–0.7)
EOSINOPHIL NFR BLD AUTO: 2 %
ERYTHROCYTE [DISTWIDTH] IN BLOOD BY AUTOMATED COUNT: 13 % (ref 10–15)
GFR SERPL CREATININE-BSD FRML MDRD: 53 ML/MIN/1.73M2
GLUCOSE BLD-MCNC: 150 MG/DL (ref 70–125)
GLUCOSE UR STRIP-MCNC: NEGATIVE MG/DL
HCT VFR BLD AUTO: 40.2 % (ref 35–47)
HGB BLD-MCNC: 13.4 G/DL (ref 11.7–15.7)
HGB UR QL STRIP: ABNORMAL
HOLD SPECIMEN: NORMAL
IMM GRANULOCYTES # BLD: 0 10E3/UL
IMM GRANULOCYTES NFR BLD: 0 %
KETONES UR STRIP-MCNC: NEGATIVE MG/DL
LEUKOCYTE ESTERASE UR QL STRIP: ABNORMAL
LYMPHOCYTES # BLD AUTO: 1.6 10E3/UL (ref 0.8–5.3)
LYMPHOCYTES NFR BLD AUTO: 25 %
MAGNESIUM SERPL-MCNC: 1.8 MG/DL (ref 1.8–2.6)
MCH RBC QN AUTO: 30.5 PG (ref 26.5–33)
MCHC RBC AUTO-ENTMCNC: 33.3 G/DL (ref 31.5–36.5)
MCV RBC AUTO: 91 FL (ref 78–100)
MONOCYTES # BLD AUTO: 0.6 10E3/UL (ref 0–1.3)
MONOCYTES NFR BLD AUTO: 9 %
MUCOUS THREADS #/AREA URNS LPF: PRESENT /LPF
NEUTROPHILS # BLD AUTO: 4.1 10E3/UL (ref 1.6–8.3)
NEUTROPHILS NFR BLD AUTO: 63 %
NITRATE UR QL: NEGATIVE
NRBC # BLD AUTO: 0 10E3/UL
NRBC BLD AUTO-RTO: 0 /100
PH UR STRIP: 6 [PH] (ref 5–7)
PLATELET # BLD AUTO: 269 10E3/UL (ref 150–450)
POTASSIUM BLD-SCNC: 3.5 MMOL/L (ref 3.5–5)
PROT SERPL-MCNC: 6.7 G/DL (ref 6–8)
RBC # BLD AUTO: 4.4 10E6/UL (ref 3.8–5.2)
RBC URINE: 2 /HPF
SARS-COV-2 RNA RESP QL NAA+PROBE: NEGATIVE
SODIUM SERPL-SCNC: 134 MMOL/L (ref 136–145)
SP GR UR STRIP: 1.02 (ref 1–1.03)
SQUAMOUS EPITHELIAL: <1 /HPF
TROPONIN I SERPL-MCNC: 0.03 NG/ML (ref 0–0.29)
UROBILINOGEN UR STRIP-MCNC: <2 MG/DL
WBC # BLD AUTO: 6.5 10E3/UL (ref 4–11)
WBC URINE: >182 /HPF

## 2021-11-22 PROCEDURE — 96360 HYDRATION IV INFUSION INIT: CPT

## 2021-11-22 PROCEDURE — 258N000003 HC RX IP 258 OP 636: Performed by: EMERGENCY MEDICINE

## 2021-11-22 PROCEDURE — 250N000013 HC RX MED GY IP 250 OP 250 PS 637: Performed by: EMERGENCY MEDICINE

## 2021-11-22 PROCEDURE — 250N000011 HC RX IP 250 OP 636: Performed by: FAMILY MEDICINE

## 2021-11-22 PROCEDURE — 81001 URINALYSIS AUTO W/SCOPE: CPT | Performed by: EMERGENCY MEDICINE

## 2021-11-22 PROCEDURE — 99285 EMERGENCY DEPT VISIT HI MDM: CPT | Mod: 25

## 2021-11-22 PROCEDURE — 87086 URINE CULTURE/COLONY COUNT: CPT | Performed by: EMERGENCY MEDICINE

## 2021-11-22 PROCEDURE — 250N000013 HC RX MED GY IP 250 OP 250 PS 637: Performed by: FAMILY MEDICINE

## 2021-11-22 PROCEDURE — 93005 ELECTROCARDIOGRAM TRACING: CPT | Performed by: EMERGENCY MEDICINE

## 2021-11-22 PROCEDURE — 83735 ASSAY OF MAGNESIUM: CPT | Performed by: EMERGENCY MEDICINE

## 2021-11-22 PROCEDURE — 96361 HYDRATE IV INFUSION ADD-ON: CPT

## 2021-11-22 PROCEDURE — 71045 X-RAY EXAM CHEST 1 VIEW: CPT

## 2021-11-22 PROCEDURE — 84484 ASSAY OF TROPONIN QUANT: CPT | Performed by: EMERGENCY MEDICINE

## 2021-11-22 PROCEDURE — C9803 HOPD COVID-19 SPEC COLLECT: HCPCS

## 2021-11-22 PROCEDURE — 96374 THER/PROPH/DIAG INJ IV PUSH: CPT

## 2021-11-22 PROCEDURE — 85004 AUTOMATED DIFF WBC COUNT: CPT | Performed by: EMERGENCY MEDICINE

## 2021-11-22 PROCEDURE — G0378 HOSPITAL OBSERVATION PER HR: HCPCS

## 2021-11-22 PROCEDURE — 72125 CT NECK SPINE W/O DYE: CPT

## 2021-11-22 PROCEDURE — 36415 COLL VENOUS BLD VENIPUNCTURE: CPT | Performed by: EMERGENCY MEDICINE

## 2021-11-22 PROCEDURE — 82040 ASSAY OF SERUM ALBUMIN: CPT | Performed by: EMERGENCY MEDICINE

## 2021-11-22 PROCEDURE — 99219 PR INITIAL OBSERVATION CARE,LEVEL II: CPT | Performed by: FAMILY MEDICINE

## 2021-11-22 PROCEDURE — 87635 SARS-COV-2 COVID-19 AMP PRB: CPT | Performed by: EMERGENCY MEDICINE

## 2021-11-22 PROCEDURE — 70450 CT HEAD/BRAIN W/O DYE: CPT

## 2021-11-22 RX ORDER — VITAMIN B COMPLEX
2000 TABLET ORAL
Status: DISCONTINUED | OUTPATIENT
Start: 2021-11-23 | End: 2021-11-26 | Stop reason: HOSPADM

## 2021-11-22 RX ORDER — LORAZEPAM 0.5 MG/1
0.25 TABLET ORAL
Status: COMPLETED | OUTPATIENT
Start: 2021-11-22 | End: 2021-11-23

## 2021-11-22 RX ORDER — VIT A/VIT C/VIT E/ZINC/COPPER 4296-226
1 CAPSULE ORAL 2 TIMES DAILY WITH MEALS
Status: DISCONTINUED | OUTPATIENT
Start: 2021-11-23 | End: 2021-11-22 | Stop reason: CLARIF

## 2021-11-22 RX ORDER — LANOLIN ALCOHOL/MO/W.PET/CERES
1000 CREAM (GRAM) TOPICAL
Status: DISCONTINUED | OUTPATIENT
Start: 2021-11-23 | End: 2021-11-26 | Stop reason: HOSPADM

## 2021-11-22 RX ORDER — DULOXETIN HYDROCHLORIDE 60 MG/1
60 CAPSULE, DELAYED RELEASE ORAL AT BEDTIME
Status: DISCONTINUED | OUTPATIENT
Start: 2021-11-22 | End: 2021-11-26 | Stop reason: HOSPADM

## 2021-11-22 RX ORDER — IBUPROFEN 400 MG/1
400 TABLET, FILM COATED ORAL ONCE
Status: COMPLETED | OUTPATIENT
Start: 2021-11-22 | End: 2021-11-22

## 2021-11-22 RX ORDER — CIPROFLOXACIN 2 MG/ML
400 INJECTION, SOLUTION INTRAVENOUS ONCE
Status: DISCONTINUED | OUTPATIENT
Start: 2021-11-22 | End: 2021-11-22

## 2021-11-22 RX ORDER — ONDANSETRON 2 MG/ML
4 INJECTION INTRAMUSCULAR; INTRAVENOUS EVERY 6 HOURS PRN
Status: DISCONTINUED | OUTPATIENT
Start: 2021-11-22 | End: 2021-11-26 | Stop reason: HOSPADM

## 2021-11-22 RX ORDER — FAMOTIDINE 20 MG/1
20 TABLET, FILM COATED ORAL DAILY
Status: DISCONTINUED | OUTPATIENT
Start: 2021-11-23 | End: 2021-11-26 | Stop reason: HOSPADM

## 2021-11-22 RX ORDER — POLYETHYLENE GLYCOL 3350 17 G/17G
1 POWDER, FOR SOLUTION ORAL 2 TIMES DAILY PRN
COMMUNITY
End: 2022-10-06

## 2021-11-22 RX ORDER — BENZONATATE 100 MG/1
200 CAPSULE ORAL 2 TIMES DAILY
Status: DISCONTINUED | OUTPATIENT
Start: 2021-11-22 | End: 2021-11-26 | Stop reason: HOSPADM

## 2021-11-22 RX ORDER — CIPROFLOXACIN 2 MG/ML
400 INJECTION, SOLUTION INTRAVENOUS EVERY 12 HOURS
Status: DISCONTINUED | OUTPATIENT
Start: 2021-11-22 | End: 2021-11-25

## 2021-11-22 RX ORDER — HYDROXYZINE HYDROCHLORIDE 25 MG/1
25 TABLET, FILM COATED ORAL AT BEDTIME
Status: DISCONTINUED | OUTPATIENT
Start: 2021-11-22 | End: 2021-11-24

## 2021-11-22 RX ORDER — ASPIRIN 81 MG/1
81 TABLET ORAL
Status: DISCONTINUED | OUTPATIENT
Start: 2021-11-22 | End: 2021-11-26 | Stop reason: HOSPADM

## 2021-11-22 RX ORDER — ONDANSETRON 4 MG/1
4 TABLET, ORALLY DISINTEGRATING ORAL EVERY 6 HOURS PRN
Status: DISCONTINUED | OUTPATIENT
Start: 2021-11-22 | End: 2021-11-26 | Stop reason: HOSPADM

## 2021-11-22 RX ORDER — LEVOTHYROXINE SODIUM 25 UG/1
50 TABLET ORAL
Status: DISCONTINUED | OUTPATIENT
Start: 2021-11-23 | End: 2021-11-26 | Stop reason: HOSPADM

## 2021-11-22 RX ORDER — VITS A,C,E/LUTEIN/MINERALS 300MCG-200
1 TABLET ORAL 2 TIMES DAILY WITH MEALS
Status: DISCONTINUED | OUTPATIENT
Start: 2021-11-23 | End: 2021-11-26 | Stop reason: HOSPADM

## 2021-11-22 RX ORDER — ACETAMINOPHEN 325 MG/1
975 TABLET ORAL 2 TIMES DAILY
Status: DISCONTINUED | OUTPATIENT
Start: 2021-11-22 | End: 2021-11-26 | Stop reason: HOSPADM

## 2021-11-22 RX ORDER — VIT C/E/ZN/COPPR/LUTEIN/ZEAXAN 60 MG-6 MG
1 CAPSULE ORAL 2 TIMES DAILY WITH MEALS
Status: DISCONTINUED | OUTPATIENT
Start: 2021-11-23 | End: 2021-11-22 | Stop reason: CLARIF

## 2021-11-22 RX ADMIN — ACETAMINOPHEN 975 MG: 325 TABLET ORAL at 22:57

## 2021-11-22 RX ADMIN — BENZONATATE 200 MG: 100 CAPSULE ORAL at 22:56

## 2021-11-22 RX ADMIN — CIPROFLOXACIN 400 MG: 2 INJECTION, SOLUTION INTRAVENOUS at 22:57

## 2021-11-22 RX ADMIN — HYDRALAZINE HYDROCHLORIDE 12.5 MG: 25 TABLET, FILM COATED ORAL at 22:57

## 2021-11-22 RX ADMIN — HYDROXYZINE HYDROCHLORIDE 25 MG: 25 TABLET, FILM COATED ORAL at 22:56

## 2021-11-22 RX ADMIN — ASPIRIN 81 MG: 81 TABLET, COATED ORAL at 22:56

## 2021-11-22 RX ADMIN — IBUPROFEN 400 MG: 400 TABLET, FILM COATED ORAL at 21:49

## 2021-11-22 RX ADMIN — DULOXETINE HYDROCHLORIDE 60 MG: 60 CAPSULE, DELAYED RELEASE ORAL at 22:56

## 2021-11-22 RX ADMIN — SODIUM CHLORIDE 500 ML: 9 INJECTION, SOLUTION INTRAVENOUS at 16:32

## 2021-11-22 ASSESSMENT — ACTIVITIES OF DAILY LIVING (ADL)
TOILETING_MANAGEMENT: INCONTINENCE
FALL_HISTORY_WITHIN_LAST_SIX_MONTHS: YES
DEPENDENT_IADLS:: MEDICATION MANAGEMENT;TRANSPORTATION;MEAL PREPARATION;SHOPPING;LAUNDRY;COOKING;CLEANING
HEARING_DIFFICULTY_OR_DEAF: YES
PATIENT'S_PREFERRED_MEANS_OF_COMMUNICATION: VERBAL
CONCENTRATING,_REMEMBERING_OR_MAKING_DECISIONS_DIFFICULTY: YES
WEAR_GLASSES_OR_BLIND: YES
DRESSING/BATHING_DIFFICULTY: YES
NUMBER_OF_TIMES_PATIENT_HAS_FALLEN_WITHIN_LAST_SIX_MONTHS: 3
DIFFICULTY_COMMUNICATING: NO
WALKING_OR_CLIMBING_STAIRS: AMBULATION DIFFICULTY, REQUIRES EQUIPMENT
DESCRIBE_HEARING_LOSS: HEARING LOSS ON RIGHT SIDE;HEARING LOSS ON LEFT SIDE
WALKING_OR_CLIMBING_STAIRS_DIFFICULTY: YES
EQUIPMENT_CURRENTLY_USED_AT_HOME: WHEELCHAIR, MANUAL;WALKER, ROLLING
DOING_ERRANDS_INDEPENDENTLY_DIFFICULTY: YES
DRESSING/BATHING: BATHING DIFFICULTY, ASSISTANCE 1 PERSON
DIFFICULTY_EATING/SWALLOWING: NO
VISION_MANAGEMENT: GLASSES
TOILETING_ASSISTANCE: TOILETING DIFFICULTY, ASSISTANCE 1 PERSON
WHICH_OF_THE_ABOVE_FUNCTIONAL_RISKS_HAD_A_RECENT_ONSET_OR_CHANGE?: FALL HISTORY
TOILETING_ISSUES: YES

## 2021-11-22 ASSESSMENT — ENCOUNTER SYMPTOMS
SHORTNESS OF BREATH: 0
CONFUSION: 1
DIARRHEA: 1
NAUSEA: 0
JOINT SWELLING: 0
VOMITING: 0
SORE THROAT: 0
FEVER: 0
DIZZINESS: 1
COUGH: 1
CHILLS: 0
ABDOMINAL PAIN: 0
HEMATURIA: 0
DYSURIA: 0

## 2021-11-22 ASSESSMENT — MIFFLIN-ST. JEOR: SCORE: 1058.16

## 2021-11-22 NOTE — ED NOTES
Pt presents NAD. SKIN: NWD.   HEENT: normocephalic, PERRL, clear x 3.   CHEST: symmetrical rise, CEBBS, no CP or SOB.  ABD: NTND, no N&V or diarrhea.  EXT: VÁZQUEZ x 4  Pt c/o generalized weakness and lightheadedness. Pt more confused than usual per her caretaker.  Rest of exam unremarkable.

## 2021-11-22 NOTE — ED PROVIDER NOTES
Emergency Department Encounter     Evaluation Date & Time:   11/22/2021  3:33 PM    CHIEF COMPLAINT:  Weakness and Lightheadedness      Triage Note:Pt brought in by [facility].  Pt comes from an assisted living facility where she's recently had increased weakness, lightheadedness, and increased confusion.  Pt's caretaker states the pt gets frequent UTIs.        ED COURSE & MEDICAL DECISION MAKING:     ED Course as of 11/22/21 2102   Mon Nov 22, 2021   1843 CT imaging negative for acute pathology.    CXR (independent interpretation): no acute cardiopulmonary process      1843 Awaiting UA, but serum labs unremarkable and covid negative.   1945 UA consistent with infection.  Pt with multiple allergies and previous culture from August was sensitive to cipro, so using fluoroquinolone as a result.  Pt will need hospitalization.     Pt presenting from independent living for worsening decline since Friday with increased weakness, increased confusion from baseline (mild dementia at baseline), difficulty with ADLs, multiple falls without injury.  Pt is not anticoagulated and no reported head injuries.  However, warrants imaging given age and falls.  Pt with frequent care, multiple days a week and caregiver here now reporting she suspect UTI as she's had similar ones in the past and developed confusion similar to what she's experiencing now.  Primary issue, however, is that pt is unsafe to be at home as she's independent and they cannot increase resources currently.  She is on list to move into assisted living facility, but this has not happened yet.    3:34 PM I met with the patient, obtained history, performed an initial exam, and discussed options and plan for diagnostics and treatment here in the ED.     I spoke with Dr. Bello, who accepts for hospitalization.  Pt hospitalized for UTI, impaired ADLs, falls.  Pt and caregiver updated, agreeable.      At the conclusion of the encounter I discussed the results of all  the tests and the disposition. The questions were answered. The patient or family acknowledged understanding and was agreeable with the care plan.      MEDICATIONS GIVEN IN THE EMERGENCY DEPARTMENT:  Medications   ibuprofen (ADVIL/MOTRIN) tablet 400 mg (has no administration in time range)   LORazepam (ATIVAN) half-tab 0.25 mg (has no administration in time range)   0.9% sodium chloride BOLUS (0 mLs Intravenous Stopped 11/22/21 8062)       NEW PRESCRIPTIONS STARTED AT TODAY'S ED VISIT:  Current Discharge Medication List          HPI   The history is provided by a caregiver and the patient.        Ani Porter is a 89 year old female with a pertinent history of TIA, confusion, CHF, UTI, HTN, hypothyroidism, and incontinent of urine who presents to this ED via EMS for evaluation of weakness and lightheadedness.    Patient reports dizziness and weakness, today she lowered herself to the ground. Noticed more dizziness tonight. She notes that she's recently had a couple of falls. Denies hitting her head or loss of consciousness. She states she does not know how she falls. Patient endorses a cough, which is chronic.    Caregiver reports the patient gets UTIs frequently. The caregiver works with the patient 4 days out of the week for the past 5 years, last visiting her on Thursday (5 days ago) and the patient had strong smelling urine. The caregiver returned today and learned the patient had fallen twice over the weekend. The patient did not know what to do with her walker, how to use utensils or what to do with her feet. Caregiver reports the patient had a fall today at 4 AM, no injuries from falls. Caregiver endorses the patient is more confused than normal. She notes incontinence is chronic, and diarrhea over the weekend. She denies fevers. Caregiver reports the patient does not receive 24 hour care, but is moving to assisted living at the end of December. Last Tuesday (6 days ago), patient had an appointment with  the eye doctor and had a fainting spell, her blood pressure was 193/137. Caregiver denies blood thinners. She notes the patient has not been her humorous self recently. She denies any other current complaints.      REVIEW OF SYSTEMS:  Review of Systems   Constitutional: Negative for chills and fever.   HENT: Negative for sore throat.    Eyes: Negative for visual disturbance.   Respiratory: Positive for cough (chronic). Negative for shortness of breath.    Cardiovascular: Negative for chest pain.   Gastrointestinal: Positive for diarrhea. Negative for abdominal pain, nausea and vomiting.   Endocrine: Negative for polyuria.   Genitourinary: Positive for enuresis. Negative for dysuria and hematuria.        Positive for smelly urine.   Musculoskeletal: Negative for joint swelling.   Skin: Negative for rash.   Neurological: Positive for dizziness and syncope.   Psychiatric/Behavioral: Positive for confusion.   All other systems reviewed and are negative.        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    Social History     Tobacco Use     Smoking status: Never Smoker     Smokeless tobacco: Never Used   Substance Use Topics     Alcohol  use: Yes     Alcohol/week: 2.5 standard drinks     Comment: Alcoholic Drinks/day: very rarely     Drug use: No       No current outpatient medications on file.      Physical Exam     Triage Vitals:  ED Triage Vitals [11/22/21 1540]   Enc Vitals Group      BP (!) 156/72      Pulse 85      Resp 16      Temp 98.3  F (36.8  C)      Temp src Oral      SpO2 97 %      Weight 73.3 kg (161 lb 9.6 oz)      Height       Head Circumference       Peak Flow       Pain Score       Pain Loc       Pain Edu?       Excl. in GC?         Vitals:  BP (!) 165/77   Pulse 67   Temp 98.3  F (36.8  C) (Oral)   Resp 22   Wt 73.3 kg (161 lb 9.6 oz)   SpO2 95%   BMI 29.56 kg/m      PHYSICAL EXAM:   Physical Exam  Vitals and nursing note reviewed.   Constitutional:       General: She is not in acute distress.     Appearance: Normal appearance.      Comments: Elderly appearing.   HENT:      Head: Normocephalic and atraumatic.      Mouth/Throat:      Mouth: Mucous membranes are moist.   Eyes:      Extraocular Movements: Extraocular movements intact.      Pupils: Pupils are equal, round, and reactive to light.      Comments: No vertical or bidirectional nystagmus   Cardiovascular:      Rate and Rhythm: Normal rate and regular rhythm.   Pulmonary:      Effort: Pulmonary effort is normal. No respiratory distress.      Breath sounds: Normal breath sounds.   Abdominal:      General: There is no distension.      Palpations: Abdomen is soft.      Tenderness: There is no abdominal tenderness.   Musculoskeletal:         General: Normal range of motion.      Cervical back: Normal range of motion.   Skin:     General: Skin is warm.      Capillary Refill: Capillary refill takes less than 2 seconds.   Neurological:      Mental Status: She is alert.      Cranial Nerves: Cranial nerves are intact.      Sensory: Sensation is intact.      Motor: Motor function is intact.      Comments: Fluent speech, no facial asymmetry or pronator drift, 5/5 strength b/l  UE/LE, normal finger to nose b/l         Results     LAB:  All pertinent labs reviewed and interpreted  Labs Ordered and Resulted from Time of ED Arrival to Time of ED Departure   COMPREHENSIVE METABOLIC PANEL - Abnormal       Result Value    Sodium 134 (*)     Potassium 3.5      Chloride 99      Carbon Dioxide (CO2) 29      Anion Gap 6      Urea Nitrogen 16      Creatinine 0.94      Calcium 9.4      Glucose 150 (*)     Alkaline Phosphatase 55      AST 16      ALT 12      Protein Total 6.7      Albumin 3.9      Bilirubin Total 0.7      GFR Estimate 53 (*)    ROUTINE UA WITH MICROSCOPIC REFLEX TO CULTURE - Abnormal    Color Urine Yellow      Appearance Urine Turbid (*)     Glucose Urine Negative      Bilirubin Urine Negative      Ketones Urine Negative      Specific Gravity Urine 1.022      Blood Urine 0.06 mg/dL (*)     pH Urine 6.0      Protein Albumin Urine 100  (*)     Urobilinogen Urine <2.0      Nitrite Urine Negative      Leukocyte Esterase Urine 500 Claudette/uL (*)     Bacteria Urine Many (*)     Mucus Urine Present (*)     RBC Urine 2      WBC Urine >182 (*)     Squamous Epithelials Urine <1     MAGNESIUM - Normal    Magnesium 1.8     TROPONIN I - Normal    Troponin I 0.03     COVID-19 VIRUS (CORONAVIRUS) BY PCR - Normal    SARS CoV2 PCR Negative     CBC WITH PLATELETS AND DIFFERENTIAL    WBC Count 6.5      RBC Count 4.40      Hemoglobin 13.4      Hematocrit 40.2      MCV 91      MCH 30.5      MCHC 33.3      RDW 13.0      Platelet Count 269      % Neutrophils 63      % Lymphocytes 25      % Monocytes 9      % Eosinophils 2      % Basophils 1      % Immature Granulocytes 0      NRBCs per 100 WBC 0      Absolute Neutrophils 4.1      Absolute Lymphocytes 1.6      Absolute Monocytes 0.6      Absolute Eosinophils 0.2      Absolute Basophils 0.0      Absolute Immature Granulocytes 0.0      Absolute NRBCs 0.0     URINE CULTURE       RADIOLOGY:  XR Chest 1 View   Final Result   IMPRESSION:       Lungs are symmetrically  inflated. No lung edema or, consolidation, or focal volume loss.      Cardiac silhouette is normal in size. Vascular pedicle width is normal.      No pleural fluid or pneumothorax.      Bilateral glenohumeral osteoarthrosis. Mild thoracic spine degenerative osteophytes.      CT Cervical Spine w/o Contrast   Final Result   IMPRESSION:   1.  Cervical spondylosis. Negative for fracture or traumatic malalignment.      CT Head w/o Contrast   Final Result   IMPRESSION:   1.  No CT evidence for acute intracranial process.   2.  Brain atrophy and presumed chronic microvascular ischemic changes as above.                   ECG:  NSR, rate 80, 1st degree AV block, no acute ischemia    I have independently reviewed and interpreted the EKG(s) documented above     PROCEDURES:  Procedures:  none      FINAL IMPRESSION:    ICD-10-CM    1. Urinary tract infection without hematuria, site unspecified  N39.0    2. Falls frequently  R29.6    3. Impaired mobility and ADLs  Z74.09     Z78.9        0 minutes of critical care time      I, Thao Xie, am serving as a scribe to document services personally performed by Dr. Jose G Ferguson, based on my observations and the provider's statements to me. I, Jose G Ferguson, DO attest that Thao Xie is acting in a scribe capacity, has observed my performance of the services and has documented them in accordance with my direction.      Jose G Ferguson DO  Emergency Medicine  [facility] EMERGENCY DEPARTMENT  11/22/2021  3:47 PM        Jose G Ferguson MD  11/22/21 2102

## 2021-11-22 NOTE — ED NOTES
Bed: JNED-16  Expected date: 11/22/21  Expected time: 3:20 PM  Means of arrival: Ambulance  Comments:  [place]- 89 yo F coming from Nursing Home, ? UTI

## 2021-11-22 NOTE — ED TRIAGE NOTES
Pt brought in by [facility].  Pt comes from an assisted living facility where she's recently had increased weakness, lightheadedness, and increased confusion.  Pt's caretaker states the pt gets frequent UTIs.

## 2021-11-23 ENCOUNTER — APPOINTMENT (OUTPATIENT)
Dept: PHYSICAL THERAPY | Facility: HOSPITAL | Age: 86
DRG: 689 | End: 2021-11-23
Attending: FAMILY MEDICINE
Payer: MEDICARE

## 2021-11-23 ENCOUNTER — APPOINTMENT (OUTPATIENT)
Dept: OCCUPATIONAL THERAPY | Facility: HOSPITAL | Age: 86
DRG: 689 | End: 2021-11-23
Attending: FAMILY MEDICINE
Payer: MEDICARE

## 2021-11-23 LAB
ANION GAP SERPL CALCULATED.3IONS-SCNC: 7 MMOL/L (ref 5–18)
ATRIAL RATE - MUSE: 80 BPM
BUN SERPL-MCNC: 15 MG/DL (ref 8–28)
CALCIUM SERPL-MCNC: 9.1 MG/DL (ref 8.5–10.5)
CHLORIDE BLD-SCNC: 101 MMOL/L (ref 98–107)
CO2 SERPL-SCNC: 27 MMOL/L (ref 22–31)
CREAT SERPL-MCNC: 0.79 MG/DL (ref 0.6–1.1)
DIASTOLIC BLOOD PRESSURE - MUSE: 79 MMHG
ERYTHROCYTE [DISTWIDTH] IN BLOOD BY AUTOMATED COUNT: 13 % (ref 10–15)
GFR SERPL CREATININE-BSD FRML MDRD: 65 ML/MIN/1.73M2
GLUCOSE BLD-MCNC: 99 MG/DL (ref 70–125)
HCT VFR BLD AUTO: 34.6 % (ref 35–47)
HGB BLD-MCNC: 11.5 G/DL (ref 11.7–15.7)
INTERPRETATION ECG - MUSE: NORMAL
MCH RBC QN AUTO: 30.4 PG (ref 26.5–33)
MCHC RBC AUTO-ENTMCNC: 33.2 G/DL (ref 31.5–36.5)
MCV RBC AUTO: 92 FL (ref 78–100)
P AXIS - MUSE: NORMAL DEGREES
PLATELET # BLD AUTO: 210 10E3/UL (ref 150–450)
POTASSIUM BLD-SCNC: 3.5 MMOL/L (ref 3.5–5)
PR INTERVAL - MUSE: 224 MS
QRS DURATION - MUSE: 82 MS
QT - MUSE: 418 MS
QTC - MUSE: 482 MS
R AXIS - MUSE: -13 DEGREES
RBC # BLD AUTO: 3.78 10E6/UL (ref 3.8–5.2)
SODIUM SERPL-SCNC: 135 MMOL/L (ref 136–145)
SYSTOLIC BLOOD PRESSURE - MUSE: 170 MMHG
T AXIS - MUSE: 267 DEGREES
TSH SERPL DL<=0.005 MIU/L-ACNC: 2 UIU/ML (ref 0.3–5)
VENTRICULAR RATE- MUSE: 80 BPM
WBC # BLD AUTO: 4.9 10E3/UL (ref 4–11)

## 2021-11-23 PROCEDURE — G0378 HOSPITAL OBSERVATION PER HR: HCPCS

## 2021-11-23 PROCEDURE — 99233 SBSQ HOSP IP/OBS HIGH 50: CPT | Performed by: FAMILY MEDICINE

## 2021-11-23 PROCEDURE — 250N000013 HC RX MED GY IP 250 OP 250 PS 637: Performed by: EMERGENCY MEDICINE

## 2021-11-23 PROCEDURE — 97530 THERAPEUTIC ACTIVITIES: CPT | Mod: GP

## 2021-11-23 PROCEDURE — 97162 PT EVAL MOD COMPLEX 30 MIN: CPT | Mod: GP

## 2021-11-23 PROCEDURE — 96375 TX/PRO/DX INJ NEW DRUG ADDON: CPT

## 2021-11-23 PROCEDURE — 36415 COLL VENOUS BLD VENIPUNCTURE: CPT | Performed by: FAMILY MEDICINE

## 2021-11-23 PROCEDURE — 250N000013 HC RX MED GY IP 250 OP 250 PS 637: Performed by: FAMILY MEDICINE

## 2021-11-23 PROCEDURE — 96376 TX/PRO/DX INJ SAME DRUG ADON: CPT

## 2021-11-23 PROCEDURE — 85027 COMPLETE CBC AUTOMATED: CPT | Performed by: FAMILY MEDICINE

## 2021-11-23 PROCEDURE — 97535 SELF CARE MNGMENT TRAINING: CPT | Mod: GO

## 2021-11-23 PROCEDURE — 80048 BASIC METABOLIC PNL TOTAL CA: CPT | Performed by: FAMILY MEDICINE

## 2021-11-23 PROCEDURE — 250N000011 HC RX IP 250 OP 636: Performed by: FAMILY MEDICINE

## 2021-11-23 PROCEDURE — 97166 OT EVAL MOD COMPLEX 45 MIN: CPT | Mod: GO

## 2021-11-23 PROCEDURE — 97116 GAIT TRAINING THERAPY: CPT | Mod: GP

## 2021-11-23 PROCEDURE — 84443 ASSAY THYROID STIM HORMONE: CPT | Performed by: FAMILY MEDICINE

## 2021-11-23 RX ORDER — LOSARTAN POTASSIUM 50 MG/1
100 TABLET ORAL
Status: DISCONTINUED | OUTPATIENT
Start: 2021-11-23 | End: 2021-11-26 | Stop reason: HOSPADM

## 2021-11-23 RX ORDER — LOSARTAN POTASSIUM 50 MG/1
100 TABLET ORAL
Status: DISCONTINUED | OUTPATIENT
Start: 2021-11-24 | End: 2021-11-23

## 2021-11-23 RX ADMIN — CIPROFLOXACIN 400 MG: 2 INJECTION, SOLUTION INTRAVENOUS at 21:49

## 2021-11-23 RX ADMIN — HYDRALAZINE HYDROCHLORIDE 12.5 MG: 25 TABLET, FILM COATED ORAL at 08:55

## 2021-11-23 RX ADMIN — ACETAMINOPHEN 975 MG: 325 TABLET ORAL at 08:56

## 2021-11-23 RX ADMIN — LORAZEPAM 0.25 MG: 0.5 TABLET ORAL at 16:00

## 2021-11-23 RX ADMIN — LOSARTAN POTASSIUM 100 MG: 50 TABLET, FILM COATED ORAL at 16:00

## 2021-11-23 RX ADMIN — ACETAMINOPHEN 975 MG: 325 TABLET ORAL at 20:37

## 2021-11-23 RX ADMIN — Medication 1 TABLET: at 08:59

## 2021-11-23 RX ADMIN — CIPROFLOXACIN 400 MG: 2 INJECTION, SOLUTION INTRAVENOUS at 10:03

## 2021-11-23 RX ADMIN — Medication 2000 UNITS: at 14:06

## 2021-11-23 RX ADMIN — ONDANSETRON 4 MG: 2 INJECTION INTRAMUSCULAR; INTRAVENOUS at 09:53

## 2021-11-23 RX ADMIN — LEVOTHYROXINE SODIUM 50 MCG: 0.03 TABLET ORAL at 08:51

## 2021-11-23 RX ADMIN — CYANOCOBALAMIN TAB 1000 MCG 1000 MCG: 1000 TAB at 14:06

## 2021-11-23 RX ADMIN — Medication 1 TABLET: at 16:05

## 2021-11-23 RX ADMIN — FAMOTIDINE 20 MG: 20 TABLET, FILM COATED ORAL at 08:55

## 2021-11-23 RX ADMIN — ASPIRIN 81 MG: 81 TABLET, COATED ORAL at 16:05

## 2021-11-23 RX ADMIN — BENZONATATE 200 MG: 100 CAPSULE ORAL at 11:54

## 2021-11-23 ASSESSMENT — MIFFLIN-ST. JEOR: SCORE: 1074.04

## 2021-11-23 NOTE — PHARMACY-ADMISSION MEDICATION HISTORY
Pharmacy Note - Admission Medication History    Pertinent Provider Information: Caregiver does not plan to bring medical cannabis to the hospital for inpatient use.        ______________________________________________________________________    Prior To Admission (PTA) med list completed and updated in EMR.       PTA Med List   Medication Sig Last Dose     acetaminophen (TYLENOL) 500 MG tablet Take 1,000 mg by mouth 2 times daily 11/22/2021 at am     albuterol (PROAIR HFA/PROVENTIL HFA/VENTOLIN HFA) 108 (90 Base) MCG/ACT inhaler Inhale 1-2 puffs into the lungs every 4 hours as needed      aspirin (ASA) 81 MG EC tablet Take 81 mg by mouth daily (with dinner)  11/21/2021 at pm     benzonatate (TESSALON) 200 MG capsule Take 200 mg by mouth 2 times daily  11/22/2021 at am     cholecalciferol 25 MCG (1000 UT) TABS Take 2,000 Units by mouth daily (with lunch)  11/22/2021 at Unknown time     DULoxetine (CYMBALTA) 60 MG capsule Take 60 mg by mouth At Bedtime 11/21/2021 at pm     famotidine (PEPCID) 20 MG tablet Take 20 mg by mouth 2 times daily With lunch and at bedtime. 11/22/2021 at 1200     hydrALAZINE (APRESOLINE) 25 MG tablet Take 12.5 mg by mouth 2 times daily With lunch and at bedtime. 11/22/2021 at 1200     hydrOXYzine (ATARAX) 25 MG tablet Take 25 mg by mouth At Bedtime 11/21/2021 at Unknown time     ibuprofen (ADVIL/MOTRIN) 200 MG tablet Take 400 mg by mouth 3 times daily  11/22/2021 at x2     Lactobacillus (PROBIOTIC ACIDOPHILUS PO) Take 1 capsule by mouth daily (with dinner)  11/21/2021 at Unknown time     levothyroxine (SYNTHROID/LEVOTHROID) 50 MCG tablet Take 50 mcg by mouth daily before breakfast  11/22/2021 at am     loperamide (IMODIUM) 2 MG capsule Take 2 mg by mouth 4 times daily as needed      losartan (COZAAR) 100 MG tablet Take 100 mg by mouth daily (with lunch) 11/22/2021 at Unknown time     medical cannabis (Patient's own supply) Take 1 Dose by mouth See Admin Instructions (The purpose of this order  is to document that the patient reports taking medical cannabis.  This is not a prescription, and is not used to certify that the patient has a qualifying medical condition.)    Tangerine capsule  Take 1 capsule in the morning and 1 hour prior to sleep with milk    Emulsion with supper 11/22/2021 at x1     Multiple Vitamins-Minerals (CENTRUM SILVER 50+WOMEN PO) Take 1 tablet by mouth daily (with dinner)  11/21/2021 at Unknown time     Multiple Vitamins-Minerals (PRESERVISION AREDS) CAPS Take 1 capsule by mouth 2 times daily (with meals) With lunch and supper. 11/22/2021 at 1200     polyethylene glycol (MIRALAX) 17 GM/Dose powder Take 1 capful by mouth 2 times daily as needed for constipation      polyethylene glycol-propylene glycol (SYSTANE) 0.4-0.3 % SOLN ophthalmic solution Place 2 drops into both eyes 3 times daily as needed      vitamin B-12 (CYANOCOBALAMIN) 1000 MCG tablet Take 1,000 mcg by mouth daily (with lunch) 11/22/2021 at Unknown time       Information source(s): Caregiver  Method of interview communication: in-person    Summary of Changes to PTA Med List  New: Miralax  Discontinued: magnesium chloride  Changed: aspirin, benzonatate, B12, ibuprofen, lactobacillus    Patient was asked about OTC/herbal products specifically.  PTA med list reflects this.    In the past week, patient estimated taking medication this percent of the time:  greater than 90%.    Allergies were reviewed, assessed, and updated with the patient.      Patient did not bring any medications to the hospital and can't retrieve from home. No multi-dose medications are available for use during hospital stay.     The information provided in this note is only as accurate as the sources available at the time of the update(s).    Thank you for the opportunity to participate in the care of this patient.    Ina Farr [facility]  11/22/2021 7:22 PM

## 2021-11-23 NOTE — PROGRESS NOTES
"   11/23/21 0901   Quick Adds   Quick Adds Certification   Type of Visit Initial Occupational Therapy Evaluation   Living Environment   People in home alone   Current Living Arrangements independent living facility  (was planning to move to [facility] in future  )   Living Environment Comments stated has caregiver daily-pt unable to state how many hrs,     Self-Care   Current Activity Tolerance fair   Equipment Currently Used at Home walker, rolling;commode chair;shower chair  (walk in shower)   Instrumental Activities of Daily Living (IADL)   IADL Comments A w/ ADLs from daily caregiver Aliya    Disability/Function   Hearing Difficulty or Deaf no   Wear Glasses or Blind yes   Vision Management glasses   Difficulty Communicating no   Walking or Climbing Stairs Difficulty yes   Walking or Climbing Stairs ambulation difficulty, requires equipment   Dressing/Bathing Difficulty yes   Dressing/Bathing bathing difficulty, assistance 1 person;dressing difficulty, assistance 1 person   Doing Errands Independently Difficulty (such as shopping) no   Fall history within last six months yes   General Information   Onset of Illness/Injury or Date of Surgery 11/22/21   Patient/Family Therapy Goal Statement (OT) none stated    Existing Precautions/Restrictions fall   Cognitive Status Examination   Orientation Status person;place;time   Cognitive Status Comments hesitant w/ answers, stated feels \"off\",    Visual Perception   Visual Impairment/Limitations corrective lenses full-time   Range of Motion Comprehensive   General Range of Motion   (limited AROM generalized)   Strength Comprehensive (MMT)   General Manual Muscle Testing (MMT) Assessment   (generalized weakness)   Coordination   Upper Extremity Coordination No deficits were identified   Bed Mobility   Bed Mobility supine-sit   Supine-Sit [place] (Bed Mobility) minimum assist (75% patient effort)   Transfers   Transfers bed-chair transfer;other (see comments)  (commode) "   Transfer Skill: Bed to Chair/Chair to Bed   Bed-Chair [place] (Transfers) minimum assist (75% patient effort)   Assistive Device (Bed-Chair Transfers) rolling walker   Transfer Comments commode min A w/ FWW   Balance   Balance Assessment standing balance: static   Standing Balance: Static fair balance   Toileting   [place] Level (Toileting) maximum assist (25% patient effort)   Assistive Devices (Toileting) commode chair   Position (Toileting) supported sitting   Clinical Impression   Criteria for Skilled Therapeutic Interventions Met (OT) yes   OT Diagnosis decreased ADLs   OT Problem List-Impairments impacting ADL balance;cognition;vision;strength;mobility   Assessment of Occupational Performance 3-5 Performance Deficits   Identified Performance Deficits drsg, toileting, cognition, g.h   Planned Therapy Interventions (OT) ADL retraining;cognition;transfer training   Clinical Decision Making Complexity (OT) moderate complexity   Therapy Frequency (OT) Daily   Predicted Duration of Therapy 6 days   Anticipated Equipment Needs Upon Discharge (OT)   (cont. to assess)   Risk & Benefits of therapy have been explained patient   OT Discharge Planning    OT Discharge Recommendation (DC Rec) Transitional Care Facility;home with home care occupational therapy   OT Rationale for DC Rec unsure if 24 hr A available, if d/c home rec. 24 hr assist w/ caregiver w/ ADLs, transfers    Therapy Certification   Start of Care Date 11/23/21   Certification date from 11/23/21   Certification date to 12/23/21   Medical Diagnosis falls   Total Evaluation Time (Minutes)   Total Evaluation Time (Minutes) 5

## 2021-11-23 NOTE — PROGRESS NOTES
PRIMARY DIAGNOSIS: GENERALIZED WEAKNESS    OUTPATIENT/OBSERVATION GOALS TO BE MET BEFORE DISCHARGE  1. Orthostatic performed: N/A    2. Tolerating PO medications: Yes    3. Return to near baseline physical activity: No    4. Cleared for discharge by consultants (if involved): No    Discharge Planner Nurse   Safe discharge environment identified: No  Barriers to discharge: Yes- urine culture pending. Placement.       Entered by: Alaina Rudolph 11/23/2021 5:36 AM     Please review provider order for any additional goals.   Nurse to notify provider when observation goals have been met and patient is ready for discharge.

## 2021-11-23 NOTE — PLAN OF CARE
"PRIMARY DIAGNOSIS: \"GENERIC\" NURSING  OUTPATIENT/OBSERVATION GOALS TO BE MET BEFORE DISCHARGE:  1. ADLs back to baseline: No    2. Activity and level of assistance: Up with maximum assistance. Consider SW and/or PT evaluation.     3. Pain status: Improved-controlled with oral pain medications.    4. Return to near baseline physical activity: No     Discharge Planner Nurse   Safe discharge environment identified: Yes  Barriers to discharge: Yes Patient got nauseous after working with therapy this morning and dry heaved. Antiemetic given       Entered by: Lindsey Degroot 11/23/2021 2:19 PM     Please review provider order for any additional goals.   Nurse to notify provider when observation goals have been met and patient is ready for discharge.  "

## 2021-11-23 NOTE — CONSULTS
Care Management Initial Consult    General Information  Assessment completed with: Patient,Caregiver, pt and Aliya Carolina  Type of CM/SW Visit: CM Role Introduction    Primary Care Provider verified and updated as needed: Yes   Readmission within the last 30 days: no previous admission in last 30 days   Return Category: Progression of disease  Reason for Consult: discharge planning  Advance Care Planning: Advance Care Planning Reviewed: no concerns identified     General Information Comments: hard of hearing    Communication Assessment  Patient's communication style: spoken language (English or Bilingual)             Cognitive  Cognitive/Neuro/Behavioral: WDL                      Living Environment:   People in home: alone     Current living Arrangements: assisted living,independent living facility  Name of Facility: [facility]    Able to return to prior arrangements: yes       Family/Social Support:  Care provided by: homecare agency  Provides care for: no one  Marital Status:   Children          Description of Support System: Supportive    Support Assessment: Adequate family and caregiver support    Current Resources:   Patient receiving home care services: Yes  Skilled Home Care Services: Home Health Aid ([facility])  Community Resources: DME  Equipment currently used at home: wheelchair, manual,walker, rolling  Supplies currently used at home: None    Employment/Financial:  Employment Status:          Financial Concerns: No concerns identified   Referral to Financial Counselor: No       Lifestyle & Psychosocial Needs:  Social Determinants of Health     Tobacco Use: Low Risk      Smoking Tobacco Use: Never Smoker     Smokeless Tobacco Use: Never Used   Alcohol Use: Not on file   Financial Resource Strain: Not on file   Food Insecurity: Not on file   Transportation Needs: Not on file   Physical Activity: Not on file   Stress: Not on file   Social Connections: Not on file   Intimate  Partner Violence: Not on file   Depression: Not on file   Housing Stability: Not on file       Functional Status:  Prior to admission patient needed assistance:   Dependent ADLs:: Ambulation-walker,Bathing,Dressing  Dependent IADLs:: Medication Management,Transportation,Meal Preparation,Shopping,Laundry,Cooking,Cleaning  Assesssment of Functional Status: Not at baseline with ADL Functioning,Not at baseline with mobility,Not at  functional baseline    Mental Health Status:  Mental Health Status: No Current Concerns       Chemical Dependency Status:                Values/Beliefs:  Spiritual, Cultural Beliefs, Denominational Practices, Values that affect care:                 Additional Information:  RYAN assessed, lives alone at [facility] and plans to move to [facility] in Jan 2022. Caregiver Aliya from [facility] is present and helpful. Alison Ayala to transport at discharge. Discussed [facility].      Candice Adair RN

## 2021-11-23 NOTE — PROGRESS NOTES
Chart reviewed. OT/PT evaluation/recommendations pending.    1100 OT recommendations; TCU; home with home therapy with 24 hour assist. PT recommendations pending.    Left message for tommy Porter to introduce care management role, progression of care, discuss OT recommendations and possible services at discharge. RNCM information provided, requesting call back.

## 2021-11-23 NOTE — ED NOTES
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     Osteoarthritis      TIA (transient ischemic attack)      UTI (urinary tract infection) 9/19/2016     Vitamin B12 deficiency (non anemic) 3/11/2021    Overview:  getting monthly injections        Code Status:  Prior     Falls Risk: Yes Band: Applied    Current Living Situation/Residence: lives in an assisted living facility     Elimination Status: Continent: No     Activity Level: SBA w/ walker    Patients Preferred Language:  English     Needed: No    Vital Signs:  BP (!) 170/79   Pulse 80   Temp 98.3  F (36.8  C) (Oral)   Resp 26   Wt 73.3 kg (161 lb 9.6 oz)   SpO2 97%   BMI 29.56 kg/m       Cardiac Rhythm: NSR    Pain Score: 10/10    Is the Patient Confused:  Yes    Last Food or Drink: 11/22/21 at 12:15pm    Focused Assessment: Neuro, cardiac    Tests Performed: Done: Labs and Imaging    Treatments Provided:  IV fluids    Family Dynamics/Concerns: No    Family Updated On Visitor Policy: No    Plan of Care Communicated to Family: No    Who Was Updated about Plan of Care: Pt's caretaker    Belongings Checklist Done and Signed by Patient: Yes    Covid: asymptomatic , negative    Additional Information: N/A    RN: Fred Massey RN  11/22/2021 6:57 PM

## 2021-11-23 NOTE — PROGRESS NOTES
PRIMARY DIAGNOSIS: GENERALIZED WEAKNESS    OUTPATIENT/OBSERVATION GOALS TO BE MET BEFORE DISCHARGE  1. Orthostatic performed: No    2. Tolerating PO medications: Yes    3. Return to near baseline physical activity: No    4. Cleared for discharge by consultants (if involved): No    Discharge Planner Nurse   Safe discharge environment identified: No  Barriers to discharge: Yes- urine culture pending. Placement.        Entered by: Alaina Rudolph 11/23/2021 5:42 AM     Please review provider order for any additional goals.   Nurse to notify provider when observation goals have been met and patient is ready for discharge.

## 2021-11-23 NOTE — PROGRESS NOTES
[facility]      OUTPATIENT PHYSICAL THERAPY EVALUATION  PLAN OF TREATMENT FOR OUTPATIENT REHABILITATION  (COMPLETE FOR INITIAL CLAIMS ONLY)  Patient's Last Name, First Name, M.I.  YOB: 1929  Ani Porter                        Provider's Name  [facility] Medical Record No.  [id number]                               Onset Date:  11/22/21   Start of Care Date:  (P) 11/23/21      Type:     _X_PT   ___OT   ___SLP Medical Diagnosis:  (P) Frequent falls.                        PT Diagnosis:  (P) Impaired functional mobility   Visits from SOC:  1   _________________________________________________________________________________  Plan of Treatment/Functional Goals    Planned Interventions: (P) bed mobility training,gait training,strengthening,transfer training     Goals: See Physical Therapy Goals on Care Plan in [facility] electronic health record.    Therapy Frequency: (P) Daily  Predicted Duration of Therapy Intervention: (P) 7 days  _________________________________________________________________________________    I CERTIFY THE NEED FOR THESE SERVICES FURNISHED UNDER        THIS PLAN OF TREATMENT AND WHILE UNDER MY CARE     (Physician co-signature of this document indicates review and certification of the therapy plan).                Certification date from: (P) 11/23/21, Certification date to: (P) 12/22/21    Referring Physician: (P) Dr Matthew Simpson            Initial Assessment        See Physical Therapy evaluation dated (P) 11/23/21 in Epic electronic health record.

## 2021-11-23 NOTE — PLAN OF CARE
Problem: Adult Inpatient Plan of Care  Goal: Optimal Comfort and Wellbeing  Outcome: No Change   Patient rating mid back pain #5, Ibuprofen given X1.      Problem: UTI (Urinary Tract Infection)  Goal: Improved Infection Symptoms  Outcome: No Change   Patient incontinent of urine, external catheter in place.      Problem: Confusion Chronic  Goal: Optimal Cognitive Function  Outcome: No Change   Patient alert and oriented, forgetful, limited vision and hard of hearing.      Problem: Adult Inpatient Plan of Care  Goal: Readiness for Transition of Care  Outcome: No Change  Intervention: Mutually Develop Transition Plan  Recent Flowsheet Documentation  Taken 11/22/2021 215 by Meredith Simental, RN  Equipment Currently Used at Home:   wheelchair, manual   walker, rolling  Patient ambulated to the bathroom with walker, gait belt and assist of one.    Meredith Simental, RN

## 2021-11-23 NOTE — PROGRESS NOTES
11/23/21 1330   Quick Adds   Quick Adds Certification   Type of Visit Initial PT Evaluation   Living Environment   People in home alone   Current Living Arrangements independent living facility  (Moving to [facility] 12/30/21.)   Home Accessibility no concerns   Living Environment Comments Per personal caregiver that was present during eval, pt has caregiver during the day but not evenings/overnights. Plan is for pt to move to [facility] 12/30/21.   Self-Care   Usual Activity Tolerance moderate   Current Activity Tolerance fair   Equipment Currently Used at Home walker, rolling  (4WW)   Activity/Exercise/Self-Care Comment Prior to getting sick pt has been fairly independent with mobililty in her apt using 4WW per personal caregiver that was present during eval.   Disability/Function   Fall history within last six months yes   General Information   Onset of Illness/Injury or Date of Surgery 11/22/21   Referring Physician Dr Matthew Simpson   Patient/Family Therapy Goals Statement (PT) Stronger, no more falls.   Pertinent History of Current Problem (include personal factors and/or comorbidities that impact the POC) Admit for falls at home.   Existing Precautions/Restrictions fall  (bed/chair alarm)   Pain Assessment   Patient Currently in Pain Yes, see Vital Sign flowsheet  (L upper arm/chest area. Thinks its from being lifted by EMT)   Strength   Strength Comments BLE generalized weakness grossly throughout.   Bed Mobility   Comment (Bed Mobility) Supine>sit min A.   Transfers   Transfer Safety Comments Sit<>stand from edge of bed to FWW mod A.   Gait/Stairs (Locomotion)   [place] Level (Gait) moderate assist (50% patient effort)   Assistive Device (Gait) walker, front-wheeled   Distance in Feet (Required for LE Total Joints) 100'x1   Pattern (Gait) step-to   Deviations/Abnormal Patterns (Gait) antalgic;patric decreased;festinating/shuffling;gait speed decreased;stride length decreased;weight shifting decreased   Comment  (Gait/Stairs) Slow pace, unsteady at times.   Clinical Impression   Criteria for Skilled Therapeutic Intervention yes, treatment indicated   PT Diagnosis (PT) Impaired functional mobility   Influenced by the following impairments weakness, fatigue   Functional limitations due to impairments transfers, gait   Clinical Presentation Stable/Uncomplicated   Clinical Presentation Rationale Presents as medically diagnosed.   Clinical Decision Making (Complexity) low complexity   Therapy Frequency (PT) Daily   Predicted Duration of Therapy Intervention (days/wks) 7 days   Planned Therapy Interventions (PT) bed mobility training;gait training;strengthening;transfer training   Anticipated Equipment Needs at Discharge (PT) walker, rolling  (Has 4WW at home)   Risk & Benefits of therapy have been explained evaluation/treatment results reviewed;care plan/treatment goals reviewed;risks/benefits reviewed;participants included;patient;caregiver   PT Discharge Planning    PT Discharge Recommendation (DC Rec) Transitional Care Facility;home with home care physical therapy   PT Rationale for DC Rec Recommend TCU due to pt requiring mod A with mobility at this time. If pt returns home will require 24/7 care.   Therapy Certification   Start of care date 11/23/21   Certification date from 11/23/21   Certification date to 12/22/21   Medical Diagnosis Frequent falls.   Total Evaluation Time   Total Evaluation Time (Minutes) 10   Coping Strategies   Trust Relationship/Rapport care explained;questions answered;questions encouraged

## 2021-11-23 NOTE — PROGRESS NOTES
[facility] PROGRESS NOTE      ADMIT DATE: 11/22/2021     FACILITY: [facility]    PCP: Archana Patel, [phone]    ASSESSMENT AND PLAN:      Patient is a 89-year-old female with a history of TIA, mild dementia, heart failure, recurrent UTIs, hypertension, hypothyroidism, urinary incontinence that presents with weakness and lightheadedness.      Active Problems:    Impaired mobility and ADLs    Falls frequently         Recurrent UTI  -abnormal urinalysis, confusion  -Started on ciprofloxacin 40mg IV x 3 days due to resistances in the past.  Last UTI 8/23/2021 growing Klebsiella pneumonia susceptible to ceftriaxone, ciprofloxacin and meropenem. Has cephalosporin allergy. QTC not significantly prolonged with QTc of 482 ms.   -C/w IV cipro  -urine culture pending  -consider repeating EKG on 11/24.   -patient sees urologist at . Last urology appointment was 6/11/2021. Will have urology see her here.        Weakness  -PT/OT---recommending TCU  - consulted     Hypothyroidism-continue Synthroid 50 mcg daily.  Recheck TSH     Hypertension-BP still elevated. C/w home hydralazine 12.5 mg twice daily. Creatinine is WNL-->restarted home losartan on 11/23.      Acute encephalopathy on top of Dementia  -seems pretty confused. Possibly acute infectious encephalopathy due to UTI on dementia. I would be concerned with her living without 24hr supervision if this is her baseline.  -monitor for acute hospital delirium       GERD  -c/w home pepcid    Mood disorder  -c/w home cymbalta  -c/w home atarax for now      FEN/GI: low salt diet  DVT proph: SCDs  Code status: No CPR- Do NOT Intubate      Updated tommy Porter on current plan.       Discharge barriers:  Urine culture pending, Acute encephalopathy, urology consult   -ADOD: 1-2 days      SUBJECTIVE:    Patient denies any complaints at this time.     ROS:  12 Points review of systems reviewed and is negative except for what has already been  "mentioned above    OBJECTIVE:  Patient Vitals for the past 24 hrs:   BP Temp Temp src Pulse Resp SpO2 Height Weight   11/23/21 0521 (!) 168/72 97.8  F (36.6  C) Oral 70 18 96 % -- --   11/22/21 2339 (!) 170/74 97.9  F (36.6  C) Oral 69 18 95 % -- --   11/22/21 2100 (!) 176/77 98.4  F (36.9  C) Oral 67 20 97 % 1.575 m (5' 2\") 69.5 kg (153 lb 3.2 oz)   11/22/21 1900 (!) 165/77 -- -- 67 22 95 % -- --   11/22/21 1634 (!) 170/79 -- -- 80 26 97 % -- --   11/22/21 1615 137/81 -- -- 81 22 96 % -- --   11/22/21 1545 (!) 149/71 -- -- 84 17 96 % -- --   11/22/21 1540 (!) 156/72 98.3  F (36.8  C) Oral 85 16 97 % -- 73.3 kg (161 lb 9.6 oz)          Intake/Output Summary (Last 24 hours) at 11/23/2021 0740  Last data filed at 11/23/2021 0530  Gross per 24 hour   Intake --   Output 250 ml   Net -250 ml     GENRL: Not in acute distress. Satting at 95% on room air.   HEENT: NC/AT      Neck- supple      Sclera- anicteric      Mucous membrane- moist and pink  CHEST: Clear to auscultation bilaterally  HEART: S1S2 regular. No murmurs, rubs or gallops  ABDMN: Soft. Non-tender.No guarding or rigidity. Bowel sounds- active. Purewick in place draining yellow-colored urine.   EXTRM:  No pedal edema.   NEURO:  No involuntary movements  INTGM: please see nursing assessment for full skin assessment  PULSES: 2+ and symmetric all extremities  PSYCH: normal speech    DIAGNOSTIC DATA:          Recent Results (from the past 24 hour(s))   Comprehensive metabolic panel    Collection Time: 11/22/21  4:34 PM   Result Value Ref Range    Sodium 134 (L) 136 - 145 mmol/L    Potassium 3.5 3.5 - 5.0 mmol/L    Chloride 99 98 - 107 mmol/L    Carbon Dioxide (CO2) 29 22 - 31 mmol/L    Anion Gap 6 5 - 18 mmol/L    Urea Nitrogen 16 8 - 28 mg/dL    Creatinine 0.94 0.60 - 1.10 mg/dL    Calcium 9.4 8.5 - 10.5 mg/dL    Glucose 150 (H) 70 - 125 mg/dL    Alkaline Phosphatase 55 45 - 120 U/L    AST 16 0 - 40 U/L    ALT 12 0 - 45 U/L    Protein Total 6.7 6.0 - 8.0 g/dL    " Albumin 3.9 3.5 - 5.0 g/dL    Bilirubin Total 0.7 0.0 - 1.0 mg/dL    GFR Estimate 53 (L) >60 mL/min/1.73m2   Magnesium    Collection Time: 11/22/21  4:34 PM   Result Value Ref Range    Magnesium 1.8 1.8 - 2.6 mg/dL   Troponin I    Collection Time: 11/22/21  4:34 PM   Result Value Ref Range    Troponin I 0.03 0.00 - 0.29 ng/mL   CBC with platelets and differential    Collection Time: 11/22/21  4:34 PM   Result Value Ref Range    WBC Count 6.5 4.0 - 11.0 10e3/uL    RBC Count 4.40 3.80 - 5.20 10e6/uL    Hemoglobin 13.4 11.7 - 15.7 g/dL    Hematocrit 40.2 35.0 - 47.0 %    MCV 91 78 - 100 fL    MCH 30.5 26.5 - 33.0 pg    MCHC 33.3 31.5 - 36.5 g/dL    RDW 13.0 10.0 - 15.0 %    Platelet Count 269 150 - 450 10e3/uL    % Neutrophils 63 %    % Lymphocytes 25 %    % Monocytes 9 %    % Eosinophils 2 %    % Basophils 1 %    % Immature Granulocytes 0 %    NRBCs per 100 WBC 0 <1 /100    Absolute Neutrophils 4.1 1.6 - 8.3 10e3/uL    Absolute Lymphocytes 1.6 0.8 - 5.3 10e3/uL    Absolute Monocytes 0.6 0.0 - 1.3 10e3/uL    Absolute Eosinophils 0.2 0.0 - 0.7 10e3/uL    Absolute Basophils 0.0 0.0 - 0.2 10e3/uL    Absolute Immature Granulocytes 0.0 <=0.0 10e3/uL    Absolute NRBCs 0.0 10e3/uL   Extra Red Top Tube    Collection Time: 11/22/21  4:34 PM   Result Value Ref Range    Hold Specimen JIC    Asymptomatic COVID-19 Virus (Coronavirus) by PCR Nasopharyngeal    Collection Time: 11/22/21  4:35 PM    Specimen: Nasopharyngeal; Swab   Result Value Ref Range    SARS CoV2 PCR Negative Negative   ECG 12-LEAD WITH MUSE (LHE)    Collection Time: 11/22/21  4:36 PM   Result Value Ref Range    Systolic Blood Pressure 170 mmHg    Diastolic Blood Pressure 79 mmHg    Ventricular Rate 80 BPM    Atrial Rate 80 BPM    PA Interval 224 ms    QRS Duration 82 ms     ms    QTc 482 ms    P Axis  degrees    R AXIS -13 degrees    T Axis 267 degrees    Interpretation ECG       Sinus rhythm with 1st degree A-V block  Nonspecific T wave  abnormality  Abnormal ECG  When compared with ECG of 23-AUG-2021 16:45,  ID interval has increased  Nonspecific T wave abnormality now evident in Anterior leads  QT has lengthened  Confirmed by SEE ED PROVIDER NOTE FOR, ECG INTERPRETATION ([id number]),  JUDI AMAYA ([id number]) on 11/23/2021 7:00:31 AM     UA with Microscopic reflex to Culture    Collection Time: 11/22/21  6:50 PM    Specimen: Urine, Clean Catch   Result Value Ref Range    Color Urine Yellow Colorless, Straw, Light Yellow, Yellow    Appearance Urine Turbid (A) Clear    Glucose Urine Negative Negative mg/dL    Bilirubin Urine Negative Negative    Ketones Urine Negative Negative mg/dL    Specific Gravity Urine 1.022 1.001 - 1.030    Blood Urine 0.06 mg/dL (A) Negative    pH Urine 6.0 5.0 - 7.0    Protein Albumin Urine 100  (A) Negative mg/dL    Urobilinogen Urine <2.0 <2.0 mg/dL    Nitrite Urine Negative Negative    Leukocyte Esterase Urine 500 Claudette/uL (A) Negative    Bacteria Urine Many (A) None Seen /HPF    Mucus Urine Present (A) None Seen /LPF    RBC Urine 2 <=2 /HPF    WBC Urine >182 (H) <=5 /HPF    Squamous Epithelials Urine <1 <=1 /HPF        Results for orders placed or performed during the hospital encounter of 11/22/21   CT Head w/o Contrast    Impression    IMPRESSION:  1.  No CT evidence for acute intracranial process.  2.  Brain atrophy and presumed chronic microvascular ischemic changes as above.   CT Cervical Spine w/o Contrast    Impression    IMPRESSION:  1.  Cervical spondylosis. Negative for fracture or traumatic malalignment.   XR Chest 1 View    Impression    IMPRESSION:     Lungs are symmetrically inflated. No lung edema or, consolidation, or focal volume loss.    Cardiac silhouette is normal in size. Vascular pedicle width is normal.    No pleural fluid or pneumothorax.    Bilateral glenohumeral osteoarthrosis. Mild thoracic spine degenerative osteophytes.          All recent labs reviewed personally  Radiology report reviewed.        The total time spent in preparing this progress note is about 35 minutes, >50% time spent in care co-ordination that includes reviewing labs, images, discussing the plan of care with patient/family, consultants, and .      Celestino Ngo MD.   [facility] Medicine Service   [phone]   Pager [phone]

## 2021-11-23 NOTE — PLAN OF CARE
"PRIMARY DIAGNOSIS: \"GENERIC\" NURSING  OUTPATIENT/OBSERVATION GOALS TO BE MET BEFORE DISCHARGE:  1. ADLs back to baseline: No    2. Activity and level of assistance: Up with maximum assistance. Consider SW and/or PT evaluation.     3. Pain status: Improved-controlled with oral pain medications.    4. Return to near baseline physical activity: No     Discharge Planner Nurse   Safe discharge environment identified: Yes  Barriers to discharge: Yes Patient is very weak. Assist of 1 with walker and gait belt. Patient reports nausea is better. She is receiving IV antibiotics.       Entered by: Lindsey Degroot 11/23/2021 2:39 PM     Please review provider order for any additional goals.   Nurse to notify provider when observation goals have been met and patient is ready for discharge.  "

## 2021-11-23 NOTE — H&P
[facility]    History and Physical - Hospitalist Service       Date of Admission:  11/22/2021    Assessment & Plan      Patient is a 89-year-old female with a history of TIA, mild dementia, heart failure, recurrent UTIs, hypertension, hypothyroidism, urinary incontinence that presents with weakness and lightheadedness.        Recurrent UTI  -abnormal urinalysis, confusion  -Started on ciprofloxacin 40mg IV x 3 days due to resistances in the past.  Last UTI 8/23/2021 growing Klebsiella pneumonia susceptible to ceftriaxone, ciprofloxacin and meropenem. Has cephalosporin allergy. QTC not significantly prolonged.     Weakness  -PT/OT  -, patient says she doesn't want nursing home    Hypothyroidism-continue Synthroid 50 mcg daily.  Recheck TSH    Hypertension-mildly elevated.  Continue losartan 100 mg daily, hydralazine 12.5 mg twice daily    Dementia  -seems pretty confused. Possibly acute infectious encephalopathy due to UTI on dementia. I would be concerned with her living without 24hr supervision if this is her baseline.  -monitor for acute hospital delirium       Diet: Regular Diet Adult  DVT Prophylaxis: Pneumatic Compression Devices  Pantoja Catheter: Not present  Central Lines: None  Code Status: No CPR- Do NOT Intubate            Disposition Plan   Expected discharge: anticipate discharge in 1-2 days      The patient's care was discussed with the Patient.    Matthew Villegas MD  [facility]  Securely message with the Vocera Web Console (learn more here)  Text page via [employer] Paging/Directory        ______________________________________________________________________    Chief Complaint     confusion    History of Present Illness   Ani Porter is a 89-year-old female with a history of TIA, mild dementia, heart failure, recurrent UTIs, hypertension, hypothyroidism, urinary incontinence that presents with weakness and lightheadedness.   "    Patient lives independently at home with caregiver that comes for days a week for the past 5 years.  Patient had a strong urine smell about her and reports patient fell over the weekend a couple times.  Patient is more confused than normal which is usually consistent with her recurrence of UTI.  Patient's been inching towards requiring more care.      Patient is a poor historian and cannot recall her own birthday.  Keeps perseverating on the fact that the pubic looks like a \"weiner.\"  She does say she gets more confused when she has a urinary tract infection.  Denies any burning or fever or chills.  She would like to return home at discharge.    Review of Systems    The 10 point Review of Systems is negative other than noted in the HPI or here.     Past Medical History    I have reviewed this patient's medical history and updated it with pertinent information if needed.   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  Past Surgical History:   Procedure Laterality Date     APPENDECTOMY       ARTHROPLASTY KNEE BILATERAL       BACK SURGERY      CAN'T REMEMBER WHAT THE SURGERY WAS FOR     EYE SURGERY      CAN'T REMEMBER WHAT KIND OF EYE SURGERY     HYSTERECTOMY       OTHER SURGICAL HISTORY      BLADDER STIMULATOR       Social History   I have reviewed this patient's social history and updated it with pertinent " information if needed.  Social History     Tobacco Use     Smoking status: Never Smoker     Smokeless tobacco: Never Used   Substance Use Topics     Alcohol use: Yes     Alcohol/week: 2.5 standard drinks     Comment: Alcoholic Drinks/day: very rarely     Drug use: No       Family History     Not clinically significant    Prior to Admission Medications   Prior to Admission Medications   Prescriptions Last Dose Informant Patient Reported? Taking?   DULoxetine (CYMBALTA) 60 MG capsule 11/21/2021 at pm  Yes Yes   Sig: Take 60 mg by mouth At Bedtime   Lactobacillus (PROBIOTIC ACIDOPHILUS PO) 11/21/2021 at Unknown time  Yes Yes   Sig: Take 1 capsule by mouth daily (with dinner)    Multiple Vitamins-Minerals (CENTRUM SILVER 50+WOMEN PO) 11/21/2021 at Unknown time  Yes Yes   Sig: Take 1 tablet by mouth daily (with dinner)    Multiple Vitamins-Minerals (PRESERVISION AREDS) CAPS 11/22/2021 at 1200  Yes Yes   Sig: Take 1 capsule by mouth 2 times daily (with meals) With lunch and supper.   acetaminophen (TYLENOL) 500 MG tablet 11/22/2021 at am  Yes Yes   Sig: Take 1,000 mg by mouth 2 times daily   albuterol (PROAIR HFA/PROVENTIL HFA/VENTOLIN HFA) 108 (90 Base) MCG/ACT inhaler   Yes Yes   Sig: Inhale 1-2 puffs into the lungs every 4 hours as needed   aspirin (ASA) 81 MG EC tablet 11/21/2021 at pm  Yes Yes   Sig: Take 81 mg by mouth daily (with dinner)    benzonatate (TESSALON) 200 MG capsule 11/22/2021 at am  Yes Yes   Sig: Take 200 mg by mouth 2 times daily    cholecalciferol 25 MCG (1000 UT) TABS 11/22/2021 at Unknown time  Yes Yes   Sig: Take 2,000 Units by mouth daily (with lunch)    famotidine (PEPCID) 20 MG tablet 11/22/2021 at 1200  Yes Yes   Sig: Take 20 mg by mouth 2 times daily With lunch and at bedtime.   hydrALAZINE (APRESOLINE) 25 MG tablet 11/22/2021 at 1200  Yes Yes   Sig: Take 12.5 mg by mouth 2 times daily With lunch and at bedtime.   hydrOXYzine (ATARAX) 25 MG tablet 11/21/2021 at Unknown time  Yes Yes   Sig:  Take 25 mg by mouth At Bedtime   ibuprofen (ADVIL/MOTRIN) 200 MG tablet 11/22/2021 at x2  Yes Yes   Sig: Take 400 mg by mouth 3 times daily    levothyroxine (SYNTHROID/LEVOTHROID) 50 MCG tablet 11/22/2021 at am  Yes Yes   Sig: Take 50 mcg by mouth daily before breakfast    loperamide (IMODIUM) 2 MG capsule   Yes Yes   Sig: Take 2 mg by mouth 4 times daily as needed   losartan (COZAAR) 100 MG tablet 11/22/2021 at Unknown time  Yes Yes   Sig: Take 100 mg by mouth daily (with lunch)   medical cannabis (Patient's own supply) 11/22/2021 at x1  Yes Yes   Sig: Take 1 Dose by mouth See Admin Instructions (The purpose of this order is to document that the patient reports taking medical cannabis.  This is not a prescription, and is not used to certify that the patient has a qualifying medical condition.)    Tangerine capsule  Take 1 capsule in the morning and 1 hour prior to sleep with milk    Emulsion with supper   polyethylene glycol (MIRALAX) 17 GM/Dose powder   Yes Yes   Sig: Take 1 capful by mouth 2 times daily as needed for constipation   polyethylene glycol-propylene glycol (SYSTANE) 0.4-0.3 % SOLN ophthalmic solution   Yes Yes   Sig: Place 2 drops into both eyes 3 times daily as needed   vitamin B-12 (CYANOCOBALAMIN) 1000 MCG tablet 11/22/2021 at Unknown time  Yes Yes   Sig: Take 1,000 mcg by mouth daily (with lunch)      Facility-Administered Medications: None     Allergies   Allergies   Allergen Reactions     Other Allergy (See Comments) [External Allergen Needs Reconciliation - See Comment] Other (See Comments)     Patient Reports Reaction to FLU SHOT - Egg allergy!!, FLU SHOT - Egg allergy, PN: FLU SHOT - Egg allergy     Citalopram Analogues [Citalopram] Unknown and Other (See Comments)     Per pt and outside records     Demeclocycline Unknown     Doxycycline Unknown and Other (See Comments)     Per pt and outside records;     Erythromycin Unknown and Other (See Comments)     Per pt and outside records;       Erythromycin Ethylsuccinate [Erythromycin] Unknown     Hydromorphone Other (See Comments)     Depression, PN: Depression, Patient reports having depression     Iodine And Iodide Containing Products [Iodine] Other (See Comments) and Unknown     unknown, Per pt and outside records, pt's son confirms she is allergic to Iodine, PN: unknown     Sulfa (Sulfonamide Antibiotics) [Sulfa Drugs] Unknown, Other (See Comments) and Nausea and Vomiting     Confusion,, Other reaction(s): Confusion, Other (See Comments), confusion     Sulfasalazine Nausea and Vomiting     Terfenadine Unknown and Other (See Comments)     Other reaction(s): *Unknown, Per pt and outside records;     Tetracycline Other (See Comments) and Unknown     Per pt and outside records     Venlafaxine Analogues [Venlafaxine] Unknown     Dizziness and confusion     Cephalosporins Nausea and Vomiting, Itching, Other (See Comments) and Rash     Itching     Prednisone Unknown, Anxiety and Other (See Comments)     Other reaction(s): Confusion, Hallucinations, Confusion, Confusion, hallucination,       Physical Exam   Vital Signs: Temp: 98.4  F (36.9  C) Temp src: Oral BP: (!) 176/77 Pulse: 67   Resp: 20 SpO2: 97 % O2 Device: None (Room air)    Weight: 153 lbs 3.2 oz    Physical Examination:   General appearance - alert, well appearing, and in no distress   Mental status - alert, oriented to self and place but not time.  Poor memory recall  HEENT - sclera anicteric, left eye normal, right eye normal, nares normal and patent, no erythema, discharge or polyps and sinuses normal and nontender, mucous membranes moist, pharynx normal without lesions, dental hygiene good and tongue normal  Respiratory - clear to auscultation, no wheezes, rales or rhonchi, symmetric air entry, no tachypnea,  Cardiac - normal rate, regular rhythm, normal S1, S2, no murmurs,  Abdomen - soft, nontender, nondistended,  Neurological - alert, no focal findings or movement disorder noted, has some  aphasic speech  Musculoskeletal - no joint tenderness, ulnar deviation of the hands/fingers  Extremities - peripheral pulses normal, no pedal edema,   Skin - normal coloration and turgor, no rashes, no suspicious skin lesions noted      Data   Data reviewed today: I reviewed all medications, new labs and imaging results over the last 24 hours. I personally reviewed EKG.    Recent Labs   Lab 11/22/21  1634   WBC 6.5   HGB 13.4   MCV 91      *   POTASSIUM 3.5   CHLORIDE 99   CO2 29   BUN 16   CR 0.94   ANIONGAP 6   CHAMP 9.4   *   ALBUMIN 3.9   PROTTOTAL 6.7   BILITOTAL 0.7   ALKPHOS 55   ALT 12   AST 16     Recent Results (from the past 24 hour(s))   CT Head w/o Contrast    Narrative    EXAM: CT HEAD W/O CONTRAST  LOCATION: [facility]  DATE/TIME: 11/22/2021 4:45 PM    INDICATION: Weak, lightheaded, presyncope. Injury.    COMPARISON: [facility] CT brain October 1, 2021.    TECHNIQUE: Routine CT Head without IV contrast. Multiplanar reformats. Dose reduction techniques were used.    FINDINGS:  INTRACRANIAL CONTENTS: No intracranial hemorrhage, extra-axial collection, or mass effect. No CT evidence of acute infarct. Severe presumed chronic small vessel ischemic changes. Moderate generalized volume loss. No hydrocephalus. Skull base vascular   calcifications. Normal appearance of the brain stem.     VISUALIZED ORBITS/SINUSES/MASTOIDS: Prior bilateral cataract surgery. Visualized portions of the orbits are otherwise unremarkable. No paranasal sinus mucosal disease. No middle ear or mastoid effusion.    BONES/SOFT TISSUES: No acute abnormality.      Impression    IMPRESSION:  1.  No CT evidence for acute intracranial process.  2.  Brain atrophy and presumed chronic microvascular ischemic changes as above.   CT Cervical Spine w/o Contrast    Narrative    EXAM: CT CERVICAL SPINE W/O CONTRAST  LOCATION: [facility]  DATE/TIME: 11/22/2021 4:47  PM    INDICATION: Injury. Pain. Weakness.  COMPARISON: None.  TECHNIQUE: Routine CT Cervical Spine without IV contrast. Multiplanar reformats. Dose reduction techniques were used.    FINDINGS:  Normal vertebral body heights. 1.5 mm degenerative retrolisthesis at C5-C6 and C6-C7. 1 mm degenerative anterolisthesis at C7-T1. No extraspinal abnormality.     Craniovertebral Junction and C1-C2: Transverse ligamentous thickening and calcification. Patent central canal. Anatomic alignment.    C2-C3: Small central disc osteophyte. Bilateral facet DJD moderate on the left and mild on the right. Mild to moderate left foraminal stenosis. Right foramen patent. Patent central canal.     C3-C4: 1 mm degenerative anterolisthesis. Facet and uncinate degenerative and hypertrophic changes. Moderate to severe left foraminal stenosis. Small central disc protrusion. Patent central canal.     C4-C5: Disc osteophyte complex. Mild-to-moderate central canal stenosis. Facet and uncinate degenerative changes with moderate bilateral foraminal stenosis.     C5-C6: Disc osteophyte complex eccentric right. Moderate canal stenosis with ventral cord contact. Uncinate hypertrophic spurring with severe right and moderate left foraminal stenosis.     C6-C7: Disc osteophyte complex. Adequate central canal. Uncinate spur with minor bilateral foraminal stenosis.     C7-T1: Facet DJD. Mild bilateral foraminal stenosis. Patent central canal.       Impression    IMPRESSION:  1.  Cervical spondylosis. Negative for fracture or traumatic malalignment.   XR Chest 1 View    Narrative    EXAM: XR CHEST 1 VIEW  LOCATION: [facility]  DATE/TIME: 11/22/2021 4:46 PM    INDICATION: weakness, elderly  COMPARISON: Portable AP view the chest 8/23/2021      Impression    IMPRESSION:     Lungs are symmetrically inflated. No lung edema or, consolidation, or focal volume loss.    Cardiac silhouette is normal in size. Vascular pedicle width is  normal.    No pleural fluid or pneumothorax.    Bilateral glenohumeral osteoarthrosis. Mild thoracic spine degenerative osteophytes.

## 2021-11-23 NOTE — PROGRESS NOTES
Pt slept well overnight. IV abx infused. Assist x1 with gait belt and walker. Alert, oriented but forgetful. Urine culture pending. Waiting for placement. Vitals stable. No reports of pain overnight. Incontinent urine- purewick in place.

## 2021-11-23 NOTE — PROGRESS NOTES
[facility]      OUTPATIENT OCCUPATIONAL THERAPY  EVALUATION  PLAN OF TREATMENT FOR OUTPATIENT REHABILITATION  (COMPLETE FOR INITIAL CLAIMS ONLY)  Patient's Last Name, First Name, M.I.  YOB: 1929  GermanAni  JOSHUA                          Provider's Name  [facility] Medical Record No.  [id number]                               Onset Date:  11/22/21   Start of Care Date:  11/23/21     Type:     ___PT   _X_OT   ___SLP Medical Diagnosis:  falls                        OT Diagnosis:  decreased ADLs   Visits from SOC:  1   _________________________________________________________________________________  Plan of Treatment/Functional Goals    Planned Interventions: ADL retraining,cognition,transfer training   Goals: See Occupational Therapy Goals on Care Plan [facility] health record.    Therapy Frequency: Daily  Predicted Duration of Therapy Intervention: 6 days  _________________________________________________________________________________    I CERTIFY THE NEED FOR THESE SERVICES FURNISHED UNDER        THIS PLAN OF TREATMENT AND WHILE UNDER MY CARE     (Physician co-signature of this document indicates review and certification of the therapy plan).                Certification date from: 11/23/21, Certification date to: 12/23/21                 Initial Assessment        See Occupational Therapy evaluation dated 11/23/21 in Epic electronic health record.

## 2021-11-23 NOTE — UTILIZATION REVIEW
Concurrent stay review; Secondary Review Determination       Under the authority of the Utilization Management Committee, the utilization review process indicated a secondary review on the above patient.  The review outcome is based on review of the medical records, discussions with staff, and applying clinical experience noted on the date of the review.          (x) Observation Status Appropriate - Concurrent stay review    RATIONALE FOR DETERMINATION   Ms. Porter is a 89 yo female with a PMH of recurrent UTI's and mild dementia who presents to ED with weakness and lightheadedness.  She continues on IV cipro as UC is pending.  She has no fevers, leukocytosis.  Vitals have been stable.  She is not on IVF and is tolerating a regular diet.      Patient is clinically improving and there is no clear indication to change patient's status to inpatient. The severity of illness, intensity of service provided, expected LOS and risk for adverse outcome make the care appropriate for observation.      The information on this document is developed by the utilization review team in order for the business office to ensure compliance.  This only denotes the appropriateness of proper admission status and does not reflect the quality of care rendered.         The definitions of Inpatient Status and Observation Status used in making the determination above are those provided in the CMS Coverage Manual, Chapter 1 and Chapter 6, section 70.4.          Sincerely,       Riri Dimas, DO  Utilization Review  Physician Advisor  [facility].

## 2021-11-24 ENCOUNTER — APPOINTMENT (OUTPATIENT)
Dept: OCCUPATIONAL THERAPY | Facility: HOSPITAL | Age: 86
DRG: 689 | End: 2021-11-24
Payer: MEDICARE

## 2021-11-24 ENCOUNTER — APPOINTMENT (OUTPATIENT)
Dept: PHYSICAL THERAPY | Facility: HOSPITAL | Age: 86
DRG: 689 | End: 2021-11-24
Payer: MEDICARE

## 2021-11-24 LAB
ANION GAP SERPL CALCULATED.3IONS-SCNC: 10 MMOL/L (ref 5–18)
BACTERIA UR CULT: ABNORMAL
BUN SERPL-MCNC: 10 MG/DL (ref 8–28)
CALCIUM SERPL-MCNC: 8.9 MG/DL (ref 8.5–10.5)
CHLORIDE BLD-SCNC: 101 MMOL/L (ref 98–107)
CO2 SERPL-SCNC: 24 MMOL/L (ref 22–31)
CREAT SERPL-MCNC: 0.79 MG/DL (ref 0.6–1.1)
ERYTHROCYTE [DISTWIDTH] IN BLOOD BY AUTOMATED COUNT: 13 % (ref 10–15)
GFR SERPL CREATININE-BSD FRML MDRD: 65 ML/MIN/1.73M2
GLUCOSE BLD-MCNC: 198 MG/DL (ref 70–125)
HCT VFR BLD AUTO: 35.1 % (ref 35–47)
HGB BLD-MCNC: 11.8 G/DL (ref 11.7–15.7)
MAGNESIUM SERPL-MCNC: 1.4 MG/DL (ref 1.8–2.6)
MAGNESIUM SERPL-MCNC: 1.5 MG/DL (ref 1.8–2.6)
MCH RBC QN AUTO: 31.2 PG (ref 26.5–33)
MCHC RBC AUTO-ENTMCNC: 33.6 G/DL (ref 31.5–36.5)
MCV RBC AUTO: 93 FL (ref 78–100)
PLATELET # BLD AUTO: 205 10E3/UL (ref 150–450)
POTASSIUM BLD-SCNC: 3.3 MMOL/L (ref 3.5–5)
POTASSIUM BLD-SCNC: 3.4 MMOL/L (ref 3.5–5)
RBC # BLD AUTO: 3.78 10E6/UL (ref 3.8–5.2)
SODIUM SERPL-SCNC: 135 MMOL/L (ref 136–145)
TROPONIN I SERPL-MCNC: 0.01 NG/ML (ref 0–0.29)
TROPONIN I SERPL-MCNC: 0.03 NG/ML (ref 0–0.29)
TROPONIN I SERPL-MCNC: <0.01 NG/ML (ref 0–0.29)
WBC # BLD AUTO: 4.7 10E3/UL (ref 4–11)

## 2021-11-24 PROCEDURE — 80048 BASIC METABOLIC PNL TOTAL CA: CPT | Performed by: FAMILY MEDICINE

## 2021-11-24 PROCEDURE — 99233 SBSQ HOSP IP/OBS HIGH 50: CPT | Performed by: FAMILY MEDICINE

## 2021-11-24 PROCEDURE — 250N000011 HC RX IP 250 OP 636: Performed by: FAMILY MEDICINE

## 2021-11-24 PROCEDURE — 96376 TX/PRO/DX INJ SAME DRUG ADON: CPT

## 2021-11-24 PROCEDURE — 84484 ASSAY OF TROPONIN QUANT: CPT | Performed by: FAMILY MEDICINE

## 2021-11-24 PROCEDURE — 93010 ELECTROCARDIOGRAM REPORT: CPT | Mod: HIP | Performed by: INTERNAL MEDICINE

## 2021-11-24 PROCEDURE — 97110 THERAPEUTIC EXERCISES: CPT | Mod: GO

## 2021-11-24 PROCEDURE — 85027 COMPLETE CBC AUTOMATED: CPT | Performed by: FAMILY MEDICINE

## 2021-11-24 PROCEDURE — 250N000009 HC RX 250: Performed by: FAMILY MEDICINE

## 2021-11-24 PROCEDURE — 250N000013 HC RX MED GY IP 250 OP 250 PS 637: Performed by: INTERNAL MEDICINE

## 2021-11-24 PROCEDURE — 250N000013 HC RX MED GY IP 250 OP 250 PS 637: Performed by: FAMILY MEDICINE

## 2021-11-24 PROCEDURE — 120N000001 HC R&B MED SURG/OB

## 2021-11-24 PROCEDURE — G0378 HOSPITAL OBSERVATION PER HR: HCPCS

## 2021-11-24 PROCEDURE — 83735 ASSAY OF MAGNESIUM: CPT | Performed by: FAMILY MEDICINE

## 2021-11-24 PROCEDURE — 97535 SELF CARE MNGMENT TRAINING: CPT | Mod: GO

## 2021-11-24 PROCEDURE — 93005 ELECTROCARDIOGRAM TRACING: CPT

## 2021-11-24 PROCEDURE — 36415 COLL VENOUS BLD VENIPUNCTURE: CPT | Performed by: FAMILY MEDICINE

## 2021-11-24 PROCEDURE — 84132 ASSAY OF SERUM POTASSIUM: CPT | Performed by: FAMILY MEDICINE

## 2021-11-24 RX ORDER — AMLODIPINE BESYLATE 5 MG/1
5 TABLET ORAL DAILY
Status: DISCONTINUED | OUTPATIENT
Start: 2021-11-25 | End: 2021-11-26 | Stop reason: HOSPADM

## 2021-11-24 RX ORDER — AMLODIPINE BESYLATE 2.5 MG/1
2.5 TABLET ORAL ONCE
Status: COMPLETED | OUTPATIENT
Start: 2021-11-24 | End: 2021-11-24

## 2021-11-24 RX ORDER — OLANZAPINE 2.5 MG/1
2.5 TABLET, FILM COATED ORAL
Status: DISCONTINUED | OUTPATIENT
Start: 2021-11-24 | End: 2021-11-25

## 2021-11-24 RX ORDER — LIDOCAINE 50 MG/G
OINTMENT TOPICAL EVERY 4 HOURS PRN
Status: DISCONTINUED | OUTPATIENT
Start: 2021-11-24 | End: 2021-11-26 | Stop reason: HOSPADM

## 2021-11-24 RX ORDER — MAGNESIUM OXIDE 400 MG/1
400 TABLET ORAL 3 TIMES DAILY
Status: DISCONTINUED | OUTPATIENT
Start: 2021-11-24 | End: 2021-11-26 | Stop reason: HOSPADM

## 2021-11-24 RX ORDER — POTASSIUM CHLORIDE 1500 MG/1
40 TABLET, EXTENDED RELEASE ORAL ONCE
Status: DISCONTINUED | OUTPATIENT
Start: 2021-11-24 | End: 2021-11-24

## 2021-11-24 RX ORDER — POTASSIUM CHLORIDE 1500 MG/1
40 TABLET, EXTENDED RELEASE ORAL ONCE
Status: COMPLETED | OUTPATIENT
Start: 2021-11-24 | End: 2021-11-24

## 2021-11-24 RX ORDER — AMLODIPINE BESYLATE 2.5 MG/1
2.5 TABLET ORAL DAILY
Status: DISCONTINUED | OUTPATIENT
Start: 2021-11-24 | End: 2021-11-24

## 2021-11-24 RX ADMIN — ASPIRIN 81 MG: 81 TABLET, COATED ORAL at 16:15

## 2021-11-24 RX ADMIN — CIPROFLOXACIN 400 MG: 2 INJECTION, SOLUTION INTRAVENOUS at 21:14

## 2021-11-24 RX ADMIN — MAGNESIUM OXIDE TAB 400 MG (241.3 MG ELEMENTAL MG) 400 MG: 400 (241.3 MG) TAB at 22:56

## 2021-11-24 RX ADMIN — Medication 3 MG: at 22:56

## 2021-11-24 RX ADMIN — ACETAMINOPHEN 975 MG: 325 TABLET ORAL at 21:09

## 2021-11-24 RX ADMIN — LIDOCAINE: 50 OINTMENT TOPICAL at 19:10

## 2021-11-24 RX ADMIN — LEVOTHYROXINE SODIUM 50 MCG: 0.03 TABLET ORAL at 08:29

## 2021-11-24 RX ADMIN — FAMOTIDINE 20 MG: 20 TABLET, FILM COATED ORAL at 09:37

## 2021-11-24 RX ADMIN — Medication 2000 UNITS: at 12:35

## 2021-11-24 RX ADMIN — POTASSIUM CHLORIDE 40 MEQ: 1500 TABLET, EXTENDED RELEASE ORAL at 21:13

## 2021-11-24 RX ADMIN — HYDRALAZINE HYDROCHLORIDE 12.5 MG: 25 TABLET, FILM COATED ORAL at 08:36

## 2021-11-24 RX ADMIN — CIPROFLOXACIN 400 MG: 2 INJECTION, SOLUTION INTRAVENOUS at 09:37

## 2021-11-24 RX ADMIN — CYANOCOBALAMIN TAB 1000 MCG 1000 MCG: 1000 TAB at 12:36

## 2021-11-24 RX ADMIN — BENZONATATE 200 MG: 100 CAPSULE ORAL at 21:11

## 2021-11-24 RX ADMIN — HYDRALAZINE HYDROCHLORIDE 12.5 MG: 25 TABLET, FILM COATED ORAL at 21:09

## 2021-11-24 RX ADMIN — Medication 1 TABLET: at 16:15

## 2021-11-24 RX ADMIN — BENZONATATE 200 MG: 100 CAPSULE ORAL at 08:36

## 2021-11-24 RX ADMIN — AMLODIPINE BESYLATE 2.5 MG: 2.5 TABLET ORAL at 18:28

## 2021-11-24 RX ADMIN — AMLODIPINE BESYLATE 2.5 MG: 2.5 TABLET ORAL at 13:46

## 2021-11-24 RX ADMIN — Medication 1 TABLET: at 09:37

## 2021-11-24 RX ADMIN — LOSARTAN POTASSIUM 100 MG: 50 TABLET, FILM COATED ORAL at 12:34

## 2021-11-24 RX ADMIN — ACETAMINOPHEN 975 MG: 325 TABLET ORAL at 09:37

## 2021-11-24 RX ADMIN — DULOXETINE HYDROCHLORIDE 60 MG: 60 CAPSULE, DELAYED RELEASE ORAL at 18:31

## 2021-11-24 ASSESSMENT — ACTIVITIES OF DAILY LIVING (ADL)
ADLS_ACUITY_SCORE: 22

## 2021-11-24 NOTE — PLAN OF CARE
Problem: Adult Inpatient Plan of Care  Goal: Plan of Care Review  Outcome: No Change     Patient was more confused this am and cleared as the day progressed. Patient is still forgetful which is her baseline. BP has been elevated. She had a good BM this afternoon. Assist of one with walker and gait belt but patient is still very weak. Patient sat in chair for breakfast.

## 2021-11-24 NOTE — PLAN OF CARE
"PRIMARY DIAGNOSIS: \"GENERIC\" NURSING  OUTPATIENT/OBSERVATION GOALS TO BE MET BEFORE DISCHARGE:  1. ADLs back to baseline: Yes, has daily homecare.     2. Activity and level of assistance: Up with standby assistance.    3. Pain status: Pain free.    4. Return to near baseline physical activity: No with walker and gait belt x 1 assist.      Discharge Planner Nurse   Safe discharge environment identified: No, confused, disoriented, lives by self in assisted living. Recommending TCU.   Barriers to discharge: Yes, IV antibiotic. Pt refusing treatment.        Entered by: Hannah Ruiz 11/23/2021 8:36 PM     Please review provider order for any additional goals.   Nurse to notify provider when observation goals have been met and patient is ready for discharge.  "

## 2021-11-24 NOTE — UTILIZATION REVIEW
Admission Status; Secondary Review Determination   Under the authority of the Utilization Management Committee, the utilization review process indicated a secondary review on Ani Porter. The review outcome is based on review of the medical records, discussions with staff, and applying clinical experience noted on the date of the review.   (x) Inpatient Status Appropriate - This patient's medical care is consistent with medical management for inpatient care and reasonable inpatient medical practice.     RATIONALE FOR DETERMINATION   89 yo female with a PMH of recurrent UTI's and mild dementia who presents to ED with weakness and lightheadedness.  Still confused and on IV cipro , positive urine culture for Klebsiela,  Pending sensitivity , low electrolytes and uncontrolled HTN, crossing 3rd midnight     At the time of admission with the information available to the attending physician more than 2 nights Hospital complex care was anticipated, based on patient risk of adverse outcome if treated as outpatient and complex care required. Inpatient admission is appropriate based on the Medicare guidelines.   The information on this document is developed by the utilization review team in order for the business office to ensure compliance. This only denotes the appropriateness of proper admission status and does not reflect the quality of care rendered.   The definitions of Inpatient Status and Observation Status used in making the determination above are those provided in the CMS Coverage Manual, Chapter 1 and Chapter 6, section 70.4.   Sincerely,   Regino Fischer MD  Utilization Review  Physician Advisor  [facility]

## 2021-11-24 NOTE — PROGRESS NOTES
[facility] [place]GRESS [place]      ADMIT DATE: 11/22/2021     FACILITY: [facility]    PCP: Archana Patel, [phone]    ASSESSMENT AND PLAN:      Patient is a 89-year-old female with a history of TIA, mild dementia, heart failure, recurrent UTIs, hypertension, hypothyroidism, urinary incontinence that presents with weakness and lightheadedness.      Active Problems:    Impaired mobility and ADLs    Falls frequently         Recurrent UTI  -abnormal urinalysis, confusion  -Started on ciprofloxacin 40mg IV x 3 days due to resistances in the past.  Last UTI 8/23/2021 growing Klebsiella pneumonia susceptible to ceftriaxone, ciprofloxacin and meropenem. Has cephalosporin allergy. QTC not significantly prolonged with QTc of 482 ms.   -C/w IV cipro  -urine culture pending-->shows growth of klebsiella oxytoca  -ordered repeat EKG on 11/24 to monitor QTc interval.    -patient sees urologist at . Last urology appointment was 6/11/2021. Will have urology see her here. Urology consult pending.        Weakness  -PT/OT---recommending TCU. Alison Ayala states patient will most likely refuse TCU and patient will most likely be discharged with 24 hour care at her facility.   - consulted     Hypothyroidism-TSH 11/23/2021 is 2.00 which is WNL. continue Synthroid 50 mcg daily.      Hypertension-BP still elevated. C/w home hydralazine 12.5 mg twice daily.c/w home losartan which was restarted on 11/23. Started norvasc on 11/24.      Acute encephalopathy on top of Dementia  -seems pretty confused. Possibly acute infectious encephalopathy due to UTI on dementia. I would be concerned with her living without 24hr supervision if this is her baseline.  -patient is becoming confused at nighttime and refusing meds then (look at nursing notes overnight 11/23-11/24)   -stopped home bedtime atarax on 11/24 since that can contribute to confusion in elderly and started low dose zyprexa.        GERD  -c/w home  pepcid    Mood disorder  -c/w home cymbalta    Chest pain  -Tenderness on palpation on left side of chest on physical exam-->most likely costochondritis  -repeat EKG shows sinus rhythm with HR of 66 bpm. Troponin x 1 negative. Trending troponin.  -start lidocaine ointment to chest    Hypokalemia  -RN managed K [employer] protocols       FEN/GI: low salt diet  DVT proph: SCDs  Code status: No CPR- Do NOT Intubate      Updated tommy Porter on current plan.       Discharge barriers:  Urine culture pending, Acute encephalopathy, urology consult   -ADOD: 1-2 days      SUBJECTIVE:    Patient complains of left-sided chest pain that she had on admission. She states chest pain is improving. Chest pain is worse with touching. Patient denies any other complaints at this time.     ROS:  12 Points review of systems reviewed and is negative except for what has already been mentioned above    OBJECTIVE:  Patient Vitals for the past 24 hrs:   BP Temp Temp src Pulse Resp SpO2 Weight   11/24/21 0435 (!) 176/80 98.1  F (36.7  C) Oral 70 18 93 % --   11/23/21 2339 139/83 98.6  F (37  C) Oral 74 18 98 % --   11/1935 (!) 181/84 98.1  F (36.7  C) Oral 74 18 95 % --   11/23/21 1707 -- -- -- -- -- -- 71.1 kg (156 lb 11.2 oz)   11/23/21 1546 (!) 172/77 98.1  F (36.7  C) Oral 66 18 96 % --   11/23/21 1315 (!) 160/75 -- -- -- -- -- --   11/23/21 1231 (!) 173/80 98  F (36.7  C) Oral 63 18 95 % --   11/23/21 0824 (!) 178/86 98  F (36.7  C) Oral 66 18 96 % --          Intake/Output Summary (Last 24 hours) at 11/23/2021 0740  Last data filed at 11/23/2021 0530  Gross per 24 hour   Intake --   Output 250 ml   Net -250 ml     GENRL: Not in acute distress. Satting at 96% on room air. Sleepy.   HEENT: NC/AT      Neck- supple      Sclera- anicteric      Mucous membrane- moist and pink  CHEST: Clear to auscultation bilaterally. Tenderness on palpation on left side of chest.   HEART: S1S2 regular. No murmurs, rubs or gallops  ABDMN: Soft.  Non-tender.No guarding or rigidity. Bowel sounds- active. Purewick in place draining yellow-colored urine.   EXTRM:  No pedal edema.   NEURO:  No involuntary movements  INTGM: please see nursing assessment for full skin assessment  PULSES: 2+ and symmetric all extremities  PSYCH: normal speech    DIAGNOSTIC DATA:          Recent Results (from the past 24 hour(s))   Basic metabolic panel    Collection Time: 11/23/21  7:46 AM   Result Value Ref Range    Sodium 135 (L) 136 - 145 mmol/L    Potassium 3.5 3.5 - 5.0 mmol/L    Chloride 101 98 - 107 mmol/L    Carbon Dioxide (CO2) 27 22 - 31 mmol/L    Anion Gap 7 5 - 18 mmol/L    Urea Nitrogen 15 8 - 28 mg/dL    Creatinine 0.79 0.60 - 1.10 mg/dL    Calcium 9.1 8.5 - 10.5 mg/dL    Glucose 99 70 - 125 mg/dL    GFR Estimate 65 >60 mL/min/1.73m2   CBC with platelets    Collection Time: 11/23/21  7:46 AM   Result Value Ref Range    WBC Count 4.9 4.0 - 11.0 10e3/uL    RBC Count 3.78 (L) 3.80 - 5.20 10e6/uL    Hemoglobin 11.5 (L) 11.7 - 15.7 g/dL    Hematocrit 34.6 (L) 35.0 - 47.0 %    MCV 92 78 - 100 fL    MCH 30.4 26.5 - 33.0 pg    MCHC 33.2 31.5 - 36.5 g/dL    RDW 13.0 10.0 - 15.0 %    Platelet Count 210 150 - 450 10e3/uL   TSH with free T4 reflex    Collection Time: 11/23/21  7:46 AM   Result Value Ref Range    TSH 2.00 0.30 - 5.00 uIU/mL        Results for orders placed or performed during the hospital encounter of 11/22/21   CT Head w/o Contrast    Impression    IMPRESSION:  1.  No CT evidence for acute intracranial process.  2.  Brain atrophy and presumed chronic microvascular ischemic changes as above.   CT Cervical Spine w/o Contrast    Impression    IMPRESSION:  1.  Cervical spondylosis. Negative for fracture or traumatic malalignment.   XR Chest 1 View    Impression    IMPRESSION:     Lungs are symmetrically inflated. No lung edema or, consolidation, or focal volume loss.    Cardiac silhouette is normal in size. Vascular pedicle width is normal.    No pleural fluid or  pneumothorax.    Bilateral glenohumeral osteoarthrosis. Mild thoracic spine degenerative osteophytes.          All recent labs reviewed personally  Radiology report reviewed.       The total time spent in preparing this progress note is about 35 minutes, >50% time spent in care co-ordination that includes reviewing labs, images, discussing the plan of care with patient/family, consultants, and .      Celestino Ngo MD.   [facility] Medicine Service   [phone]   Pager [phone]

## 2021-11-24 NOTE — PLAN OF CARE
"PRIMARY DIAGNOSIS: \"GENERIC\" NURSING  OUTPATIENT/OBSERVATION GOALS TO BE MET BEFORE DISCHARGE:  1. ADLs back to baseline: Yes, has daily homecare. Staff from homecare was here to help pt today.     2. Activity and level of assistance: Up with standby assistance with walker and gait belt    3. Pain status: Pain free.    4. Return to near baseline physical activity: Yes, has homecare staff to help.      Discharge Planner Nurse   Safe discharge environment identified: No, discharge to TCU  Barriers to discharge: Yes, receiving IV antibiotic.       Entered by: Hannah Ruiz 11/23/2021 7:10 PM       "

## 2021-11-24 NOTE — PLAN OF CARE
"2PRIMARY DIAGNOSIS: \"GENERIC\" NURSING  OUTPATIENT/OBSERVATION GOALS TO BE MET BEFORE DISCHARGE:  1. ADLs back to baseline: No    2. Activity and level of assistance: Up with standby assistance.    3. Pain status: Improved-controlled with oral pain medications.    4. Return to near baseline physical activity: No     Discharge Planner Nurse   Safe discharge environment identified: No  Barriers to discharge: Yes Still receiving IV abx, and weak. BP not controlled.        Entered by: Lindsey Degroot 11/24/2021 1:39 PM     Please review provider order for any additional goals.   Nurse to notify provider when observation goals have been met and patient is ready for discharge.  "

## 2021-11-24 NOTE — PROVIDER NOTIFICATION
"Pt refused HS meds. Able to run Cipro. Reapproached three times and with a different nurse. \"Waiting on God.\" \"Ready to go.\"  "

## 2021-11-24 NOTE — PLAN OF CARE
PRIMARY DIAGNOSIS: GENERALIZED WEAKNESS    OUTPATIENT/OBSERVATION GOALS TO BE MET BEFORE DISCHARGE  1. Orthostatic performed: N/A    2. Tolerating PO medications: Yes    3. Return to near baseline physical activity: Yes    4. Cleared for discharge by consultants (if involved): No    Discharge Planner Nurse   Safe discharge environment identified: Yes  Barriers to discharge: Yes       Entered by: Aleah Funk 11/24/2021 2:23 AM     Please review provider order for any additional goals.   Nurse to notify provider when observation goals have been met and patient is ready for discharge.

## 2021-11-24 NOTE — PLAN OF CARE
"Sundowning, confused, disoriented x3. No pain. BP elevated: Blood pressure (!) 181/84, pulse 74, temperature 98.1  F (36.7  C), temperature source Oral, resp. rate 18, height 1.575 m (5' 2\"), weight 71.1 kg (156 lb 11.2 oz), SpO2 95 %.    Refused hs meds tonight. Perseverates on cost of each medication and how she is ready to \"leave it up to God.\" Reapproached many times. Attempted to convince pt to call step-children, pt declined. Paranoid, does not trust anyone. Replaced dressing on IV. Pt does not think she needs it anymore. Denies SI/HI -\"I am a Mu-ism.\" Denies self-harm. Bed alarm on. Frequent checks on pt. Non-skid socks on. Call light in lap.   "

## 2021-11-24 NOTE — PLAN OF CARE
"PRIMARY DIAGNOSIS: \"GENERIC\" NURSING  OUTPATIENT/OBSERVATION GOALS TO BE MET BEFORE DISCHARGE:  ADLs back to baseline: No    Activity and level of assistance: Up with maximum assistance. Consider SW and/or PT evaluation.     Pain status: Pain free.    Return to near baseline physical activity: No     Discharge Planner Nurse   Safe discharge environment identified: Yes  Barriers to discharge: Yes       Entered by: Aleah Funk 11/24/2021 5:59 AM   Pt asleep most of the shift. BP elevated. Per report, pt refused scheduled HS meds. Up with assist of 1. Orientation fluctuates. Pt denied pain.   Please review provider order for any additional goals.   Nurse to notify provider when observation goals have been met and patient is ready for discharge.  "

## 2021-11-25 PROBLEM — E87.6 HYPOKALEMIA: Status: ACTIVE | Noted: 2021-11-25

## 2021-11-25 PROBLEM — I16.0 ASYMPTOMATIC HYPERTENSIVE URGENCY: Status: ACTIVE | Noted: 2021-11-25

## 2021-11-25 PROBLEM — G93.41 ACUTE METABOLIC ENCEPHALOPATHY: Status: ACTIVE | Noted: 2021-11-25

## 2021-11-25 PROBLEM — R62.7 ADULT FAILURE TO THRIVE: Status: ACTIVE | Noted: 2021-11-25

## 2021-11-25 LAB
ANION GAP SERPL CALCULATED.3IONS-SCNC: 13 MMOL/L (ref 5–18)
ATRIAL RATE - MUSE: 92 BPM
BUN SERPL-MCNC: 10 MG/DL (ref 8–28)
CALCIUM SERPL-MCNC: 9.3 MG/DL (ref 8.5–10.5)
CHLORIDE BLD-SCNC: 100 MMOL/L (ref 98–107)
CO2 SERPL-SCNC: 21 MMOL/L (ref 22–31)
CREAT SERPL-MCNC: 0.73 MG/DL (ref 0.6–1.1)
DIASTOLIC BLOOD PRESSURE - MUSE: NORMAL MMHG
ERYTHROCYTE [DISTWIDTH] IN BLOOD BY AUTOMATED COUNT: 13 % (ref 10–15)
GFR SERPL CREATININE-BSD FRML MDRD: 72 ML/MIN/1.73M2
GLUCOSE BLD-MCNC: 158 MG/DL (ref 70–125)
HCT VFR BLD AUTO: 39.1 % (ref 35–47)
HGB BLD-MCNC: 13 G/DL (ref 11.7–15.7)
INTERPRETATION ECG - MUSE: NORMAL
MCH RBC QN AUTO: 30.5 PG (ref 26.5–33)
MCHC RBC AUTO-ENTMCNC: 33.2 G/DL (ref 31.5–36.5)
MCV RBC AUTO: 92 FL (ref 78–100)
P AXIS - MUSE: 29 DEGREES
PLATELET # BLD AUTO: 59 10E3/UL (ref 150–450)
POTASSIUM BLD-SCNC: 3.3 MMOL/L (ref 3.5–5)
POTASSIUM BLD-SCNC: 4.2 MMOL/L (ref 3.5–5)
POTASSIUM BLD-SCNC: 4.2 MMOL/L (ref 3.5–5)
PR INTERVAL - MUSE: 142 MS
QRS DURATION - MUSE: 78 MS
QT - MUSE: 332 MS
QTC - MUSE: 410 MS
R AXIS - MUSE: -37 DEGREES
RBC # BLD AUTO: 4.26 10E6/UL (ref 3.8–5.2)
SODIUM SERPL-SCNC: 134 MMOL/L (ref 136–145)
SYSTOLIC BLOOD PRESSURE - MUSE: NORMAL MMHG
T AXIS - MUSE: 38 DEGREES
TROPONIN I SERPL-MCNC: 0.01 NG/ML (ref 0–0.29)
TROPONIN I SERPL-MCNC: 0.03 NG/ML (ref 0–0.29)
TROPONIN I SERPL-MCNC: 0.03 NG/ML (ref 0–0.29)
VENTRICULAR RATE- MUSE: 92 BPM
WBC # BLD AUTO: 5.4 10E3/UL (ref 4–11)

## 2021-11-25 PROCEDURE — 250N000011 HC RX IP 250 OP 636: Performed by: HOSPITALIST

## 2021-11-25 PROCEDURE — 250N000013 HC RX MED GY IP 250 OP 250 PS 637: Performed by: FAMILY MEDICINE

## 2021-11-25 PROCEDURE — 93010 ELECTROCARDIOGRAM REPORT: CPT | Mod: HIP | Performed by: INTERNAL MEDICINE

## 2021-11-25 PROCEDURE — 250N000013 HC RX MED GY IP 250 OP 250 PS 637: Performed by: INTERNAL MEDICINE

## 2021-11-25 PROCEDURE — 84132 ASSAY OF SERUM POTASSIUM: CPT | Performed by: FAMILY MEDICINE

## 2021-11-25 PROCEDURE — 120N000001 HC R&B MED SURG/OB

## 2021-11-25 PROCEDURE — 93005 ELECTROCARDIOGRAM TRACING: CPT

## 2021-11-25 PROCEDURE — 36415 COLL VENOUS BLD VENIPUNCTURE: CPT | Performed by: FAMILY MEDICINE

## 2021-11-25 PROCEDURE — 85014 HEMATOCRIT: CPT | Performed by: FAMILY MEDICINE

## 2021-11-25 PROCEDURE — 84484 ASSAY OF TROPONIN QUANT: CPT | Performed by: FAMILY MEDICINE

## 2021-11-25 PROCEDURE — 80048 BASIC METABOLIC PNL TOTAL CA: CPT | Performed by: FAMILY MEDICINE

## 2021-11-25 PROCEDURE — 99232 SBSQ HOSP IP/OBS MODERATE 35: CPT | Performed by: FAMILY MEDICINE

## 2021-11-25 RX ORDER — HYDRALAZINE HYDROCHLORIDE 25 MG/1
25 TABLET, FILM COATED ORAL 2 TIMES DAILY
Status: DISCONTINUED | OUTPATIENT
Start: 2021-11-25 | End: 2021-11-26 | Stop reason: HOSPADM

## 2021-11-25 RX ORDER — CIPROFLOXACIN 250 MG/1
250 TABLET, FILM COATED ORAL EVERY 12 HOURS SCHEDULED
Status: COMPLETED | OUTPATIENT
Start: 2021-11-25 | End: 2021-11-25

## 2021-11-25 RX ORDER — HYDRALAZINE HYDROCHLORIDE 20 MG/ML
10 INJECTION INTRAMUSCULAR; INTRAVENOUS EVERY 6 HOURS PRN
Status: DISCONTINUED | OUTPATIENT
Start: 2021-11-25 | End: 2021-11-26 | Stop reason: HOSPADM

## 2021-11-25 RX ORDER — CLONIDINE HYDROCHLORIDE 0.1 MG/1
0.1 TABLET ORAL EVERY 6 HOURS PRN
Status: DISCONTINUED | OUTPATIENT
Start: 2021-11-25 | End: 2021-11-25

## 2021-11-25 RX ORDER — POTASSIUM CHLORIDE 1.5 G/1.58G
40 POWDER, FOR SOLUTION ORAL ONCE
Status: COMPLETED | OUTPATIENT
Start: 2021-11-25 | End: 2021-11-25

## 2021-11-25 RX ORDER — BENZTROPINE MESYLATE 0.5 MG/1
1 TABLET ORAL 3 TIMES DAILY PRN
Status: DISCONTINUED | OUTPATIENT
Start: 2021-11-25 | End: 2021-11-26 | Stop reason: HOSPADM

## 2021-11-25 RX ORDER — CIPROFLOXACIN 250 MG/1
250 TABLET, FILM COATED ORAL EVERY 12 HOURS SCHEDULED
Status: DISCONTINUED | OUTPATIENT
Start: 2021-11-25 | End: 2021-11-25

## 2021-11-25 RX ORDER — ACETAMINOPHEN 650 MG/1
650 SUPPOSITORY RECTAL EVERY 4 HOURS PRN
Status: DISCONTINUED | OUTPATIENT
Start: 2021-11-25 | End: 2021-11-26 | Stop reason: HOSPADM

## 2021-11-25 RX ORDER — OLANZAPINE 2.5 MG/1
2.5 TABLET, FILM COATED ORAL ONCE
Status: COMPLETED | OUTPATIENT
Start: 2021-11-25 | End: 2021-11-26

## 2021-11-25 RX ORDER — ACETAMINOPHEN 325 MG/1
650 TABLET ORAL EVERY 4 HOURS PRN
Status: DISCONTINUED | OUTPATIENT
Start: 2021-11-25 | End: 2021-11-26 | Stop reason: HOSPADM

## 2021-11-25 RX ADMIN — QUETIAPINE FUMARATE 12.5 MG: 25 TABLET ORAL at 23:23

## 2021-11-25 RX ADMIN — CIPROFLOXACIN HYDROCHLORIDE 250 MG: 250 TABLET, FILM COATED ORAL at 08:43

## 2021-11-25 RX ADMIN — Medication 1 TABLET: at 08:43

## 2021-11-25 RX ADMIN — BENZONATATE 200 MG: 100 CAPSULE ORAL at 21:22

## 2021-11-25 RX ADMIN — Medication 2000 UNITS: at 12:43

## 2021-11-25 RX ADMIN — HYDRALAZINE HYDROCHLORIDE 12.5 MG: 25 TABLET, FILM COATED ORAL at 08:43

## 2021-11-25 RX ADMIN — LEVOTHYROXINE SODIUM 50 MCG: 0.03 TABLET ORAL at 06:50

## 2021-11-25 RX ADMIN — CYANOCOBALAMIN TAB 1000 MCG 1000 MCG: 1000 TAB at 12:43

## 2021-11-25 RX ADMIN — MAGNESIUM OXIDE TAB 400 MG (241.3 MG ELEMENTAL MG) 400 MG: 400 (241.3 MG) TAB at 21:24

## 2021-11-25 RX ADMIN — DULOXETINE HYDROCHLORIDE 60 MG: 60 CAPSULE, DELAYED RELEASE ORAL at 21:22

## 2021-11-25 RX ADMIN — MAGNESIUM OXIDE TAB 400 MG (241.3 MG ELEMENTAL MG) 400 MG: 400 (241.3 MG) TAB at 08:44

## 2021-11-25 RX ADMIN — ACETAMINOPHEN 975 MG: 325 TABLET ORAL at 08:43

## 2021-11-25 RX ADMIN — Medication 3 MG: at 21:22

## 2021-11-25 RX ADMIN — AMLODIPINE BESYLATE 5 MG: 5 TABLET ORAL at 08:43

## 2021-11-25 RX ADMIN — MAGNESIUM OXIDE TAB 400 MG (241.3 MG ELEMENTAL MG) 400 MG: 400 (241.3 MG) TAB at 17:26

## 2021-11-25 RX ADMIN — OLANZAPINE 2.5 MG: 2.5 TABLET, FILM COATED ORAL at 00:15

## 2021-11-25 RX ADMIN — LIDOCAINE: 50 OINTMENT TOPICAL at 08:43

## 2021-11-25 RX ADMIN — Medication 1 TABLET: at 17:26

## 2021-11-25 RX ADMIN — CIPROFLOXACIN HYDROCHLORIDE 250 MG: 250 TABLET, FILM COATED ORAL at 18:38

## 2021-11-25 RX ADMIN — ASPIRIN 81 MG: 81 TABLET, COATED ORAL at 17:26

## 2021-11-25 RX ADMIN — FAMOTIDINE 20 MG: 20 TABLET, FILM COATED ORAL at 08:44

## 2021-11-25 RX ADMIN — BENZONATATE 200 MG: 100 CAPSULE ORAL at 08:44

## 2021-11-25 RX ADMIN — HYDRALAZINE HYDROCHLORIDE 25 MG: 25 TABLET, FILM COATED ORAL at 21:23

## 2021-11-25 RX ADMIN — POTASSIUM CHLORIDE FOR ORAL SOLUTION 40 MEQ: 1.5 POWDER, FOR SOLUTION ORAL at 03:10

## 2021-11-25 RX ADMIN — LOSARTAN POTASSIUM 100 MG: 50 TABLET, FILM COATED ORAL at 12:43

## 2021-11-25 RX ADMIN — ACETAMINOPHEN 975 MG: 325 TABLET ORAL at 21:23

## 2021-11-25 RX ADMIN — HYDRALAZINE HYDROCHLORIDE 10 MG: 20 INJECTION INTRAMUSCULAR; INTRAVENOUS at 01:28

## 2021-11-25 ASSESSMENT — ACTIVITIES OF DAILY LIVING (ADL)
ADLS_ACUITY_SCORE: 22
ADLS_ACUITY_SCORE: 18
ADLS_ACUITY_SCORE: 22
ADLS_ACUITY_SCORE: 18
ADLS_ACUITY_SCORE: 18
ADLS_ACUITY_SCORE: 22
ADLS_ACUITY_SCORE: 18
ADLS_ACUITY_SCORE: 22
ADLS_ACUITY_SCORE: 18
ADLS_ACUITY_SCORE: 22
ADLS_ACUITY_SCORE: 22
ADLS_ACUITY_SCORE: 18
ADLS_ACUITY_SCORE: 22
ADLS_ACUITY_SCORE: 18
ADLS_ACUITY_SCORE: 18

## 2021-11-25 NOTE — PLAN OF CARE
"  Problem: Hypertension Acute  Goal: Blood Pressure Within Desired Range  Outcome: No Change  BP as high as 213/91; received PRN hydralazine.  BP down to the 170's/80 range; asymptomatic.  Pt states that she is worried, feels \"like this may be it\" (referring to dying).      Problem: Confusion Chronic  Goal: Optimal Cognitive Function  Outcome: No Change   A/O to self consistently; orientation fluctuates for place, time and situation.  She has been awake all night long.    Problem: UTI (Urinary Tract Infection)  Goal: Improved Infection Symptoms  Outcome: Improving   Receiving Cipro IV.       "

## 2021-11-25 NOTE — PLAN OF CARE
Problem: Hypertension Acute  Goal: Blood Pressure Within Desired Range  Outcome: Improving   MD informed of high blood pressure. Another dose of Amlodipine ordered. Pts. Caregiver states pts. Blood pressure increases when she has a UTI.       Problem: Risk for Delirium  Goal: Improved Behavioral Control  Outcome: Improving   Pt. Has been cooperative with cares. Pt. Stated that she feels lonely after her caregiver left and would like some company. As needed oral zyprexa available.     Merly Espinosa RN

## 2021-11-25 NOTE — CONSULTS
[facility] Urology Consultation    Ani Porter MRN# [id number]   Age: 89 year old YOB: 1929     Date of Admission:  11/22/2021    Reason for consult: Urinary tract infections, recurrent       Requesting physician: DR. Ngo       Level of consult: Consult, follow and place orders           Assessment and Plan:   Assessment:   89 y/o female patient. Previously seen by Dr. Mello.  With recurring UTI      Plan:   Review of patient chart demonstrates two recent infections with different strains of kelbsiella.   According to care taker she gets 10 UTI per year.  She has a normal ct abd/pelvis from a few months ago that does not identify any source of possible bacterial sequestration.   I think this represents re-infections rather than colonization.  She will need a thorough outpatient workup including post void residual tests.  Ultimately she may benefit from long term suppressive antibiotics.  She was on amoxicillin for the last year and continued to have breakthrough.  Her long list of allergies is certainly a barrier.  She may benefit from trimethoprim 100mg daily.  Once again these are outpatient recommendations and there is currently no role for urologic intervention during this hospital stay             Chief Complaint:   Urinary tract infections, recurrent     History is obtained from the patient and patient's caregiver  89 yo female with long standing urinary complaints  Has baseline urinary incontinence  Recurring UTI  Per caregiver 10 UTI in last year.   Her symptoms of uti are frequency of urination over her baseline as well as confusion and difficulty ambulating.    She has had botox injections in the past, most recent 2 years ago.   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     Osteoarthritis      TIA (transient ischemic attack)      UTI (urinary tract infection) 9/19/2016     Vitamin B12 deficiency (non anemic) 3/11/2021    Overview:  getting monthly injections             Past Surgical History:     Past Surgical History:   Procedure Laterality Date     APPENDECTOMY       ARTHROPLASTY KNEE BILATERAL       BACK SURGERY      CAN'T REMEMBER WHAT THE SURGERY WAS FOR     EYE SURGERY      CAN'T REMEMBER WHAT KIND OF EYE SURGERY     HYSTERECTOMY       OTHER SURGICAL HISTORY      BLADDER STIMULATOR             Social History:     Social History     Tobacco Use     Smoking status: Never Smoker     Smokeless tobacco: Never Used   Substance Use Topics     Alcohol use: Yes     Alcohol/week: 2.5 standard drinks     Comment: Alcoholic Drinks/day: very rarely             Family History:             Immunizations:     Immunization History   Administered Date(s) Administered     COVID-19,PF,Moderna 02/08/2021     Flu, Unspecified 11/21/2014, 10/01/2018     Pneumococcal 23 valent 02/08/2010             Allergies:     Allergies   Allergen Reactions     Other Allergy (See Comments) [External Allergen Needs Reconciliation - See Comment] Other (See Comments)     Patient Reports Reaction to FLU SHOT - Egg allergy!!, FLU SHOT - Egg allergy, PN: FLU SHOT - Egg allergy     Citalopram Analogues [Citalopram] Unknown and Other (See Comments)     Per pt and outside records     Demeclocycline Unknown     Doxycycline Unknown and Other (See Comments)     Per pt and outside records;     Erythromycin Unknown and Other (See Comments)     Per pt and outside records;      Erythromycin Ethylsuccinate [Erythromycin] Unknown     Hydromorphone Other  (See Comments)     Depression, PN: Depression, Patient reports having depression     Iodine And Iodide Containing Products [Iodine] Other (See Comments) and Unknown     unknown, Per pt and outside records, pt's son confirms she is allergic to Iodine, PN: unknown     Sulfa (Sulfonamide Antibiotics) [Sulfa Drugs] Unknown, Other (See Comments) and Nausea and Vomiting     Confusion,, Other reaction(s): Confusion, Other (See Comments), confusion     Sulfasalazine Nausea and Vomiting     Terfenadine Unknown and Other (See Comments)     Other reaction(s): *Unknown, Per pt and outside records;     Tetracycline Other (See Comments) and Unknown     Per pt and outside records     Venlafaxine Analogues [Venlafaxine] Unknown     Dizziness and confusion     Cephalosporins Nausea and Vomiting, Itching, Other (See Comments) and Rash     Itching     Prednisone Unknown, Anxiety and Other (See Comments)     Other reaction(s): Confusion, Hallucinations, Confusion, Confusion, hallucination,             Medications:     Current Facility-Administered Medications   Medication     acetaminophen (TYLENOL) tablet 650 mg    Or     acetaminophen (TYLENOL) Suppository 650 mg     acetaminophen (TYLENOL) tablet 975 mg     amLODIPine (NORVASC) tablet 5 mg     aspirin EC tablet 81 mg     benzonatate (TESSALON) capsule 200 mg     benztropine (COGENTIN) tablet 1 mg     ciprofloxacin (CIPRO) tablet 250 mg     cyanocobalamin (VITAMIN B-12) tablet 1,000 mcg     DULoxetine (CYMBALTA) DR capsule 60 mg     famotidine (PEPCID) tablet 20 mg     guaiFENesin (ROBITUSSIN) 20 mg/mL solution 10 mL     hydrALAZINE (APRESOLINE) half-tab 12.5 mg     hydrALAZINE (APRESOLINE) injection 10 mg     levothyroxine (SYNTHROID/LEVOTHROID) tablet 50 mcg     lidocaine (XYLOCAINE) 5 % ointment     losartan (COZAAR) tablet 100 mg     magnesium oxide (MAG-OX) tablet 400 mg     melatonin tablet 3 mg     multivitamin (OCUVITE) tablet 1 tablet     ondansetron (ZOFRAN-ODT) ODT tab 4  mg    Or     ondansetron (ZOFRAN) injection 4 mg     polyethylene glycol-propylene glycol (SYSTANE ULTRA) ophthalmic solution 2 drop     QUEtiapine (SEROquel) half-tab 12.5 mg     Vitamin D3 (CHOLECALCIFEROL) tablet 2,000 Units             Review of Systems:             Data:   Reviewed recent urine cultures  Reviewed ct scan from may - no hydro, no stones      Attestation:  I have reviewed today's vital signs, notes, medications, labs and imaging.    Louis Lopez MD

## 2021-11-25 NOTE — PLAN OF CARE
Problem: Hypertension Acute  Goal: Blood Pressure Within Desired Range  Outcome: Improving   Scheduled blood pressure medications given. Changes made to scheduled hydralazine by MD. Blood pressure improved from last night.       Problem: Confusion Chronic  Goal: Optimal Cognitive Function  Outcome: Improving   Pt. Alert to self, place and situation. Pt. Has her caregiver with her during the day. When caregiver is not around, she states she is very lonely.     Merly Espinosa RN

## 2021-11-25 NOTE — PROGRESS NOTES
Assessment & Plan   89-year-old female with recurrent urinary tract infections presented with UTI, acute metabolic encephalopathy, adult failure to thrive.    Active Problems:    Delirium    Mood disorder (H)    Hypothyroidism    Impaired mobility and ADLs    UTI (urinary tract infection)    Hypomagnesemia    Falls frequently    Acute metabolic encephalopathy    Adult failure to thrive    Asymptomatic hypertensive urgency    Hypokalemia    Present on Admission:    Impaired mobility and ADLs    Falls frequently    UTI (urinary tract infection)      Acute Klebsiella UTI  -Patient with known recurrent urinary tract infections, most recent 8/2021 with Klebsiella pneumoniae.  Urine culture currently growing Klebsiella oxytoca, resistant to ampicillin but otherwise sensitive to other antibiotics.  Patient had been started on ciprofloxacin.  Patient is otherwise remained afebrile, without leukocytosis, hemodynamically stable.  -Transition IV ciprofloxacin to oral, 3 days treatment will be complete on 11/25  -Patient will need outpatient urological work-up including residual testing, evaluation for possible chronic antibiotic suppression.    Acute metabolic encephalopathy, delirium, dementia  -Patient with known baseline dementia and has been experiencing delirium and sundowning while admitted in the hospital.  Delirium likely multifactorial with UTI, hospitalization, known dementia.  -Delirium protocol  -Holding home hydroxyzine    Adult failure to thrive  -PT/OT recommending TCU, although patient is able to obtain 24-hour care at current assisted living facility.    HTN urgency  -Patient with SBP greater than 200 on multiple occasions.  Likely secondary to home blood pressure meds being held on admission.  -Losartan 100 mg p.o. daily  -Amlodipine 5 mg p.o. daily  -Increase hydralazine 25 mg p.o. twice daily    Mood disorder  -Duloxetine 60 mg p.o. q. bedtime    Hypothyroidism  -TSH 2  -Levothyroxine 50 mcg p.o. daily      Hypokalemia  -Potassium replacement protocol    Hypomagnesemia  -Magnesium replacement protocol    Thrombocytopenia  -Patient with unusual acute drop in platelets from 205-59.  Patient not on Lovenox or heparin.  No petechia or signs of acute bleeding.  And very suspicious that this is a lab error and not accurate.  -Repeat platelet    Electrolytes: Potassium 3.3  Fluids: P.o.  Diet: Low-salt  VTE prophylaxis: heparin SQ      COVID19 symptom check 11/25/2021  Fevers/Chills/myalgias: NEGATIVE TO ALL  Sick contacts/COVID19 exposure: NEGATIVE TO ALL  Cough/trouble breathing/SOB/sore throat: NEGATIVE TO ALL  Diarrhea: NEGATIVE       Subjective  Cc: confusion    Pt is new to be today.  Personally conducted extensive chart review prior to visit including labs, imaging, past provider notes and interventions.  Patient endorses feeling much better today in general but states she is very lonely.  Feels like staff is very busy and she does not get to spend as much time with people that she would like.  States that her blood pressure has been going up and down and this scares her a little bit.  Denies any chest pain, cough, shortness of breath, fevers or chills.    Review of Systems: History obtained from the patient  General ROS: negative  for  - chills, fatigue, fever  ENT ROS: negative for - headaches, hearing change, nasal congestion, nasal discharge, sore throat, tinnitus,visual changes  Allergy and Immunology ROS: positive for - hives, itchy/watery eyes and seasonal allergies  Hematological and Lymphatic ROS: negative for - bleeding problems, blood clots, bruising  Respiratory ROS: negative for - cough, pleuritic pain, shortness of breath, sputum changes  Cardiovascular ROS: negative for - chest pain, dyspnea on exertion, edema  Gastrointestinal ROS: negative for - abdominal pain, constipation, diarrhea, and nausea/vomiting  Genito-Urinary ROS: negative for - change in urinary stream, dysuria, hematuria  Musculoskeletal  ROS: negative for - gait disturbance, joint pain, joint stiffness, joint swelling and muscle pain  Neurological ROS: negative for - behavioral changes, confusion, dizziness, numbness/tingling   Dermatological ROS: negative for pruritus, rash    Objective    Physical Examination:   General appearance - alert, well appearing, and in no distress and oriented to person  Mental status - alert, oriented to person but seems to be forgetful of place and time, normal mood, behavior, speech, dress, motor activity, patient repeats herself frequently but answers all questions appropriately  HEENT - sclera anicteric, left e  Lymphatics - no palpable lymphadenopathy, no hepatosplenomegaly  Respiratory - clear to auscultation, no wheezes, rales or rhonchi, symmetric air entry, no tachypnea, retractions or cyanosis  Cardiac - normal rate, regular rhythm, normal S1, S2, no murmurs, rubs, clicks or gallops, no JVD  Abdomen - soft, nontender, nondistended, no masses or organomegaly no rebound tenderness noted bowel sounds normal, no bladder distension noted  Neurological - alert, oriented to self, normal speech, no focal findings or movement disorder noted  Musculoskeletal - no joint tenderness, deformity or swelling, full range of motion without pain  Extremities - peripheral pulses normal, no pedal edema, no clubbing or cyanosis  Skin - normal coloration and turgor, no rashes, no suspicious skin lesions noted    Temp:  [97.7  F (36.5  C)-98.5  F (36.9  C)] 98.4  F (36.9  C)  Pulse:  [63-79] 71  Resp:  [18-22] 22  BP: (131-213)/() 178/69  SpO2:  [94 %-97 %] 96 %      Intake/Output Summary (Last 24 hours) at 11/25/2021 0800  Last data filed at 11/25/2021 0652  Gross per 24 hour   Intake 440 ml   Output 550 ml   Net -110 ml       Results    Recent Results (from the past 24 hour(s))   Basic metabolic panel    Collection Time: 11/24/21 10:29 AM   Result Value Ref Range    Sodium 135 (L) 136 - 145 mmol/L    Potassium 3.3 (L) 3.5 -  5.0 mmol/L    Chloride 101 98 - 107 mmol/L    Carbon Dioxide (CO2) 24 22 - 31 mmol/L    Anion Gap 10 5 - 18 mmol/L    Urea Nitrogen 10 8 - 28 mg/dL    Creatinine 0.79 0.60 - 1.10 mg/dL    Calcium 8.9 8.5 - 10.5 mg/dL    Glucose 198 (H) 70 - 125 mg/dL    GFR Estimate 65 >60 mL/min/1.73m2   CBC with platelets    Collection Time: 11/24/21 10:29 AM   Result Value Ref Range    WBC Count 4.7 4.0 - 11.0 10e3/uL    RBC Count 3.78 (L) 3.80 - 5.20 10e6/uL    Hemoglobin 11.8 11.7 - 15.7 g/dL    Hematocrit 35.1 35.0 - 47.0 %    MCV 93 78 - 100 fL    MCH 31.2 26.5 - 33.0 pg    MCHC 33.6 31.5 - 36.5 g/dL    RDW 13.0 10.0 - 15.0 %    Platelet Count 205 150 - 450 10e3/uL   ECG 12-LEAD WITH MUSE ([facility])    Collection Time: 11/24/21  1:32 PM   Result Value Ref Range    Systolic Blood Pressure  mmHg    Diastolic Blood Pressure  mmHg    Ventricular Rate 66 BPM    Atrial Rate 66 BPM    MI Interval 170 ms    QRS Duration 80 ms     ms    QTc 429 ms    P Axis 39 degrees    R AXIS -27 degrees    T Axis -57 degrees    Interpretation ECG       Sinus rhythm  Nonspecific T wave abnormality  Abnormal ECG  When compared with ECG of 22-NOV-2021 16:36,  MI interval has decreased  QT has shortened     Troponin I    Collection Time: 11/24/21  1:50 PM   Result Value Ref Range    Troponin I 0.01 0.00 - 0.29 ng/mL   Troponin I    Collection Time: 11/24/21  6:59 PM   Result Value Ref Range    Troponin I <0.01 0.00 - 0.29 ng/mL   Potassium    Collection Time: 11/24/21  6:59 PM   Result Value Ref Range    Potassium 3.4 (L) 3.5 - 5.0 mmol/L   Magnesium    Collection Time: 11/24/21  6:59 PM   Result Value Ref Range    Magnesium 1.4 (L) 1.8 - 2.6 mg/dL   Magnesium    Collection Time: 11/24/21  6:59 PM   Result Value Ref Range    Magnesium 1.5 (L) 1.8 - 2.6 mg/dL   Troponin I    Collection Time: 11/24/21 10:35 PM   Result Value Ref Range    Troponin I 0.03 0.00 - 0.29 ng/mL   Troponin I    Collection Time: 11/25/21  1:39 AM   Result Value Ref Range     Troponin I 0.03 0.00 - 0.29 ng/mL   Potassium    Collection Time: 11/25/21  1:39 AM   Result Value Ref Range    Potassium 3.3 (L) 3.5 - 5.0 mmol/L   ECG 12-LEAD WITH MUSE ([facility])    Collection Time: 11/25/21  7:12 AM   Result Value Ref Range    Systolic Blood Pressure  mmHg    Diastolic Blood Pressure  mmHg    Ventricular Rate 92 BPM    Atrial Rate 92 BPM    HI Interval 142 ms    QRS Duration 78 ms     ms    QTc 410 ms    P Axis 29 degrees    R AXIS -37 degrees    T Axis 38 degrees    Interpretation ECG       Sinus rhythm with sinus arrhythmia with Fusion complexes  Left axis deviation  Abnormal ECG  When compared with ECG of 24-NOV-2021 13:32,  Fusion complexes are now Present  Nonspecific T wave abnormality, improved in Lateral leads            Lab Results personally reviewed 11/25  Imaging Results personally reviewed 11/25  Discussed with patient, her caregiver, and her stepson  Reviewed old records     Advanced Care Planning  Discharge Planning discussed with patient and family  Discussed care with patient and family for 35 minutes with greater than 50% of time spent in counseling and coordination of care.    Updated patient's caregiver Aliya at bedside on 11/25  Updated patient's stepson Jamie on 11/25    This note was created using dragon dictation, any spelling and grammatical errors are unintentional.

## 2021-11-25 NOTE — CONSULTS
"Spoke to step son Jamie via telephone. They are working on getting 24 hour care set up for patient and says it can be done by tomorrow \"one way or another\", whether it be family or paid caregivers. Says he can transport patient home tomorrow at 11.     Updated patient and her care giver Aliya who is at the bed side.   "

## 2021-11-26 ENCOUNTER — APPOINTMENT (OUTPATIENT)
Dept: OCCUPATIONAL THERAPY | Facility: HOSPITAL | Age: 86
DRG: 689 | End: 2021-11-26
Payer: MEDICARE

## 2021-11-26 VITALS
DIASTOLIC BLOOD PRESSURE: 77 MMHG | RESPIRATION RATE: 20 BRPM | HEART RATE: 72 BPM | OXYGEN SATURATION: 94 % | HEIGHT: 62 IN | WEIGHT: 156.7 LBS | SYSTOLIC BLOOD PRESSURE: 169 MMHG | TEMPERATURE: 98.2 F | BODY MASS INDEX: 28.84 KG/M2

## 2021-11-26 LAB
ATRIAL RATE - MUSE: 66 BPM
DIASTOLIC BLOOD PRESSURE - MUSE: NORMAL MMHG
INTERPRETATION ECG - MUSE: NORMAL
P AXIS - MUSE: 39 DEGREES
PR INTERVAL - MUSE: 170 MS
QRS DURATION - MUSE: 80 MS
QT - MUSE: 410 MS
QTC - MUSE: 429 MS
R AXIS - MUSE: -27 DEGREES
SYSTOLIC BLOOD PRESSURE - MUSE: NORMAL MMHG
T AXIS - MUSE: -57 DEGREES
VENTRICULAR RATE- MUSE: 66 BPM

## 2021-11-26 PROCEDURE — 250N000013 HC RX MED GY IP 250 OP 250 PS 637: Performed by: FAMILY MEDICINE

## 2021-11-26 PROCEDURE — 97535 SELF CARE MNGMENT TRAINING: CPT | Mod: GO

## 2021-11-26 PROCEDURE — 99239 HOSP IP/OBS DSCHRG MGMT >30: CPT | Performed by: FAMILY MEDICINE

## 2021-11-26 PROCEDURE — 250N000013 HC RX MED GY IP 250 OP 250 PS 637

## 2021-11-26 RX ORDER — AMLODIPINE BESYLATE 5 MG/1
5 TABLET ORAL DAILY
Qty: 30 TABLET | Refills: 0 | Status: SHIPPED | OUTPATIENT
Start: 2021-11-26 | End: 2022-10-06

## 2021-11-26 RX ORDER — ACETAMINOPHEN 325 MG/1
650 TABLET ORAL EVERY 4 HOURS PRN
COMMUNITY
Start: 2021-11-26 | End: 2022-10-06

## 2021-11-26 RX ORDER — LANOLIN ALCOHOL/MO/W.PET/CERES
3 CREAM (GRAM) TOPICAL AT BEDTIME
COMMUNITY
Start: 2021-11-26 | End: 2022-10-06

## 2021-11-26 RX ORDER — HYDRALAZINE HYDROCHLORIDE 25 MG/1
25 TABLET, FILM COATED ORAL 2 TIMES DAILY
Status: ON HOLD | DISCHARGE
Start: 2021-11-26 | End: 2022-10-10

## 2021-11-26 RX ADMIN — FAMOTIDINE 20 MG: 20 TABLET, FILM COATED ORAL at 09:33

## 2021-11-26 RX ADMIN — HYDRALAZINE HYDROCHLORIDE 25 MG: 25 TABLET, FILM COATED ORAL at 09:34

## 2021-11-26 RX ADMIN — AMLODIPINE BESYLATE 5 MG: 5 TABLET ORAL at 09:33

## 2021-11-26 RX ADMIN — BENZONATATE 200 MG: 100 CAPSULE ORAL at 09:33

## 2021-11-26 RX ADMIN — MAGNESIUM OXIDE TAB 400 MG (241.3 MG ELEMENTAL MG) 400 MG: 400 (241.3 MG) TAB at 09:34

## 2021-11-26 RX ADMIN — OLANZAPINE 2.5 MG: 2.5 TABLET, FILM COATED ORAL at 02:14

## 2021-11-26 RX ADMIN — Medication 1 TABLET: at 09:39

## 2021-11-26 RX ADMIN — ACETAMINOPHEN 975 MG: 325 TABLET ORAL at 09:33

## 2021-11-26 RX ADMIN — LEVOTHYROXINE SODIUM 50 MCG: 0.03 TABLET ORAL at 09:39

## 2021-11-26 ASSESSMENT — ACTIVITIES OF DAILY LIVING (ADL)
ADLS_ACUITY_SCORE: 18

## 2021-11-26 NOTE — DISCHARGE SUMMARY
[facility] MEDICINE  DISCHARGE SUMMARY  [facility] [place] [facility]     Primary Care Physician: Archana Patel  Admission Date: 11/22/2021   Discharge Provider: Cecy Castro DO Discharge Date: 11/26/2021   Diet: cardiac cellulitis remember the discharge little comfort to myself to get to walk to a warm car at the end of the day,   Code Status: No CPR- Do NOT Intubate   Activity: as tolerated        Condition at Discharge: stable      REASON FOR PRESENTATION(See Admission Note for Details)   89-year-old female with recurrent urinary tract infections presented with UTI, acute metabolic encephalopathy, adult failure to thrive.    PRINCIPAL & ACTIVE DISCHARGE DIAGNOSES     Active Problems:    Delirium    Mood disorder (H)    Hypothyroidism    Impaired mobility and ADLs    UTI (urinary tract infection)    Hypomagnesemia    Falls frequently    Acute metabolic encephalopathy    Adult failure to thrive    Asymptomatic hypertensive urgency    Hypokalemia      SIGNIFICANT FINDINGS (Imaging, labs):     IMPRESSION:  1.  No CT evidence for acute intracranial process.  2.  Brain atrophy and presumed chronic microvascular ischemic changes as above.    IMPRESSION:  1.  Cervical spondylosis. Negative for fracture or traumatic malalignment.    IMPRESSION:      Lungs are symmetrically inflated. No lung edema or, consolidation, or focal volume loss.     Cardiac silhouette is normal in size. Vascular pedicle width is normal.     No pleural fluid or pneumothorax.     Bilateral glenohumeral osteoarthrosis. Mild thoracic spine degenerative osteophytes.    PENDING LABS     [unfilled]      PROCEDURES ( this hospitalization only)          RECOMMENDATIONS TO OUTPATIENT PROVIDER FOR F/U VISIT     Follow up CXR in 4 weeks  Patient should have repeat platelet checked.  Lab ordered for 11/26 appear to have been cancelled and not collected prior to patient's discharge.  Blood pressure medication adjustments made,  will need to follow the outpatient setting for further titrations    DISPOSITION     Home with home care    SUMMARY OF HOSPITAL COURSE:      Acute Klebsiella UTI  Patient with known recurrent urinary tract infections, most recent 8/2021 with Klebsiella pneumoniae.  Urine culture currently growing Klebsiella oxytoca, resistant to ampicillin but otherwise sensitive to other antibiotics.    Patient remained hemodynamically stable, afebrile and did complete 3-day total treatment with IV and oral ciprofloxacin.  Patient will need continued outpatient neurological work-up for recurrent urinary tract infections, may follow-up with her normal urologist for this.     Acute metabolic encephalopathy, delirium, dementia  Patient with known baseline dementia and has been experiencing delirium and sundowning while admitted in the hospital.  Delirium likely multifactorial with UTI, hospitalization, known dementia.  Home hydroxyzine discontinued as can increase delirium.  Patient at baseline mentation at time of discharge.    Adult failure to thrive  PT/OT recommending TCU, although patient is able to obtain 24-hour care at current assisted living facility with help of family.     HTN urgency  Continue home losartan 100 mg p.o. daily, hydralazine increased to 25 mg p.o. twice daily and amlodipine 5 mg p.o. daily started.     Mood disorder  Duloxetine 60 mg p.o. q. bedtime     Hypothyroidism  Levothyroxine 50 mcg p.o. daily     Hypokalemia-resolved  Potassium replacement protocol     Hypomagnesemia-resolved  Magnesium replacement protocol     Thrombocytopenia  Patient with unusual acute drop in platelets from 205-59.  Patient not on Lovenox or heparin.  No petechia or signs of acute bleeding.  And very suspicious that this is a lab error and not accurate.  Unfortunately repeat platelet order was canceled and not collected prior to discharge.  Will need repeat sample in the outpatient setting.    Discharge Medications with Med  changes:        Review of your medicines      START taking      Dose / Directions   amLODIPine 5 MG tablet  Commonly known as: NORVASC  Used for: Asymptomatic hypertensive urgency      Dose: 5 mg  Take 1 tablet (5 mg) by mouth daily  Quantity: 30 tablet  Refills: 0     melatonin 3 MG tablet  Used for: Sleep disorder      Dose: 3 mg  Take 1 tablet (3 mg) by mouth At Bedtime  Refills: 0        CONTINUE these medicines which may have CHANGED, or have new prescriptions. If we are uncertain of the size of tablets/capsules you have at home, strength may be listed as something that might have changed.      Dose / Directions   * acetaminophen 500 MG tablet  Commonly known as: TYLENOL  This may have changed: Another medication with the same name was added. Make sure you understand how and when to take each.      Dose: 1,000 mg  Take 1,000 mg by mouth 2 times daily  Refills: 0     * acetaminophen 325 MG tablet  Commonly known as: TYLENOL  This may have changed: You were already taking a medication with the same name, and this prescription was added. Make sure you understand how and when to take each.  Used for: Spinal stenosis, lumbar region, without neurogenic claudication      Dose: 650 mg  Take 2 tablets (650 mg) by mouth every 4 hours as needed for mild pain  Refills: 0     hydrALAZINE 25 MG tablet  Commonly known as: APRESOLINE  This may have changed: how much to take  Used for: Asymptomatic hypertensive urgency      Dose: 25 mg  Take 1 tablet (25 mg) by mouth 2 times daily With lunch and at bedtime.  Refills: 0         * This list has 2 medication(s) that are the same as other medications prescribed for you. Read the directions carefully, and ask your doctor or other care provider to review them with you.            CONTINUE these medicines which have NOT CHANGED      Dose / Directions   albuterol 108 (90 Base) MCG/ACT inhaler  Commonly known as: PROAIR HFA/PROVENTIL HFA/VENTOLIN HFA      Dose: 1-2 puff  Inhale 1-2  puffs into the lungs every 4 hours as needed  Refills: 0     aspirin 81 MG EC tablet  Commonly known as: ASA      Dose: 81 mg  Take 81 mg by mouth daily (with dinner)  Refills: 0     benzonatate 200 MG capsule  Commonly known as: TESSALON      Dose: 200 mg  Take 200 mg by mouth 2 times daily  Refills: 0     cholecalciferol 25 MCG (1000 UT) Tabs      Dose: 2,000 Units  Take 2,000 Units by mouth daily (with lunch)  Refills: 0     DULoxetine 60 MG capsule  Commonly known as: CYMBALTA      Dose: 60 mg  Take 60 mg by mouth At Bedtime  Refills: 0     famotidine 20 MG tablet  Commonly known as: PEPCID      Dose: 20 mg  Take 20 mg by mouth 2 times daily With lunch and at bedtime.  Refills: 0     levothyroxine 50 MCG tablet  Commonly known as: SYNTHROID/LEVOTHROID      Dose: 50 mcg  Take 50 mcg by mouth daily before breakfast  Refills: 0     loperamide 2 MG capsule  Commonly known as: IMODIUM      Dose: 2 mg  Take 2 mg by mouth 4 times daily as needed  Refills: 0     losartan 100 MG tablet  Commonly known as: COZAAR      Dose: 100 mg  Take 100 mg by mouth daily (with lunch)  Refills: 0     medical cannabis  (Patient's own supply)      Dose: 1 Dose  Take 1 Dose by mouth See Admin Instructions (The purpose of this order is to document that the patient reports taking medical cannabis.  This is not a prescription, and is not used to certify that the patient has a qualifying medical condition.)    Tangerine capsule  Take 1 capsule in the morning and 1 hour prior to sleep with milk    Emulsion with supper  Refills: 0     polyethylene glycol 17 GM/Dose powder  Commonly known as: MIRALAX      Dose: 1 capful  Take 1 capful by mouth 2 times daily as needed for constipation  Refills: 0     * PreserVision AREDS Caps      Dose: 1 capsule  Take 1 capsule by mouth 2 times daily (with meals) With lunch and supper.  Refills: 0     * CENTRUM SILVER 50+WOMEN PO      Dose: 1 tablet  Take 1 tablet by mouth daily (with dinner)  Refills: 0      PROBIOTIC ACIDOPHILUS PO      Dose: 1 capsule  Take 1 capsule by mouth daily (with dinner)  Refills: 0     Systane 0.4-0.3 % Soln ophthalmic solution  Generic drug: polyethylene glycol-propylene glycol      Dose: 2 drop  Place 2 drops into both eyes 3 times daily as needed  Refills: 0     vitamin B-12 1000 MCG tablet  Commonly known as: CYANOCOBALAMIN      Dose: 1,000 mcg  Take 1,000 mcg by mouth daily (with lunch)  Refills: 0         * This list has 2 medication(s) that are the same as other medications prescribed for you. Read the directions carefully, and ask your doctor or other care provider to review them with you.            STOP taking    hydrOXYzine 25 MG tablet  Commonly known as: ATARAX        ibuprofen 200 MG tablet  Commonly known as: ADVIL/MOTRIN              Where to get your medicines      These medications were sent to [facility] [address]  [address] [facility] [address]    Phone: [phone]     amLODIPine 5 MG tablet     Some of these will need a paper prescription and others can be bought over the counter. Ask your nurse if you have questions.    You don't need a prescription for these medications    acetaminophen 325 MG tablet    melatonin 3 MG tablet             Rationale for medication changes:      Hydroxyzine discontinued as may add to encephalopathy        Consults   none      Immunizations given this encounter     [unfilled]       Anticoagulation Information      Recent INR results: No results for input(s): INR in the last 168 hours.  Warfarin doses (if applicable) or name of other anticoagulant: none      Discharge Orders     Discharge Procedure Orders   Home care nursing referral   Referral Priority: Routine Referral Type: Home Health Therapies & Aides   Number of Visits Requested: 1     Home Care PT Referral for Hospital Discharge   Referral Priority: Routine Referral Type: Home Health Therapies & Aides   Number of Visits  Requested: 1     Home Care OT Referral for Hospital Discharge   Referral Priority: Routine Referral Type: Consultation   Number of Visits Requested: 1     Reason for your hospital stay   Order Comments: Patient admitted for encephalopathy and UTI     Follow-up and recommended labs and tests    Order Comments: Follow up with primary care provider, Archana Patel, within 7 days for hospital follow- up.  No follow up labs or test are needed.    RN discharge follow up clinic visit ([facility] clinics only) within 7 days.  Would also recommend follow up with your general urologist for continued work up and evaluation of recurrent urinary tract infections     Activity   Order Comments: Your activity upon discharge: activity as tolerated     Order Specific Question Answer Comments   Is discharge order? Yes      MD face to face encounter   Order Comments: Documentation of Face to Face and Certification for Home Health Services    I certify that patient: Ani Porter is under my care and that I, or a nurse practitioner or physician's assistant working with me, had a face-to-face encounter that meets the physician face-to-face encounter requirements with this patient on: 11/26/2021.    This encounter with the patient was in whole, or in part, for the following medical condition, which is the primary reason for home health care: patient with dementia, recurrent UTI's, in need of 24 hours care and supervision.    I certify that, based on my findings, the following services are medically necessary home health services: Nursing, Occupational Therapy, and Physical Therapy.    My clinical findings support the need for the above services because: Nurse is needed: To provide assessment and oversight required in the home to assure adherence to the medical plan due to: dementia. and To provide caregiver training to assist with: hypertension management, infection prevention as well as identification.., Occupational Therapy Services  "are needed to assess and treat cognitive ability and address ADL safety due to impairment in abilities to care for herself secondary to dementia., and Physical Therapy Services are needed to assess and treat the following functional impairments: weakness, balance and incoordination.    Further, I certify that my clinical findings support that this patient is homebound (i.e. absences from home require considerable and taxing effort and are for medical reasons or [employer] services or infrequently or of short duration when for other reasons) because: Requires assistance of another person or specialized equipment to access medical services because patient: Is prone to wander/get lost without assistance. and Is unable to exit home safely on own due to: weakness and limited mobility...    Based on the above findings. I certify that this patient is confined to the home and needs intermittent skilled nursing care, physical therapy and/or speech therapy.  The patient is under my care, and I have initiated the establishment of the plan of care.  This patient will be followed by a physician who will periodically review the plan of care.  Physician/Provider to provide follow up care: Archana Patel    Attending hospital physician (the Medicare certified [place] provider): Cecy Castro DO  Physician Signature: See electronic signature associated with these discharge orders.  Date: 11/26/2021     Diet   Order Comments: Follow this diet upon discharge: Orders Placed This Encounter      Combination Diet Regular Diet Adult; 2 gm NA Diet     Order Specific Question Answer Comments   Is discharge order? Yes      Examination     Vital signs:  Temp: 98.3  F (36.8  C) Temp src: Oral BP: (!) 145/72 Pulse: 79   Resp: 16 SpO2: 93 % O2 Device: None (Room air)   Height: 157.5 cm (5' 2\") Weight: 71.1 kg (156 lb 11.2 oz)  Estimated body mass index is 28.66 kg/m  as calculated from the following:    Height as of this encounter: 1.575 m (5' " "2\").    Weight as of this encounter: 71.1 kg (156 lb 11.2 oz).         Physical Examination:   General appearance - alert, well appearing, and in no distress and oriented to person  Mental status - alert, oriented to person but seems to be forgetful of place and time, normal mood, behavior, speech, dress, motor activity, patient repeats herself frequently but answers all questions appropriately  HEENT - sclera anicteric, left eye and right eye normal self  Lymphatics - no palpable lymphadenopathy, no hepatosplenomegaly  Respiratory - clear to auscultation, no wheezes, rales or rhonchi, symmetric air entry, no tachypnea, retractions or cyanosis  Cardiac - normal rate, regular rhythm, normal S1, S2, no murmurs, rubs, clicks or gallops, no JVD  Abdomen - soft, nontender, nondistended, no masses or organomegaly no rebound tenderness noted bowel sounds normal, no bladder distension noted  Neurological - alert, oriented to self, normal speech, no focal findings or movement disorder noted  Musculoskeletal - no joint tenderness, deformity or swelling, full range of motion without pain  Extremities - peripheral pulses normal, no pedal edema, no clubbing or cyanosis  Skin - normal coloration and turgor, no rashes, no suspicious skin lesions noted      Please see EMR for more detailed significant labs, imaging, consultant notes etc.  Total time spent on discharge: 50 minutes    Cecy Castro DO    [facility] Service: Ph:[phone]    CC:Archana Patel    "

## 2021-11-26 NOTE — PLAN OF CARE
Problem: Hypertension Acute  Goal: Blood Pressure Within Desired Range  Outcome: improving ; BP in the 140's/60's.    Problem: Confusion Chronic  Goal: Optimal Cognitive Function  Outcome: No Change   A/O to self consistently; orientation fluctuates for place, time and situation.    Pt was given seroquel for agitation; ineffective. Zyprexa given with good results. She has been sleeping since administration of Zyprexa.

## 2021-11-26 NOTE — PROGRESS NOTES
"Care Management Discharge Note    Discharge Date: 11/26/2021       Discharge Disposition:   home    Discharge Transportation:   Jamie Rosas    Per  note 11/24/21, \"Call from step-son Jamie. Patient declining TCU discharge option. Plan is home with family assist and 24 hour supervision/increased private pay home care services with [facility]. Currently pt has services with [phone] with [facility]. Step son will call [facility] and arrange for increased services to provide for 24 hr assist/supervision.\"         "

## 2021-11-27 DIAGNOSIS — Z71.89 OTHER SPECIFIED COUNSELING: ICD-10-CM

## 2021-11-29 ENCOUNTER — PATIENT OUTREACH (OUTPATIENT)
Dept: CARE COORDINATION | Facility: CLINIC | Age: 86
End: 2021-11-29
Payer: COMMERCIAL

## 2021-11-29 NOTE — PROGRESS NOTES
"Clinic Care Coordination Contact  [facility]: Post-Discharge Note  SITUATION                                                      Admission:    Admission Date: 11/22/21   Reason for Admission: Urinary Tract Infection, acute metabolic encephalopathy, adult failure to thrive  Discharge:   Discharge Date: 11/26/21  Discharge Diagnosis: Urinary Tract Infection, acute metabolic encephalopathy, adult failure to thrive    BACKGROUND                                                         Ani Porter is a 89-year-old female with a history of TIA, mild dementia, heart failure, recurrent UTIs, hypertension, hypothyroidism, urinary incontinence that presents with weakness and lightheadedness.       Patient lives independently at home with caregiver that comes for days a week for the past 5 years.  Patient had a strong urine smell about her and reports patient fell over the weekend a couple times.  Patient is more confused than normal which is usually consistent with her recurrence of UTI.  Patient's been inching towards requiring more care.       Patient is a poor historian and cannot recall her own birthday.  Keeps perseverating on the fact that the pubic looks like a \"weiner.\"  She does say she gets more confused when she has a urinary tract infection.  Denies any burning or fever or chills.  She would like to return home at discharge.    ASSESSMENT      Enrollment  Primary Care Care Coordination Status: Not a Candidate    Discharge Assessment  How are you doing now that you are home?: She is definietly walking better, she is weak and confused but a little better than when she went in. Patient is moving to assisted living at the end of December.  How are your symptoms? (Red Flag symptoms escalate to triage hotline per guidelines): Unchanged  Do you feel your condition is stable enough to be safe at home until your provider visit?: Yes (Aliya is concerned that her 24 hr care is over this week.)  Does the patient " have their discharge instructions? : Yes  Does the patient have questions regarding their discharge instructions? : No  Were you started on any new medications or were there changes to any of your previous medications? : Yes  Does the patient have all of their medications?: Yes  Do you have questions regarding any of your medications? : Yes (see comment)  Do you have all of your needed medical supplies or equipment (DME)?  (i.e. oxygen tank, CPAP, cane, etc.): Yes  Discharge follow-up appointment scheduled within 14 calendar days? : No  Is patient agreeable to assistance with scheduling? : Yes    Post-op ([facility] Only)  If the patient had a surgery or procedure, do they have any questions for a nurse?: No (CHW gave care giver phone number for [facility] to speak with nurse triage if needed.)     CHW recommended to care giver that there may be care coordination services with the patients primary care clinic, to get additional care services (24 hours care) and bed rail to prevent patient from falling out of bad. Aliya stated she has one bed rail and that the bed is close to the floor but that she would benefit from a second bed rail.    PLAN                                                      Outpatient Plan:      Follow up with primary care provider, Archana Patel, within 7 days for hospital follow- up. No follow up labs or test are  needed.  RN discharge follow up clinic visit ([facility] clinics only) within 7 days.  Would also recommend follow up with your general urologist for continued work up and evaluation of recurrent urinary  tract infections.    No future appointments.      For any urgent concerns, please contact our 24 hour nurse triage line: [phone] ([phone])       Kristine Vance  Community Health Worker  [facility]  Ph: [phone]

## 2022-01-03 ENCOUNTER — LAB REQUISITION (OUTPATIENT)
Dept: LAB | Facility: CLINIC | Age: 87
End: 2022-01-03
Payer: MEDICARE

## 2022-01-03 DIAGNOSIS — Z03.818 ENCOUNTER FOR OBSERVATION FOR SUSPECTED EXPOSURE TO OTHER BIOLOGICAL AGENTS RULED OUT: ICD-10-CM

## 2022-01-04 PROCEDURE — U0005 INFEC AGEN DETEC AMPLI PROBE: HCPCS | Mod: ORL | Performed by: FAMILY MEDICINE

## 2022-01-05 LAB — SARS-COV-2 RNA RESP QL NAA+PROBE: POSITIVE

## 2022-01-06 ENCOUNTER — LAB REQUISITION (OUTPATIENT)
Dept: LAB | Facility: CLINIC | Age: 87
End: 2022-01-06

## 2022-01-06 DIAGNOSIS — Z03.818 ENCOUNTER FOR OBSERVATION FOR SUSPECTED EXPOSURE TO OTHER BIOLOGICAL AGENTS RULED OUT: ICD-10-CM

## 2022-01-06 PROCEDURE — U0005 INFEC AGEN DETEC AMPLI PROBE: HCPCS | Mod: ORL | Performed by: FAMILY MEDICINE

## 2022-01-07 ENCOUNTER — LAB REQUISITION (OUTPATIENT)
Dept: LAB | Facility: CLINIC | Age: 87
End: 2022-01-07
Payer: MEDICARE

## 2022-01-07 DIAGNOSIS — Z03.818 ENCOUNTER FOR OBSERVATION FOR SUSPECTED EXPOSURE TO OTHER BIOLOGICAL AGENTS RULED OUT: ICD-10-CM

## 2022-01-07 LAB — SARS-COV-2 RNA RESP QL NAA+PROBE: NEGATIVE

## 2022-01-11 PROCEDURE — U0005 INFEC AGEN DETEC AMPLI PROBE: HCPCS | Mod: ORL | Performed by: FAMILY MEDICINE

## 2022-01-13 LAB — SARS-COV-2 RNA RESP QL NAA+PROBE: POSITIVE

## 2022-04-21 ENCOUNTER — LAB REQUISITION (OUTPATIENT)
Dept: LAB | Facility: CLINIC | Age: 87
End: 2022-04-21
Payer: MEDICARE

## 2022-04-21 DIAGNOSIS — Z03.818 ENCOUNTER FOR OBSERVATION FOR SUSPECTED EXPOSURE TO OTHER BIOLOGICAL AGENTS RULED OUT: ICD-10-CM

## 2022-04-22 PROCEDURE — U0003 INFECTIOUS AGENT DETECTION BY NUCLEIC ACID (DNA OR RNA); SEVERE ACUTE RESPIRATORY SYNDROME CORONAVIRUS 2 (SARS-COV-2) (CORONAVIRUS DISEASE [COVID-19]), AMPLIFIED PROBE TECHNIQUE, MAKING USE OF HIGH THROUGHPUT TECHNOLOGIES AS DESCRIBED BY CMS-2020-01-R: HCPCS | Mod: ORL | Performed by: FAMILY MEDICINE

## 2022-04-23 LAB — SARS-COV-2 RNA RESP QL NAA+PROBE: NEGATIVE

## 2022-04-27 ENCOUNTER — LAB REQUISITION (OUTPATIENT)
Dept: LAB | Facility: CLINIC | Age: 87
End: 2022-04-27
Payer: MEDICARE

## 2022-04-27 DIAGNOSIS — Z03.818 ENCOUNTER FOR OBSERVATION FOR SUSPECTED EXPOSURE TO OTHER BIOLOGICAL AGENTS RULED OUT: ICD-10-CM

## 2022-04-29 LAB — SARS-COV-2 RNA RESP QL NAA+PROBE: NEGATIVE

## 2022-04-29 PROCEDURE — U0003 INFECTIOUS AGENT DETECTION BY NUCLEIC ACID (DNA OR RNA); SEVERE ACUTE RESPIRATORY SYNDROME CORONAVIRUS 2 (SARS-COV-2) (CORONAVIRUS DISEASE [COVID-19]), AMPLIFIED PROBE TECHNIQUE, MAKING USE OF HIGH THROUGHPUT TECHNOLOGIES AS DESCRIBED BY CMS-2020-01-R: HCPCS | Mod: ORL | Performed by: FAMILY MEDICINE

## 2022-05-04 ENCOUNTER — LAB REQUISITION (OUTPATIENT)
Dept: LAB | Facility: CLINIC | Age: 87
End: 2022-05-04
Payer: MEDICARE

## 2022-05-04 DIAGNOSIS — Z03.818 ENCOUNTER FOR OBSERVATION FOR SUSPECTED EXPOSURE TO OTHER BIOLOGICAL AGENTS RULED OUT: ICD-10-CM

## 2022-05-05 PROCEDURE — U0003 INFECTIOUS AGENT DETECTION BY NUCLEIC ACID (DNA OR RNA); SEVERE ACUTE RESPIRATORY SYNDROME CORONAVIRUS 2 (SARS-COV-2) (CORONAVIRUS DISEASE [COVID-19]), AMPLIFIED PROBE TECHNIQUE, MAKING USE OF HIGH THROUGHPUT TECHNOLOGIES AS DESCRIBED BY CMS-2020-01-R: HCPCS | Mod: ORL | Performed by: FAMILY MEDICINE

## 2022-05-06 LAB — SARS-COV-2 RNA RESP QL NAA+PROBE: NEGATIVE

## 2022-05-11 ENCOUNTER — LAB REQUISITION (OUTPATIENT)
Dept: LAB | Facility: CLINIC | Age: 87
End: 2022-05-11
Payer: MEDICARE

## 2022-05-11 DIAGNOSIS — Z03.818 ENCOUNTER FOR OBSERVATION FOR SUSPECTED EXPOSURE TO OTHER BIOLOGICAL AGENTS RULED OUT: ICD-10-CM

## 2022-05-12 PROCEDURE — U0005 INFEC AGEN DETEC AMPLI PROBE: HCPCS | Mod: ORL | Performed by: FAMILY MEDICINE

## 2022-05-13 LAB — SARS-COV-2 RNA RESP QL NAA+PROBE: NEGATIVE

## 2022-05-17 ENCOUNTER — LAB REQUISITION (OUTPATIENT)
Dept: LAB | Facility: CLINIC | Age: 87
End: 2022-05-17
Payer: MEDICARE

## 2022-05-17 DIAGNOSIS — Z03.818 ENCOUNTER FOR OBSERVATION FOR SUSPECTED EXPOSURE TO OTHER BIOLOGICAL AGENTS RULED OUT: ICD-10-CM

## 2022-05-19 PROCEDURE — U0005 INFEC AGEN DETEC AMPLI PROBE: HCPCS | Mod: ORL | Performed by: FAMILY MEDICINE

## 2022-05-20 LAB — SARS-COV-2 RNA RESP QL NAA+PROBE: NEGATIVE

## 2022-10-06 ENCOUNTER — APPOINTMENT (OUTPATIENT)
Dept: RADIOLOGY | Facility: HOSPITAL | Age: 87
End: 2022-10-06
Attending: EMERGENCY MEDICINE
Payer: MEDICARE

## 2022-10-06 ENCOUNTER — APPOINTMENT (OUTPATIENT)
Dept: CT IMAGING | Facility: HOSPITAL | Age: 87
End: 2022-10-06
Attending: EMERGENCY MEDICINE
Payer: MEDICARE

## 2022-10-06 ENCOUNTER — HOSPITAL ENCOUNTER (EMERGENCY)
Facility: HOSPITAL | Age: 87
Discharge: ANOTHER HOSPITAL | End: 2022-10-06
Attending: EMERGENCY MEDICINE | Admitting: EMERGENCY MEDICINE
Payer: MEDICARE

## 2022-10-06 ENCOUNTER — HOSPITAL ENCOUNTER (INPATIENT)
Facility: CLINIC | Age: 87
LOS: 4 days | Discharge: HOME HEALTH | DRG: 091 | End: 2022-10-10
Attending: INTERNAL MEDICINE | Admitting: INTERNAL MEDICINE
Payer: MEDICARE

## 2022-10-06 VITALS
OXYGEN SATURATION: 96 % | SYSTOLIC BLOOD PRESSURE: 146 MMHG | TEMPERATURE: 98.2 F | RESPIRATION RATE: 15 BRPM | DIASTOLIC BLOOD PRESSURE: 82 MMHG | HEART RATE: 63 BPM

## 2022-10-06 DIAGNOSIS — I16.0 ASYMPTOMATIC HYPERTENSIVE URGENCY: ICD-10-CM

## 2022-10-06 DIAGNOSIS — J18.9 PNEUMONIA DUE TO INFECTIOUS ORGANISM, UNSPECIFIED LATERALITY, UNSPECIFIED PART OF LUNG: ICD-10-CM

## 2022-10-06 DIAGNOSIS — R41.82 ALTERED MENTAL STATUS, UNSPECIFIED ALTERED MENTAL STATUS TYPE: ICD-10-CM

## 2022-10-06 PROBLEM — G93.40 ACUTE ENCEPHALOPATHY: Status: ACTIVE | Noted: 2020-10-28

## 2022-10-06 LAB
ALBUMIN SERPL BCG-MCNC: 4.1 G/DL (ref 3.5–5.2)
ALBUMIN UR-MCNC: 70 MG/DL
ALP SERPL-CCNC: 53 U/L (ref 35–104)
ALT SERPL W P-5'-P-CCNC: 15 U/L (ref 10–35)
AMMONIA PLAS-SCNC: 14 UMOL/L (ref 11–51)
ANION GAP SERPL CALCULATED.3IONS-SCNC: 14 MMOL/L (ref 7–15)
APPEARANCE UR: ABNORMAL
AST SERPL W P-5'-P-CCNC: 27 U/L (ref 10–35)
BACTERIA #/AREA URNS HPF: ABNORMAL /HPF
BASOPHILS # BLD AUTO: 0 10E3/UL (ref 0–0.2)
BASOPHILS NFR BLD AUTO: 0 %
BILIRUB SERPL-MCNC: 0.7 MG/DL
BILIRUB UR QL STRIP: NEGATIVE
BUN SERPL-MCNC: 19.6 MG/DL (ref 8–23)
CALCIUM SERPL-MCNC: 9.4 MG/DL (ref 8.2–9.6)
CHLORIDE SERPL-SCNC: 95 MMOL/L (ref 98–107)
COLOR UR AUTO: YELLOW
CREAT SERPL-MCNC: 0.77 MG/DL (ref 0.51–0.95)
DEPRECATED HCO3 PLAS-SCNC: 27 MMOL/L (ref 22–29)
EOSINOPHIL # BLD AUTO: 0.1 10E3/UL (ref 0–0.7)
EOSINOPHIL NFR BLD AUTO: 2 %
ERYTHROCYTE [DISTWIDTH] IN BLOOD BY AUTOMATED COUNT: 13.2 % (ref 10–15)
GFR SERPL CREATININE-BSD FRML MDRD: 72 ML/MIN/1.73M2
GLUCOSE SERPL-MCNC: 165 MG/DL (ref 70–99)
GLUCOSE UR STRIP-MCNC: NEGATIVE MG/DL
HCT VFR BLD AUTO: 38.6 % (ref 35–47)
HGB BLD-MCNC: 12.8 G/DL (ref 11.7–15.7)
HGB UR QL STRIP: NEGATIVE
HOLD SPECIMEN: NORMAL
HOLD SPECIMEN: NORMAL
IMM GRANULOCYTES # BLD: 0 10E3/UL
IMM GRANULOCYTES NFR BLD: 0 %
KETONES UR STRIP-MCNC: NEGATIVE MG/DL
LEUKOCYTE ESTERASE UR QL STRIP: ABNORMAL
LYMPHOCYTES # BLD AUTO: 1.4 10E3/UL (ref 0.8–5.3)
LYMPHOCYTES NFR BLD AUTO: 28 %
MCH RBC QN AUTO: 31.5 PG (ref 26.5–33)
MCHC RBC AUTO-ENTMCNC: 33.2 G/DL (ref 31.5–36.5)
MCV RBC AUTO: 95 FL (ref 78–100)
MONOCYTES # BLD AUTO: 0.4 10E3/UL (ref 0–1.3)
MONOCYTES NFR BLD AUTO: 8 %
MUCOUS THREADS #/AREA URNS LPF: PRESENT /LPF
NEUTROPHILS # BLD AUTO: 3 10E3/UL (ref 1.6–8.3)
NEUTROPHILS NFR BLD AUTO: 62 %
NITRATE UR QL: NEGATIVE
NRBC # BLD AUTO: 0 10E3/UL
NRBC BLD AUTO-RTO: 0 /100
PH UR STRIP: 6 [PH] (ref 5–7)
PLATELET # BLD AUTO: 257 10E3/UL (ref 150–450)
POTASSIUM SERPL-SCNC: 4.1 MMOL/L (ref 3.4–5.3)
PROT SERPL-MCNC: 6.5 G/DL (ref 6.4–8.3)
RBC # BLD AUTO: 4.06 10E6/UL (ref 3.8–5.2)
RBC URINE: 0 /HPF
SARS-COV-2 RNA RESP QL NAA+PROBE: NEGATIVE
SODIUM SERPL-SCNC: 136 MMOL/L (ref 136–145)
SP GR UR STRIP: 1.02 (ref 1–1.03)
SQUAMOUS EPITHELIAL: <1 /HPF
UROBILINOGEN UR STRIP-MCNC: <2 MG/DL
WBC # BLD AUTO: 4.9 10E3/UL (ref 4–11)
WBC URINE: 2 /HPF

## 2022-10-06 PROCEDURE — 250N000013 HC RX MED GY IP 250 OP 250 PS 637: Performed by: INTERNAL MEDICINE

## 2022-10-06 PROCEDURE — 250N000013 HC RX MED GY IP 250 OP 250 PS 637: Performed by: NURSE PRACTITIONER

## 2022-10-06 PROCEDURE — 99207 PR APP CREDIT; MD BILLING SHARED VISIT: CPT | Performed by: NURSE PRACTITIONER

## 2022-10-06 PROCEDURE — 87040 BLOOD CULTURE FOR BACTERIA: CPT | Performed by: EMERGENCY MEDICINE

## 2022-10-06 PROCEDURE — 82040 ASSAY OF SERUM ALBUMIN: CPT | Performed by: EMERGENCY MEDICINE

## 2022-10-06 PROCEDURE — 120N000002 HC R&B MED SURG/OB UMMC

## 2022-10-06 PROCEDURE — 258N000003 HC RX IP 258 OP 636: Performed by: EMERGENCY MEDICINE

## 2022-10-06 PROCEDURE — 71046 X-RAY EXAM CHEST 2 VIEWS: CPT

## 2022-10-06 PROCEDURE — 85014 HEMATOCRIT: CPT | Performed by: EMERGENCY MEDICINE

## 2022-10-06 PROCEDURE — 36415 COLL VENOUS BLD VENIPUNCTURE: CPT | Performed by: EMERGENCY MEDICINE

## 2022-10-06 PROCEDURE — 99285 EMERGENCY DEPT VISIT HI MDM: CPT | Mod: 25

## 2022-10-06 PROCEDURE — 81001 URINALYSIS AUTO W/SCOPE: CPT | Performed by: EMERGENCY MEDICINE

## 2022-10-06 PROCEDURE — 99223 1ST HOSP IP/OBS HIGH 75: CPT | Mod: AI | Performed by: INTERNAL MEDICINE

## 2022-10-06 PROCEDURE — 82140 ASSAY OF AMMONIA: CPT | Performed by: EMERGENCY MEDICINE

## 2022-10-06 PROCEDURE — 80053 COMPREHEN METABOLIC PANEL: CPT | Performed by: EMERGENCY MEDICINE

## 2022-10-06 PROCEDURE — 250N000011 HC RX IP 250 OP 636: Performed by: EMERGENCY MEDICINE

## 2022-10-06 PROCEDURE — C9803 HOPD COVID-19 SPEC COLLECT: HCPCS

## 2022-10-06 PROCEDURE — 96361 HYDRATE IV INFUSION ADD-ON: CPT

## 2022-10-06 PROCEDURE — 96366 THER/PROPH/DIAG IV INF ADDON: CPT

## 2022-10-06 PROCEDURE — G1010 CDSM STANSON: HCPCS

## 2022-10-06 PROCEDURE — 96375 TX/PRO/DX INJ NEW DRUG ADDON: CPT

## 2022-10-06 PROCEDURE — U0005 INFEC AGEN DETEC AMPLI PROBE: HCPCS | Performed by: EMERGENCY MEDICINE

## 2022-10-06 PROCEDURE — 96365 THER/PROPH/DIAG IV INF INIT: CPT

## 2022-10-06 RX ORDER — LORAZEPAM 2 MG/ML
0.5 INJECTION INTRAMUSCULAR ONCE
Status: COMPLETED | OUTPATIENT
Start: 2022-10-06 | End: 2022-10-06

## 2022-10-06 RX ORDER — ACETAMINOPHEN 500 MG
1000 TABLET ORAL 2 TIMES DAILY
Status: DISCONTINUED | OUTPATIENT
Start: 2022-10-06 | End: 2022-10-10 | Stop reason: HOSPADM

## 2022-10-06 RX ORDER — HYDRALAZINE HYDROCHLORIDE 25 MG/1
50 TABLET, FILM COATED ORAL 2 TIMES DAILY
Status: DISCONTINUED | OUTPATIENT
Start: 2022-10-06 | End: 2022-10-10 | Stop reason: HOSPADM

## 2022-10-06 RX ORDER — LEVOTHYROXINE SODIUM 50 UG/1
50 TABLET ORAL
Status: DISCONTINUED | OUTPATIENT
Start: 2022-10-07 | End: 2022-10-10 | Stop reason: HOSPADM

## 2022-10-06 RX ORDER — FLUTICASONE PROPIONATE 50 MCG
1 SPRAY, SUSPENSION (ML) NASAL DAILY
COMMUNITY

## 2022-10-06 RX ORDER — DULOXETIN HYDROCHLORIDE 60 MG/1
60 CAPSULE, DELAYED RELEASE ORAL AT BEDTIME
Status: DISCONTINUED | OUTPATIENT
Start: 2022-10-06 | End: 2022-10-10 | Stop reason: HOSPADM

## 2022-10-06 RX ORDER — IBUPROFEN 200 MG
200 TABLET ORAL
COMMUNITY

## 2022-10-06 RX ORDER — METOPROLOL SUCCINATE 50 MG/1
50 TABLET, EXTENDED RELEASE ORAL DAILY
COMMUNITY

## 2022-10-06 RX ORDER — ALBUTEROL SULFATE 90 UG/1
1-2 AEROSOL, METERED RESPIRATORY (INHALATION) EVERY 4 HOURS PRN
Status: DISCONTINUED | OUTPATIENT
Start: 2022-10-06 | End: 2022-10-10 | Stop reason: HOSPADM

## 2022-10-06 RX ORDER — IBUPROFEN 200 MG
400 TABLET ORAL EVERY EVENING
Status: DISCONTINUED | OUTPATIENT
Start: 2022-10-06 | End: 2022-10-08

## 2022-10-06 RX ORDER — QUETIAPINE FUMARATE 25 MG/1
25 TABLET, FILM COATED ORAL AT BEDTIME
COMMUNITY

## 2022-10-06 RX ORDER — PIPERACILLIN SODIUM, TAZOBACTAM SODIUM 3; .375 G/15ML; G/15ML
3.38 INJECTION, POWDER, LYOPHILIZED, FOR SOLUTION INTRAVENOUS ONCE
Status: COMPLETED | OUTPATIENT
Start: 2022-10-06 | End: 2022-10-06

## 2022-10-06 RX ORDER — PROCHLORPERAZINE MALEATE 5 MG
5 TABLET ORAL EVERY 6 HOURS PRN
Status: DISCONTINUED | OUTPATIENT
Start: 2022-10-06 | End: 2022-10-10 | Stop reason: HOSPADM

## 2022-10-06 RX ORDER — CETIRIZINE HYDROCHLORIDE 10 MG/1
10 TABLET ORAL DAILY PRN
COMMUNITY

## 2022-10-06 RX ORDER — ONDANSETRON 2 MG/ML
4 INJECTION INTRAMUSCULAR; INTRAVENOUS EVERY 6 HOURS PRN
Status: DISCONTINUED | OUTPATIENT
Start: 2022-10-06 | End: 2022-10-10 | Stop reason: HOSPADM

## 2022-10-06 RX ORDER — QUETIAPINE FUMARATE 25 MG/1
25 TABLET, FILM COATED ORAL AT BEDTIME
Status: DISCONTINUED | OUTPATIENT
Start: 2022-10-06 | End: 2022-10-10 | Stop reason: HOSPADM

## 2022-10-06 RX ORDER — IBUPROFEN 200 MG
600 TABLET ORAL EVERY MORNING
Status: DISCONTINUED | OUTPATIENT
Start: 2022-10-07 | End: 2022-10-08

## 2022-10-06 RX ORDER — ASPIRIN 81 MG/1
81 TABLET ORAL
Status: DISCONTINUED | OUTPATIENT
Start: 2022-10-07 | End: 2022-10-10 | Stop reason: HOSPADM

## 2022-10-06 RX ORDER — ONDANSETRON 4 MG/1
4 TABLET, ORALLY DISINTEGRATING ORAL EVERY 6 HOURS PRN
Status: DISCONTINUED | OUTPATIENT
Start: 2022-10-06 | End: 2022-10-10 | Stop reason: HOSPADM

## 2022-10-06 RX ORDER — METOPROLOL SUCCINATE 50 MG/1
50 TABLET, EXTENDED RELEASE ORAL DAILY
Status: DISCONTINUED | OUTPATIENT
Start: 2022-10-07 | End: 2022-10-10 | Stop reason: HOSPADM

## 2022-10-06 RX ORDER — AMOXICILLIN 250 MG
1 CAPSULE ORAL 2 TIMES DAILY PRN
Status: DISCONTINUED | OUTPATIENT
Start: 2022-10-06 | End: 2022-10-10 | Stop reason: HOSPADM

## 2022-10-06 RX ORDER — GABAPENTIN 100 MG/1
100 CAPSULE ORAL AT BEDTIME
COMMUNITY

## 2022-10-06 RX ORDER — AMPICILLIN AND SULBACTAM 2; 1 G/1; G/1
3 INJECTION, POWDER, FOR SOLUTION INTRAMUSCULAR; INTRAVENOUS EVERY 6 HOURS
Status: DISCONTINUED | OUTPATIENT
Start: 2022-10-07 | End: 2022-10-07

## 2022-10-06 RX ORDER — PROCHLORPERAZINE 25 MG
12.5 SUPPOSITORY, RECTAL RECTAL EVERY 12 HOURS PRN
Status: DISCONTINUED | OUTPATIENT
Start: 2022-10-06 | End: 2022-10-10 | Stop reason: HOSPADM

## 2022-10-06 RX ORDER — LIDOCAINE 40 MG/G
CREAM TOPICAL
Status: DISCONTINUED | OUTPATIENT
Start: 2022-10-06 | End: 2022-10-10 | Stop reason: HOSPADM

## 2022-10-06 RX ORDER — GABAPENTIN 100 MG/1
100 CAPSULE ORAL AT BEDTIME
Status: DISCONTINUED | OUTPATIENT
Start: 2022-10-06 | End: 2022-10-10 | Stop reason: HOSPADM

## 2022-10-06 RX ORDER — ENOXAPARIN SODIUM 100 MG/ML
40 INJECTION SUBCUTANEOUS EVERY 24 HOURS
Status: DISCONTINUED | OUTPATIENT
Start: 2022-10-07 | End: 2022-10-10 | Stop reason: HOSPADM

## 2022-10-06 RX ORDER — AMOXICILLIN 250 MG
2 CAPSULE ORAL 2 TIMES DAILY PRN
Status: DISCONTINUED | OUTPATIENT
Start: 2022-10-06 | End: 2022-10-10 | Stop reason: HOSPADM

## 2022-10-06 RX ORDER — FAMOTIDINE 20 MG/1
20 TABLET, FILM COATED ORAL 2 TIMES DAILY
Status: DISCONTINUED | OUTPATIENT
Start: 2022-10-06 | End: 2022-10-10 | Stop reason: HOSPADM

## 2022-10-06 RX ORDER — LOSARTAN POTASSIUM 100 MG/1
100 TABLET ORAL
Status: DISCONTINUED | OUTPATIENT
Start: 2022-10-06 | End: 2022-10-10 | Stop reason: HOSPADM

## 2022-10-06 RX ADMIN — PIPERACILLIN AND TAZOBACTAM 3.38 G: 3; .375 INJECTION, POWDER, LYOPHILIZED, FOR SOLUTION INTRAVENOUS at 17:41

## 2022-10-06 RX ADMIN — LORAZEPAM 0.5 MG: 2 INJECTION INTRAMUSCULAR; INTRAVENOUS at 20:19

## 2022-10-06 RX ADMIN — GABAPENTIN 100 MG: 100 CAPSULE ORAL at 23:21

## 2022-10-06 RX ADMIN — LOSARTAN POTASSIUM 100 MG: 100 TABLET, FILM COATED ORAL at 23:15

## 2022-10-06 RX ADMIN — IBUPROFEN 400 MG: 200 TABLET, FILM COATED ORAL at 23:14

## 2022-10-06 RX ADMIN — ACETAMINOPHEN 1000 MG: 325 TABLET, FILM COATED ORAL at 23:28

## 2022-10-06 RX ADMIN — DULOXETINE HYDROCHLORIDE 60 MG: 60 CAPSULE, DELAYED RELEASE ORAL at 23:21

## 2022-10-06 RX ADMIN — HYDRALAZINE HYDROCHLORIDE 50 MG: 25 TABLET ORAL at 23:14

## 2022-10-06 RX ADMIN — FAMOTIDINE 20 MG: 20 TABLET ORAL at 23:21

## 2022-10-06 RX ADMIN — QUETIAPINE FUMARATE 25 MG: 25 TABLET ORAL at 23:20

## 2022-10-06 RX ADMIN — SODIUM CHLORIDE 500 ML: 9 INJECTION, SOLUTION INTRAVENOUS at 15:04

## 2022-10-06 ASSESSMENT — ACTIVITIES OF DAILY LIVING (ADL)
ADLS_ACUITY_SCORE: 35

## 2022-10-06 NOTE — LETTER
Health Information Management Services               Recipient:  [facility]          Sender: MASOOD HuddlestonW: [phone]          Date: October 10, 2022  Patient Name:  Ani Porter  Patient YOB: 1929  Routing Message:  Discharge orders for CELINA PAEZ          The documents accompanying this notice contain confidential information belonging to the sender.  This information is intended only for the use of the individual or entity named above.  The authorized recipient of this information is prohibited from disclosing this information to any other party and is required to destroy the information after its stated need has been fulfilled, unless otherwise required by state law.      If you are not the intended recipient, you are hereby notified that any disclosure, copy, distribution or action taken in reliance on the contents of these documents is strictly prohibited.  If you have received this document in error, please return it by fax to [phone] with a note on the cover sheet explaining why you are returning it (e.g. not your patient, not your provider, etc.).  If you need further assistance, please call [facility] Centralized Transcription at [phone].  Documents may also be returned by mail to [employer], , [facility] [address], [place], [place] [address].

## 2022-10-06 NOTE — ED PROVIDER NOTES
EMERGENCY DEPARTMENT ENCOUNTER      NAME: Ani Porter  AGE: 89 year old female  YOB: 1929  MRN: [id number]  EVALUATION DATE & TIME: 10/6/2022 12:38 PM    PCP: Archana Patel    ED PROVIDER: Sonido Rojas M.D.      Chief Complaint   Patient presents with     Altered Mental Status     Increased over last three days         FINAL IMPRESSION:  1. Altered mental status, unspecified altered mental status type          ED COURSE & MEDICAL DECISION MAKIN:51 PM I introduced myself to the patient, obtained patient history, performed a physical exam, and discussed plan for ED workup including potential diagnostic laboratory/imaging studies and interventions.  2:40 PM The patient was signed out to Dr Zhao Leyva at the end of my shift.     Pertinent Labs & Imaging studies reviewed. (See chart for details)  89 year old female presents to the Emergency Department for evaluation of confusion, hallucinations. Patient appears non toxic with stable vitals signs, patient is afebrile with no tachycardia or hypoxia, no increased work of breathing.  Lungs are clear, abdomen is benign.  Per patient's longtime nurse at the bedside she states have the patient presents with UTIs.  Denies any falls or trauma.  Patient denies any chest or pulmonary complaints, no ripping or tearing chest discomfort of the back or shoulders, hemoptysis or productive cough.  Concern for UTI, considered less likely dehydration Yue abnormality, considered but less likely COVID or pneumonia, less likely intracranial bleed or mass.  We will obtain screening labs, urine studies, COVID screen, chest x-ray and CT imaging of the head.  No neck or back pain to suggest cervical fracture or other vertebral body fracture or dislocation.    Reassessment: Initial labs showed no acute concerning findings and chest x-ray reported possible developing infiltrates.  Other labs, urine and CT imaging pending.  Final disposition be depend upon  the pending studies and the patient's clinical trajectory.  [facility]    At the conclusion of the encounter I discussed the results of all of the tests and the disposition. The questions were answered and return precautions provided. The patient or family acknowledged understanding and was agreeable with the care plan.         MEDICATIONS GIVEN IN THE EMERGENCY:  Medications   0.9% sodium chloride BOLUS (has no administration in time range)       NEW PRESCRIPTIONS STARTED AT TODAY'S ER VISIT  New Prescriptions    No medications on file            =================================================================    HPI    Patient information was obtained from: Caregiver, Patient    Use of Intrepreter: N/A      Ani Porter is a 89 year old female, history of HTN, CHF, TIA, UTI, fibromyalgia, who presents for evaluation of increased confusion and hallucinations.    Per chart review, patient had a urinalysis on 10/4. Urinalysis significant for cloudy urine with crystals and mucus, WBC 29, small amount of leukocyte esterase, nitrite negative, trace ketones, protein count of 200. Culture obtained. Notified by urology on 10/6 that UA is negative for infection, no need to start antibiotics.    Per the patient's caregiver Aliya, yesterday the patient was more confused than normal and had some word finding difficulty. Yesterday, the patient did not recognize Aliya, and today she did not know Aliya's name. The patient has some baseline dementia, but this is abnormal for her. Today the patient had a hallucination and saw a woman standing in the corner of her room. Aliya reports the patient typically has increased confusion and occasional hallucinations when she has a UTI. The patient has not had any recent falls or illnesses.    The patient denies chest pain, vomiting, and dysuria.      REVIEW OF SYSTEMS   Constitutional:  Denies fever, chills  Respiratory:  Denies productive cough  or increased work of breathing  Cardiovascular:  Denies chest pain, palpitations  GI:  Denies abdominal pain, nausea, vomiting, or change in bowel or bladder habits   Musculoskeletal:  Denies any new muscle/joint swelling  Skin:  Denies rash   Neurologic:  Denies focal weakness. Endorses word finding difficulty  Psychiatric: Endorses confusion, hallucinations  All systems negative except as marked.     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     Osteoarthritis      TIA (transient ischemic attack)      UTI (urinary tract infection) 9/19/2016     Vitamin B12 deficiency (non anemic) 3/11/2021    Overview:  getting monthly injections       PAST SURGICAL HISTORY:  Past Surgical History:   Procedure Laterality Date     APPENDECTOMY       ARTHROPLASTY KNEE BILATERAL       BACK SURGERY      CAN'T REMEMBER WHAT THE SURGERY WAS FOR     EYE SURGERY      CAN'T REMEMBER WHAT KIND OF EYE SURGERY     HYSTERECTOMY       OTHER SURGICAL HISTORY      BLADDER STIMULATOR         CURRENT MEDICATIONS:    Prior to Admission medications    Medication Sig Start Date End Date Taking? Authorizing Provider   acetaminophen (TYLENOL) 325 MG tablet Take 2 tablets (650 mg) by mouth every 4 hours as needed for mild pain 11/26/21   Cecy Castro, DO   acetaminophen (TYLENOL) 500 MG tablet Take 1,000 mg by mouth 2 times daily    Unknown, Entered By History   albuterol (PROAIR HFA/PROVENTIL HFA/VENTOLIN HFA) 108 (90 Base) MCG/ACT inhaler Inhale 1-2 puffs into the lungs every 4 hours as needed    Unknown, Entered By History   amLODIPine (NORVASC) 5 MG tablet Take 1 tablet (5 mg) by mouth daily 11/26/21 12/26/21  Cecy Castro, DO   aspirin  (ASA) 81 MG EC tablet Take 81 mg by mouth daily (with dinner)  5/17/21   Unknown, Entered By History   benzonatate (TESSALON) 200 MG capsule Take 200 mg by mouth 2 times daily  8/2/21   Unknown, Entered By History   cholecalciferol 25 MCG (1000 UT) TABS Take 2,000 Units by mouth daily (with lunch)     Unknown, Entered By History   DULoxetine (CYMBALTA) 60 MG capsule Take 60 mg by mouth At Bedtime 7/13/21   Unknown, Entered By History   famotidine (PEPCID) 20 MG tablet Take 20 mg by mouth 2 times daily With lunch and at bedtime. 4/14/21   Unknown, Entered By History   hydrALAZINE (APRESOLINE) 25 MG tablet Take 1 tablet (25 mg) by mouth 2 times daily With lunch and at bedtime. 11/26/21   Cecy Castro, DO   Lactobacillus (PROBIOTIC ACIDOPHILUS PO) Take 1 capsule by mouth daily (with dinner)     Unknown, Entered By History   levothyroxine (SYNTHROID/LEVOTHROID) 50 MCG tablet Take 50 mcg by mouth daily before breakfast  4/29/21   Unknown, Entered By History   loperamide (IMODIUM) 2 MG capsule Take 2 mg by mouth 4 times daily as needed 4/29/21   Unknown, Entered By History   losartan (COZAAR) 100 MG tablet Take 100 mg by mouth daily (with lunch)    Unknown, Entered By History   medical cannabis (Patient's own supply) Take 1 Dose by mouth See Admin Instructions (The purpose of this order is to document that the patient reports taking medical cannabis.  This is not a prescription, and is not used to certify that the patient has a qualifying medical condition.)    Tangerine capsule  Take 1 capsule in the morning and 1 hour prior to sleep with milk    Emulsion with supper    Unknown, Entered By History   melatonin 3 MG tablet Take 1 tablet (3 mg) by mouth At Bedtime 11/26/21   Cecy Castro, DO   Multiple Vitamins-Minerals (CENTRUM SILVER 50+WOMEN PO) Take 1 tablet by mouth daily (with dinner)     Unknown, Entered By History   Multiple Vitamins-Minerals (PRESERVISION AREDS) CAPS Take 1 capsule by mouth 2 times daily  (with meals) With lunch and supper.    Unknown, Entered By History   polyethylene glycol (MIRALAX) 17 GM/Dose powder Take 1 capful by mouth 2 times daily as needed for constipation    Unknown, Entered By History   polyethylene glycol-propylene glycol (SYSTANE) 0.4-0.3 % SOLN ophthalmic solution Place 2 drops into both eyes 3 times daily as needed 3/11/21   Unknown, Entered By History   vitamin B-12 (CYANOCOBALAMIN) 1000 MCG tablet Take 1,000 mcg by mouth daily (with lunch)    Unknown, Entered By History        ALLERGIES:  Allergies   Allergen Reactions     Other Allergy (See Comments) [External Allergen Needs Reconciliation - See Comment] Other (See Comments)     Patient Reports Reaction to FLU SHOT - Egg allergy!!, FLU SHOT - Egg allergy, PN: FLU SHOT - Egg allergy     Citalopram Analogues [Citalopram] Unknown and Other (See Comments)     Per pt and outside records     Demeclocycline Unknown     Doxycycline Unknown and Other (See Comments)     Per pt and outside records;     Erythromycin Unknown and Other (See Comments)     Per pt and outside records;      Erythromycin Ethylsuccinate [Erythromycin] Unknown     Hydromorphone Other (See Comments)     Depression, PN: Depression, Patient reports having depression     Iodine And Iodide Containing Products [Iodine] Other (See Comments) and Unknown     unknown, Per pt and outside records, pt's son confirms she is allergic to Iodine, PN: unknown     Sulfa (Sulfonamide Antibiotics) [Sulfa Drugs] Unknown, Other (See Comments) and Nausea and Vomiting     Confusion,, Other reaction(s): Confusion, Other (See Comments), confusion     Sulfasalazine Nausea and Vomiting     Terfenadine Unknown and Other (See Comments)     Other reaction(s): *Unknown, Per pt and outside records;     Tetracycline Other (See Comments) and Unknown     Per pt and outside records     Venlafaxine Analogues [Venlafaxine] Unknown     Dizziness and confusion     Cephalosporins Nausea and Vomiting, Itching,  Other (See Comments) and Rash     Itching     Prednisone Unknown, Anxiety and Other (See Comments)     Other reaction(s): Confusion, Hallucinations, Confusion, Confusion, hallucination,       FAMILY HISTORY:  No family history on file.    SOCIAL HISTORY:   Social History     Socioeconomic History     Marital status:    Tobacco Use     Smoking status: Never Smoker     Smokeless tobacco: Never Used   Substance and Sexual Activity     Alcohol use: Yes     Alcohol/week: 2.5 standard drinks     Comment: Alcoholic Drinks/day: very rarely     Drug use: No   Social History Narrative    Patient lives at alone in an independent senior facility.       VITALS:  Patient Vitals for the past 24 hrs:   BP Temp Temp src Pulse Resp SpO2   10/06/22 1315 -- -- -- 59 14 97 %   10/06/22 1247 (!) 187/88 98.8  F (37.1  C) Oral 60 22 99 %        PHYSICAL EXAM    Constitutional:  Awake, in no apparent distress  HENT:  Normocephalic, Atraumatic. Bilateral external ears normal. Oropharynx moist. Nose normal. Neck- Normal range of motion with no guarding, No midline cervical tenderness, Supple, No stridor.   Eyes:  PERRL, EOMI with no signs of entrapment, Conjunctiva normal, No discharge.   Respiratory:  Normal breath sounds, No respiratory distress, No wheezing.    Cardiovascular:  Normal heart rate, Normal rhythm, No appreciable rubs or gallops.   GI:  Soft, No tenderness, No distension, No palpable masses  Musculoskeletal:  Intact distal pulses, No pitting edema.  No gross deformities  Integument:  Warm, Dry, No erythema, No rash.   Neurologic:  Mild confusion, Normal motor function, Normal sensory function, No focal deficits noted.   Psychiatric:  Affect normal, Judgment normal, Mood normal.     LAB:  All pertinent labs reviewed and interpreted.       RADIOLOGY:  XR Chest 2 Views    (Results Pending)   CT Head w/o Contrast    (Results Pending)          EKG:      I have independently reviewed and interpreted the EKG(s) documented  above.    PROCEDURES:         I, Nolberto Sinha, am serving as a scribe to document services personally performed by Sonido Rojas MD, based on my observation and the provider's statements to me. I, Sonido Rojas MD attest that Nolberto Sinha is acting in a scribe capacity, has observed my performance of the services and has documented them in accordance with my direction.    Sonido Rojas M.D.  Emergency Medicine  [facility] EMERGENCY DEPARTMENT  [address] [place] [id number]  [phone]  Dept: [phone]       Sonido Rojas MD  10/06/22 0705

## 2022-10-06 NOTE — PROGRESS NOTES
[facility]  Transfer Triage Note    Date of call: 10/06/22  Time of call: 5:34 PM    Current Patient Location: [facility]  Current Level of Care: ED    Vitals: Temp: 98.8  F (37.1  C) Temp src: Oral BP: (!) 187/88 Pulse: 56   Resp: 22 SpO2: 98 %        at    Diagnosis: Community Acquired Pneumonia, Altered Mental Status  Is COVID-19 a concern? No  Reason for requested transfer: No beds available at [facility]   Isolation Needs: None    Outside Records: Available  Additional records may be faxed to [phone].    Transfer accepted: Yes  Stability of Patient: Patient is vitally stable, with no critical labs, and will likely remain stable throughout the transfer process  Level of Care Needed: Med Surg  Telemetry Needed:  None  Expected Time of Arrival for Transfer: 0-8 hours  Arrival Location:  [facility]    Recommendations for Management and Stabilization: Not needed        Anai Mcmahon MD

## 2022-10-06 NOTE — LETTER
Health Information Management Services               Recipient: [facility]          Sender:  MASOOD HuddlestonW:  [phone]          Date: October 10, 2022  Patient Name:  Ani Porter  Patient YOB: 1929  Routing Message:  UPDATED DISCHARGE ORDERS FOR CELINA PAEZ          The documents accompanying this notice contain confidential information belonging to the sender.  This information is intended only for the use of the individual or entity named above.  The authorized recipient of this information is prohibited from disclosing this information to any other party and is required to destroy the information after its stated need has been fulfilled, unless otherwise required by state law.      If you are not the intended recipient, you are hereby notified that any disclosure, copy, distribution or action taken in reliance on the contents of these documents is strictly prohibited.  If you have received this document in error, please return it by fax to [phone] with a note on the cover sheet explaining why you are returning it (e.g. not your patient, not your provider, etc.).  If you need further assistance, please call [facility] Centralized Transcription at [phone].  Documents may also be returned by mail to [employer], , [facility] [address], [place], [place] [address].

## 2022-10-06 NOTE — LETTER
Health Information Management Services               Recipient: [facility]          Sender: MASOOD HuddlestonW:  [phone]          Date: October 10, 2022  Patient Name:  Ani Porter  Patient YOB: 1929  Routing Message:  Discharge orders for CELINA PAEZ          The documents accompanying this notice contain confidential information belonging to the sender.  This information is intended only for the use of the individual or entity named above.  The authorized recipient of this information is prohibited from disclosing this information to any other party and is required to destroy the information after its stated need has been fulfilled, unless otherwise required by state law.      If you are not the intended recipient, you are hereby notified that any disclosure, copy, distribution or action taken in reliance on the contents of these documents is strictly prohibited.  If you have received this document in error, please return it by fax to [phone] with a note on the cover sheet explaining why you are returning it (e.g. not your patient, not your provider, etc.).  If you need further assistance, please call [facility] Centralized Transcription at [phone].  Documents may also be returned by mail to [employer], , [facility] [address], [place], [place] [address].

## 2022-10-06 NOTE — LETTER
Health Information Management Services               Recipient: [facility]          Sender:  MASOOD HuddlestonW: [phone]          Date: October 10, 2022  Patient Name:  Ani Porter  Patient YOB: 1929  Routing Message:  Discharge orders for CELINA PAEZ          The documents accompanying this notice contain confidential information belonging to the sender.  This information is intended only for the use of the individual or entity named above.  The authorized recipient of this information is prohibited from disclosing this information to any other party and is required to destroy the information after its stated need has been fulfilled, unless otherwise required by state law.      If you are not the intended recipient, you are hereby notified that any disclosure, copy, distribution or action taken in reliance on the contents of these documents is strictly prohibited.  If you have received this document in error, please return it by fax to [phone] with a note on the cover sheet explaining why you are returning it (e.g. not your patient, not your provider, etc.).  If you need further assistance, please call [facility] Centralized Transcription at [phone].  Documents may also be returned by mail to [employer], , [facility] [address], [place], [place] [address].

## 2022-10-06 NOTE — ED TRIAGE NOTES
Pt arrived via triage. Reported pt had increased confusion this am. Was screened for uti and negative. Concerned that she has had word finding difficulty last week.     Triage Assessment     Row Name 10/06/22 1248       Triage Assessment (Adult)    Airway WDL WDL       Respiratory WDL    Respiratory WDL WDL       Skin Circulation/Temperature WDL    Skin Circulation/Temperature WDL WDL       Cardiac WDL    Cardiac WDL WDL       Peripheral/Neurovascular WDL    Peripheral Neurovascular WDL WDL       Cognitive/Neuro/Behavioral WDL    Cognitive/Neuro/Behavioral WDL WDL       [place] Coma Scale    Best Eye Response 4-->(E4) spontaneous    Best Motor Response 6-->(M6) obeys commands    Best Verbal Response 5-->(V5) oriented    [place] Coma Scale Score 15

## 2022-10-06 NOTE — ED NOTES
EMERGENCY DEPARTMENT SIGN OUT NOTE        ED COURSE AND MEDICAL DECISION MAKING  Patient was signed out to me by Dr Sonido Rojas at 2:05 PM.    In brief, Ani Porter is a 89 year old female who initially presented altered mental status. Patient's caretaker reports increased confusion and hallucinations which is abnormal to baseline.    At time of sign out, disposition was pending labs, urine studies, and CT imaging.  Imaging was concerning for possible pneumonia, is no evidence of UTI, and labs are otherwise largely unremarkable on this patient.  However after discussion with the patient's caretaker, but she lives at an assisted living facility, though being a caretaker to help her for the next 36 to 48 hours, and with her current confused/altered state, I do not believe we can safely discharge her home into this environment.  Therefore plan will be for admission for IV antibiotics for the suspected early pneumonia, and further monitoring.  The patient was agreeable to transfer and there is space at [facility] therefore we will transfer there for further management.    4:18 PM Rechecked and updated patient. Patient has slightly increased mental status. She lives in assisted living and currently will not have care taker for about 32-46 hours if discharged home. Due to safety concern, she will be admitted.    5:30 PM Spoke with hospitalist Dr. Mcmahon.        FINAL IMPRESSION    1. Altered mental status, unspecified altered mental status type        ED MEDS  Medications   0.9% sodium chloride BOLUS (500 mLs Intravenous New Bag 10/6/22 1504)       LAB  Labs Ordered and Resulted from Time of ED Arrival to Time of ED Departure   COMPREHENSIVE METABOLIC PANEL - Abnormal       Result Value    Sodium 136      Potassium 4.1      Chloride 95 (*)     Carbon Dioxide (CO2) 27      Anion Gap 14      Urea Nitrogen 19.6      Creatinine 0.77      Calcium 9.4      Glucose 165 (*)     Alkaline Phosphatase 53      AST  27      ALT 15      Protein Total 6.5      Albumin 4.1      Bilirubin Total 0.7      GFR Estimate 72     AMMONIA - Normal    Ammonia 14     COVID-19 VIRUS (CORONAVIRUS) BY PCR - Normal    SARS CoV2 PCR Negative     CBC WITH PLATELETS AND DIFFERENTIAL    WBC Count 4.9      RBC Count 4.06      Hemoglobin 12.8      Hematocrit 38.6      MCV 95      MCH 31.5      MCHC 33.2      RDW 13.2      Platelet Count 257      % Neutrophils 62      % Lymphocytes 28      % Monocytes 8      % Eosinophils 2      % Basophils 0      % Immature Granulocytes 0      NRBCs per 100 WBC 0      Absolute Neutrophils 3.0      Absolute Lymphocytes 1.4      Absolute Monocytes 0.4      Absolute Eosinophils 0.1      Absolute Basophils 0.0      Absolute Immature Granulocytes 0.0      Absolute NRBCs 0.0     ROUTINE UA WITH MICROSCOPIC REFLEX TO CULTURE           RADIOLOGY    CT Head w/o Contrast   Final Result   IMPRESSION:   1.  No CT evidence for acute intracranial process.   2.  Brain atrophy and presumed chronic microvascular ischemic changes as above.   3.  No interval change.      XR Chest 2 Views   Final Result   IMPRESSION: Mild right basilar pulmonary opacities may reflect atelectasis or mild developing infiltrates. The left lung is clear. No pleural effusion. Normal heart size. No overt vascular congestion.          DISCHARGE MEDS  New Prescriptions    No medications on file       Dr. Zhao Leyva, D.O.  [facility] EMERGENCY DEPARTMENT  [facility] [facility] [address] [phone]     Zhao Leyva,   10/06/22 1916

## 2022-10-06 NOTE — PHARMACY-ADMISSION MEDICATION HISTORY
Pharmacy Note - Admission Medication History    Pertinent Provider Information: Group Home patient, I was able to talk to care coordinator to verify medication list. Patient uses cannabis capsules, but is planning on stopping due to a lack of efficacy. Recently started gabapentin is much more efficacious.     ______________________________________________________________________    Prior To Admission (PTA) med list completed and updated in EMR.       PTA Med List   Medication Sig Note Last Dose     acetaminophen (TYLENOL) 500 MG tablet Take 1,000 mg by mouth 2 times daily  10/6/2022 at 1000     albuterol (PROAIR HFA/PROVENTIL HFA/VENTOLIN HFA) 108 (90 Base) MCG/ACT inhaler Inhale 1-2 puffs into the lungs every 4 hours as needed  Past Month at Unknown time     aspirin (ASA) 81 MG EC tablet Take 81 mg by mouth daily (with dinner)   10/5/2022 at 1300     benzonatate (TESSALON) 100 MG capsule Take 100 mg by mouth At Bedtime  10/5/2022 at 1900     calcium carbonate (OS-CHAMP) 1500 (600 Ca) MG tablet Take 600 mg by mouth daily  10/6/2022 at 0800     cetirizine (ZYRTEC) 10 MG tablet Take 10 mg by mouth daily as needed for allergies  10/5/2022 at 0800     cholecalciferol 25 MCG (1000 UT) TABS Take 2,000 Units by mouth daily (with dinner)  10/5/2022 at 1700     DULoxetine (CYMBALTA) 60 MG capsule Take 60 mg by mouth At Bedtime  10/5/2022 at 1900     famotidine (PEPCID) 20 MG tablet Take 20 mg by mouth 2 times daily With lunch and at bedtime.  10/5/2022 at 1900     fluticasone (FLONASE) 50 MCG/ACT nasal spray Spray 1 spray into both nostrils daily  10/6/2022 at 0800     gabapentin (NEURONTIN) 100 MG capsule Take 100 mg by mouth At Bedtime  10/4/2022 at 1900     hydrALAZINE (APRESOLINE) 25 MG tablet Take 1 tablet (25 mg) by mouth 2 times daily With lunch and at bedtime. (Patient taking differently: Take 50 mg by mouth 2 times daily With lunch and at bedtime.)  10/5/2022 at 1900     ibuprofen (ADVIL/MOTRIN) 200 MG tablet Take 200  mg by mouth Take 600 mg every morning and 400 mg every evening  10/6/2022 at 1000     Lactobacillus (PROBIOTIC ACIDOPHILUS PO) Take 1 capsule by mouth daily (with dinner)   10/5/2022 at 1700     levothyroxine (SYNTHROID/LEVOTHROID) 50 MCG tablet Take 50 mcg by mouth daily before breakfast   10/6/2022 at 1000     loperamide (IMODIUM) 2 MG capsule Take 2 mg by mouth 4 times daily as needed  Past Month at Unknown time     losartan (COZAAR) 100 MG tablet Take 100 mg by mouth daily (with lunch)  10/5/2022 at 1300     medical cannabis (Patient's own supply) Take 1 Dose by mouth See Admin Instructions (The purpose of this order is to document that the patient reports taking medical cannabis.  This is not a prescription, and is not used to certify that the patient has a qualifying medical condition.)    Tangerine capsule  Take 1 capsule in the morning and 1 hour prior to sleep with milk    Emulsion with supper 10/6/2022: Patient used 0.5 mL of suspension today, planning on stopping due to lack of efficacy. 10/6/2022 at Unknown time     melatonin 5 MG tablet Take 5 mg by mouth At Bedtime  10/5/2022 at 1900     metoprolol succinate ER (TOPROL XL) 50 MG 24 hr tablet Take 50 mg by mouth daily   at 0800     Multiple Vitamins-Minerals (CENTRUM SILVER 50+WOMEN PO) Take 1 tablet by mouth daily (with dinner)   10/5/2022 at 1700     Multiple Vitamins-Minerals (PRESERVISION AREDS) CAPS Take 1 capsule by mouth 2 times daily (with meals) With lunch and supper.  10/5/2022 at 1700     polyethylene glycol-propylene glycol (SYSTANE) 0.4-0.3 % SOLN ophthalmic solution Place 2 drops into both eyes 3 times daily as needed  10/6/2022 at 1000     QUEtiapine (SEROQUEL) 25 MG tablet Take 25 mg by mouth At Bedtime  10/5/2022 at 1900     vitamin B-12 (CYANOCOBALAMIN) 1000 MCG tablet Take 1,000 mcg by mouth daily (with lunch)  10/5/2022 at 1300       Information source(s): Caregiver, CareEverywhere/SureScripts and  medication list  Method of  interview communication: in-person    Summary of Changes to PTA Med List  New: Ibuprofen, Gabapentin, Quetiapine, Calcium, Fluticasone, and Cetirizine  Discontinued: Miralax  Changed: Metoprolol succinate changed from 25 mg BID to 50 mg BID    Patient was asked about OTC/herbal products specifically.  PTA med list reflects this.    In the past week, patient estimated taking medication this percent of the time:  greater than 90%.    Allergies were reviewed, assessed, and updated with the patient.      Patient does not anticipate needing any multi-use medications during admission.    The information provided in this note is only as accurate as the sources available at the time of the update(s).    Thank you for the opportunity to participate in the care of this patient.    MOHAN BOONE RPH  10/6/2022 6:00 PM

## 2022-10-06 NOTE — LETTER
Transition Communication Hand-off for Care Transitions to Next Level of Care Provider    Name: Ani Porter  : 1929  MRN #: [id number]  Primary Care Provider: Archana Patel     Primary Clinic: [facility] [address]  [facility] [address]     Reason for Hospitalization:  Acute encephalopathy [G93.40]  Admit Date/Time: 10/6/2022  9:20 PM  Discharge Date: 10/10/22  Payor Source: Payor: MEDICARE / Plan: MEDICARE / Product Type: Medicare /            Reason for Communication Hand-off Referral: Other Continuity of Care    Discharge Plan:  Pt will return to [facility], resume home RN/PT/OT services.    [facility]  [address] [address] [address]   PH: [phone]  Nurses Station: [phone]  Fax: [phone]    [facility]   PH: [phone])   Fax- [phone]     Concern for non-adherence with plan of care:   Y/N No  Discharge Needs Assessment:  Needs    Flowsheet Row Most Recent Value   Equipment Currently Used at Home walker, standard                        Key Recommendations:      SHEILA LEVY    AVS/Discharge Summary is the source of truth; this is a helpful guide for improved communication of patient story

## 2022-10-06 NOTE — ED NOTES
Bed: JNED-20  Expected date: 10/6/22  Expected time:   Means of arrival: Ambulance  Comments:  Eugene leroy

## 2022-10-07 LAB
ANION GAP SERPL CALCULATED.3IONS-SCNC: 6 MMOL/L (ref 3–14)
BUN SERPL-MCNC: 17 MG/DL (ref 7–30)
CALCIUM SERPL-MCNC: 9 MG/DL (ref 8.5–10.1)
CHLORIDE BLD-SCNC: 105 MMOL/L (ref 94–109)
CO2 SERPL-SCNC: 28 MMOL/L (ref 20–32)
CREAT SERPL-MCNC: 0.67 MG/DL (ref 0.52–1.04)
CRP SERPL-MCNC: <2.9 MG/L (ref 0–8)
ERYTHROCYTE [DISTWIDTH] IN BLOOD BY AUTOMATED COUNT: 13.2 % (ref 10–15)
GFR SERPL CREATININE-BSD FRML MDRD: 81 ML/MIN/1.73M2
GLUCOSE BLD-MCNC: 109 MG/DL (ref 70–99)
HCT VFR BLD AUTO: 35.2 % (ref 35–47)
HGB BLD-MCNC: 11.9 G/DL (ref 11.7–15.7)
MCH RBC QN AUTO: 31.2 PG (ref 26.5–33)
MCHC RBC AUTO-ENTMCNC: 33.8 G/DL (ref 31.5–36.5)
MCV RBC AUTO: 92 FL (ref 78–100)
PLATELET # BLD AUTO: 213 10E3/UL (ref 150–450)
POTASSIUM BLD-SCNC: 3.3 MMOL/L (ref 3.4–5.3)
PROCALCITONIN SERPL-MCNC: <0.05 NG/ML
RBC # BLD AUTO: 3.82 10E6/UL (ref 3.8–5.2)
SODIUM SERPL-SCNC: 139 MMOL/L (ref 133–144)
WBC # BLD AUTO: 5.2 10E3/UL (ref 4–11)

## 2022-10-07 PROCEDURE — 87086 URINE CULTURE/COLONY COUNT: CPT | Performed by: INTERNAL MEDICINE

## 2022-10-07 PROCEDURE — 250N000011 HC RX IP 250 OP 636: Performed by: NURSE PRACTITIONER

## 2022-10-07 PROCEDURE — 120N000002 HC R&B MED SURG/OB UMMC

## 2022-10-07 PROCEDURE — 82310 ASSAY OF CALCIUM: CPT | Performed by: NURSE PRACTITIONER

## 2022-10-07 PROCEDURE — 85027 COMPLETE CBC AUTOMATED: CPT | Performed by: NURSE PRACTITIONER

## 2022-10-07 PROCEDURE — 250N000013 HC RX MED GY IP 250 OP 250 PS 637: Performed by: NURSE PRACTITIONER

## 2022-10-07 PROCEDURE — 99232 SBSQ HOSP IP/OBS MODERATE 35: CPT | Performed by: INTERNAL MEDICINE

## 2022-10-07 PROCEDURE — 36415 COLL VENOUS BLD VENIPUNCTURE: CPT | Performed by: NURSE PRACTITIONER

## 2022-10-07 PROCEDURE — 86140 C-REACTIVE PROTEIN: CPT | Performed by: INTERNAL MEDICINE

## 2022-10-07 PROCEDURE — 84145 PROCALCITONIN (PCT): CPT | Performed by: NURSE PRACTITIONER

## 2022-10-07 PROCEDURE — 250N000013 HC RX MED GY IP 250 OP 250 PS 637: Performed by: INTERNAL MEDICINE

## 2022-10-07 RX ORDER — POTASSIUM CHLORIDE 1.5 G/1.58G
40 POWDER, FOR SOLUTION ORAL ONCE
Status: COMPLETED | OUTPATIENT
Start: 2022-10-07 | End: 2022-10-07

## 2022-10-07 RX ORDER — VITAMIN B COMPLEX
2000 TABLET ORAL
Status: DISCONTINUED | OUTPATIENT
Start: 2022-10-07 | End: 2022-10-10 | Stop reason: HOSPADM

## 2022-10-07 RX ORDER — LANOLIN ALCOHOL/MO/W.PET/CERES
1000 CREAM (GRAM) TOPICAL
Status: DISCONTINUED | OUTPATIENT
Start: 2022-10-07 | End: 2022-10-10 | Stop reason: HOSPADM

## 2022-10-07 RX ORDER — VIT C/E/ZN/COPPR/LUTEIN/ZEAXAN 60 MG-6 MG
1 CAPSULE ORAL 2 TIMES DAILY WITH MEALS
Status: DISCONTINUED | OUTPATIENT
Start: 2022-10-07 | End: 2022-10-10 | Stop reason: HOSPADM

## 2022-10-07 RX ADMIN — GABAPENTIN 100 MG: 100 CAPSULE ORAL at 21:16

## 2022-10-07 RX ADMIN — ENOXAPARIN SODIUM 40 MG: 40 INJECTION SUBCUTANEOUS at 09:55

## 2022-10-07 RX ADMIN — HYDRALAZINE HYDROCHLORIDE 50 MG: 25 TABLET ORAL at 21:16

## 2022-10-07 RX ADMIN — AMPICILLIN SODIUM AND SULBACTAM SODIUM 3 G: 2; 1 INJECTION, POWDER, FOR SOLUTION INTRAMUSCULAR; INTRAVENOUS at 02:05

## 2022-10-07 RX ADMIN — IBUPROFEN 400 MG: 200 TABLET, FILM COATED ORAL at 21:16

## 2022-10-07 RX ADMIN — CYANOCOBALAMIN TAB 1000 MCG 1000 MCG: 1000 TAB at 11:05

## 2022-10-07 RX ADMIN — ACETAMINOPHEN 1000 MG: 325 TABLET, FILM COATED ORAL at 09:55

## 2022-10-07 RX ADMIN — LEVOTHYROXINE SODIUM 50 MCG: 50 TABLET ORAL at 09:54

## 2022-10-07 RX ADMIN — FAMOTIDINE 20 MG: 20 TABLET ORAL at 11:05

## 2022-10-07 RX ADMIN — ASPIRIN 81 MG: 81 TABLET, COATED ORAL at 17:29

## 2022-10-07 RX ADMIN — FAMOTIDINE 20 MG: 20 TABLET ORAL at 21:16

## 2022-10-07 RX ADMIN — IBUPROFEN 600 MG: 200 TABLET, FILM COATED ORAL at 09:54

## 2022-10-07 RX ADMIN — AMPICILLIN SODIUM AND SULBACTAM SODIUM 3 G: 2; 1 INJECTION, POWDER, FOR SOLUTION INTRAMUSCULAR; INTRAVENOUS at 09:18

## 2022-10-07 RX ADMIN — QUETIAPINE FUMARATE 25 MG: 25 TABLET ORAL at 21:16

## 2022-10-07 RX ADMIN — Medication 1 CAPSULE: at 11:05

## 2022-10-07 RX ADMIN — Medication 2000 UNITS: at 17:29

## 2022-10-07 RX ADMIN — POTASSIUM CHLORIDE 40 MEQ: 1.5 FOR SOLUTION ORAL at 12:26

## 2022-10-07 RX ADMIN — ACETAMINOPHEN 1000 MG: 325 TABLET, FILM COATED ORAL at 21:16

## 2022-10-07 RX ADMIN — METOPROLOL SUCCINATE 50 MG: 50 TABLET, EXTENDED RELEASE ORAL at 09:54

## 2022-10-07 RX ADMIN — DULOXETINE HYDROCHLORIDE 60 MG: 60 CAPSULE, DELAYED RELEASE ORAL at 21:16

## 2022-10-07 RX ADMIN — LOSARTAN POTASSIUM 100 MG: 100 TABLET, FILM COATED ORAL at 11:05

## 2022-10-07 RX ADMIN — Medication 1 CAPSULE: at 17:29

## 2022-10-07 RX ADMIN — HYDRALAZINE HYDROCHLORIDE 50 MG: 25 TABLET ORAL at 11:05

## 2022-10-07 ASSESSMENT — ACTIVITIES OF DAILY LIVING (ADL)
ADLS_ACUITY_SCORE: 35
ADLS_ACUITY_SCORE: 35
ADLS_ACUITY_SCORE: 47
ADLS_ACUITY_SCORE: 35
ADLS_ACUITY_SCORE: 45
ADLS_ACUITY_SCORE: 35
ADLS_ACUITY_SCORE: 45
ADLS_ACUITY_SCORE: 47
ADLS_ACUITY_SCORE: 35
ADLS_ACUITY_SCORE: 47

## 2022-10-07 NOTE — PLAN OF CARE
VS: BP (!) 193/78 (BP Location: Left arm)   Pulse 66   Temp (!) 96.4  F (35.8  C) (Oral)   Resp 16   Wt 70.1 kg (154 lb 8.7 oz)   SpO2 98%   BMI 28.27 kg/m      O2: O2 SATS >90% denies chest pain,  or SBO   Output: Voids adequately in the bathroom   Last BM: BS present all x4 quadrants    Activity: Assist 1 with walker and gait belt   Up for meals? N/a   Skin: Dry and intact   Pain: Denies pain this shift   CMS: Confused , disoriented to time place and situation   Dressing: none   Diet: Regular diet   LDA: R PIV Saline locked   Equipment: IV Pole Gait Belt,personal belongings    Plan: Continue to monitor  and update, bed alarm  in place for safety    Additional Info: Running high BP hydrazine administered x1 , bp this morning 163/88,

## 2022-10-07 NOTE — PROGRESS NOTES
"[facility]    Medicine Progress Note - Hospitalist Service, GOLD TEAM 16    Date of Admission:  10/6/2022    Assessment & Plan          Ani Porter is a 89 year old woman admitted on 10/6/2022. She has a history of dementia, hypothyroidism, hypertension, CVA, PRABHJOT, diastolic heart failure and was transferred here with acute encephalopathy (worsened delirium) and ?possible early pneumonia vs UTI.      1) Dementia, acute encephalopathy - On adm: reportedly more confused today than usual.  On assessment she is entirely disoriented.  MD spoke with staff at her assisted living facility and they told that its \"hit and miss\" with orientation so it appears she does have significant dementia. Noted no new neurological deficit on exam.  Outside UA borderline for infection with slightly elevated leuk esterase, outside CXR with some mild R basilar opacities of unclear significance.  CT head with no acute changes.  10/7/2022: more alert and interactive, oriented to self. Pleasant.   No clinical e.o sepsis or infection. procal neg. Afebrile. VSS.     -check UC  - given no clear e.o infection and pt appears almost at baseline, will monitor off antibiotics for now. Discontinue iv unasyn 10/7/2022  - PT, OT consultation   - , CC consultation for dispo planning  - Fall precautions. Bed alarm. 1:1 prn  - Delirium precautions. Watch for sundowning. Ensure good sleep hygiene. Hydration. etc       2) History of hypertension - Modestly elevated at present likely secondary to missing earlier doses.  - Continue home hydralazine, losartan, metoprolol. PRN hydralazine for sbp>160     3) History of COPD   - Continue home albuterol     4) History of GERD  - Continue home famotidine     5) History of PRABHJOT  - CPAP overnight    6) Hypokalemia: K 3.3  - Kcl 40 meq po x 1.         Diet: Combination Diet Regular Diet Adult    DVT Prophylaxis: Enoxaparin (Lovenox) SQ  Pantoja Catheter: Not " present  Central Lines: None  Cardiac Monitoring: None  Code Status: No CPR- Do NOT Intubate      Disposition Plan      Expected Discharge Date: 10/08/2022                The patient's care was discussed with the Bedside Nurse, Care Coordinator/ and Patient.  * patient did not want me call any family contact !    Darron Cameron MD  Hospitalist Service, GOLD TEAM 16  [employer]  Securely message with the Vocera Web Console (learn more here)  Text page via [facility] Paging/Directory   Please see signed in provider for up to date coverage information      Clinically Significant Risk Factors Present on Admission                 # Hypertension: home medication list includes antihypertensive(s)          ______________________________________________________________________    Interval History   Interval events reviewed.   States doing okay.   Alert to self as above  Denies any pain  Denies fever or chills.   No cough or cp or sob.   No NV or pain abdomen.   No dysuria or bowel complaint.   No new sensory or motor complaint.   No new rash.    No other new or acute medical concern      Data reviewed today: I reviewed all medications, new labs and imaging results over the last 24 hours. I personally reviewed no images or EKG's today.    Physical Exam   Vital Signs: Temp: 96.9  F (36.1  C) Temp src: Oral BP: (!) 162/78 Pulse: 71   Resp: 18 SpO2: 96 % O2 Device: None (Room air)    Weight: 154 lbs 8.68 oz    General Appearance: Awake, interactive, NAD, laying on bed.   Respiratory: Normal work of breathing. Clear bl  Cardiovascular: RRR s1s2  GI: Soft. NT. ND.  Extremities: Distally wwp.   Skin: No acute rash on exposed areas.   Neuro: alert. Moving all extremities.   Others: normal affect.        Data   Recent Labs   Lab 10/07/22  0550 10/06/22  1351   WBC 5.2 4.9   HGB 11.9 12.8   MCV 92 95    257    136   POTASSIUM 3.3* 4.1   CHLORIDE 105 95*   CO2 28 27    BUN 17 19.6   CR 0.67 0.77   ANIONGAP 6 14   CHAMP 9.0 9.4   * 165*   ALBUMIN  --  4.1   PROTTOTAL  --  6.5   BILITOTAL  --  0.7   ALKPHOS  --  53   ALT  --  15   AST  --  27     Recent Results (from the past 24 hour(s))   XR Chest 2 Views    Narrative    EXAM: XR CHEST 2 VIEWS  LOCATION: [facility]  DATE/TIME: 10/6/2022 2:13 PM    INDICATION: Altered mental status  COMPARISON: Chest x-ray 11/22/2021      Impression    IMPRESSION: Mild right basilar pulmonary opacities may reflect atelectasis or mild developing infiltrates. The left lung is clear. No pleural effusion. Normal heart size. No overt vascular congestion.   CT Head w/o Contrast    Narrative    EXAM: CT HEAD W/O CONTRAST  LOCATION: [facility]  DATE/TIME: 10/6/2022 2:51 PM    INDICATION: Altered mental status.  COMPARISON: Head CT 07/16/2022  TECHNIQUE: Routine CT Head without IV contrast. Multiplanar reformats. Dose reduction techniques were used.    FINDINGS:  INTRACRANIAL CONTENTS: No intracranial hemorrhage, extraaxial collection, or mass effect.  No CT evidence of acute infarct. Moderate presumed chronic small vessel ischemic changes. Moderate generalized volume loss. No hydrocephalus.     VISUALIZED ORBITS/SINUSES/MASTOIDS: No intraorbital abnormality. No paranasal sinus mucosal disease. No middle ear or mastoid effusion.    BONES/SOFT TISSUES: No acute abnormality.      Impression    IMPRESSION:  1.  No CT evidence for acute intracranial process.  2.  Brain atrophy and presumed chronic microvascular ischemic changes as above.  3.  No interval change.     Medications       acetaminophen  1,000 mg Oral BID     aspirin  81 mg Oral Daily with supper     cyanocobalamin  1,000 mcg Oral Daily with lunch     DULoxetine  60 mg Oral At Bedtime     enoxaparin ANTICOAGULANT  40 mg Subcutaneous Q24H     famotidine  20 mg Oral BID     gabapentin  100 mg Oral At Bedtime     hydrALAZINE  50 mg Oral BID      ibuprofen  400 mg Oral QPM     ibuprofen  600 mg Oral QAM     levothyroxine  50 mcg Oral QAM AC     losartan  100 mg Oral Daily with lunch     metoprolol succinate ER  50 mg Oral Daily     multivitamin  with lutein  1 capsule Oral BID w/meals     potassium chloride  40 mEq Oral Once     QUEtiapine  25 mg Oral At Bedtime     sodium chloride (PF)  3 mL Intracatheter Q8H     Vitamin D3  2,000 Units Oral Daily with supper

## 2022-10-07 NOTE — PLAN OF CARE
VS: BP (!) 163/88 (BP Location: Left arm)   Pulse 115   Temp (!) 95.5  F (35.3  C) (Oral)   Resp 16   Wt 70.1 kg (154 lb 8.7 oz)   SpO2 95%   BMI 28.27 kg/m      O2: O2 SATS >90% denies chest pain,  or SBO   Output: Voids adequately in the bathroom   Last BM: Date BS present all x4 quadrants    Activity:  Assist 1 with walker and gait belt   Up for meals? N/a   Skin: Dry and intact   Pain: Denies pain   CMS: Disoriented to time. Place and situation   Dressing: none   Diet: Regular diet   LDA: R PIV SL   Equipment: IV Pole Gait Belt, walker    Plan: Continue to monitor  and update   Additional Info:

## 2022-10-07 NOTE — PHARMACY-ADMISSION MEDICATION HISTORY
Admission medication history completed at [facility]. Please see Pharmacy - Admission Medication History note from 10/06/2022.

## 2022-10-07 NOTE — H&P
"[facility]    History and Physical - Hospitalist Service, GOLD TEAM        Date of Admission:  10/6/2022    Assessment & Plan      Ani Porter is a 89 year old woman admitted on 10/6/2022. She has a history of dementia, hypothyroidism, hypertension, CVA, PRABHJOT, diastolic heart failure and was transferred here with reported worsened delirium and possible early pneumonia.    1) Dementia, acute encephalopathy - Reportedly more confused today than usual.  On assessment she is entirely disoriented.  I spoke with staff at her assisted living facility and they tell me she is \"hit and miss\" with orientation so it appears she does have significant dementia.  I note no neurological deficits on exam.  Outside UA borderline for infection with slightly elevated leuk esterase, outside CXR with some mild R basilar opacities of unclear significance.  CT head with no acute changes.  - Falls risk.  Notify medicine if any concerns and we will order a sitter overnight.  - Started on Zosyn in the outside ED.  Will switch to unasyn and order a procalcitonin with am labs.  If negative, can likely stop antibiotics.  - Care staff at her facility were limited and it appears one of the reasons she was transferred here is that outside ED staff felt it was unsafe to send her home in her potentially altered state.  - Consult care coordinator    2) History of hypertension - Modestly elevated at present likely secondary to missing earlier doses.  - Continue home hydralazine, losartan, metoprolol.  Will give now and add on PRN hydralazine if needed.    3) History of COPD   - Continue home albuterol    4) History of GERD  - Continue home famotidine    5) History of PRABHJOT  - CPAP overnight       Diet:   Regular  DVT Prophylaxis: Enoxaparin (Lovenox) SQ  Pantoja Catheter: Not present  Central Lines: None  Cardiac Monitoring: None  Code Status:   DNR (POLST)    Clinically Significant Risk Factors Present on " Admission                 # Hypertension: home medication list includes antihypertensive(s)          Disposition Plan      Expected Discharge Date: 10/11/2022              The patient's care was discussed with the Attending Physician, Dr. Corbett.    KIMI Guadarrama [facility]  Hospitalist Service, [facility]  Securely message with the Vocera Web Console (learn more here)  Text page via [facility] Paging/Directory   Please see signed in provider for up to date coverage information      ______________________________________________________________________    Chief Complaint   Confusion    History is obtained from the patient    History of Present Illness     Ani Porter is a 89 year old woman admitted on 10/6/2022. She has a history of dementia, hypothyroidism, hypertension, CVA, PRABHJOT, diastolic heart failure and was transferred here with reported worsened delirium and possible early pneumonia.    The patient is entirely disoriented and cannot provide a history.     From the outside records I have, it appears she was sent in to an ED from her assisted living facility with confusion and hallucinations.  She was thought to possibly have an early pneumonia and was started on Zosyn and transferred here.  Records are somewhat unclear, but it appears she would not have access to her regular caregiver for some 36-48 hours and the outside team felt it was unsafe to return her to her assisted living facility.    Review of Systems    Review of systems not obtained due to patient factors - confusion    Past Medical History    I have reviewed this patient's medical history and updated it with pertinent information if needed.   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  Past Surgical History:   Procedure Laterality Date     APPENDECTOMY       ARTHROPLASTY KNEE BILATERAL       BACK SURGERY      CAN'T REMEMBER WHAT THE SURGERY WAS FOR     EYE SURGERY      CAN'T REMEMBER WHAT KIND OF EYE SURGERY     HYSTERECTOMY       OTHER SURGICAL HISTORY      BLADDER STIMULATOR       Social History   I have reviewed this patient's social history and updated it with pertinent information if needed.  Social History     Tobacco Use     Smoking status: Never Smoker     Smokeless tobacco: Never Used   Substance Use Topics     Alcohol use: Yes     Alcohol/week: 2.5 standard drinks     Comment: Alcoholic Drinks/day: very rarely     Drug use: No       Family History     Unable to obtain due to: confusion      Prior to Admission Medications   Prior to Admission Medications   Prescriptions Last Dose Informant Patient Reported? Taking?   DULoxetine (CYMBALTA) 60 MG capsule   Yes No   Sig: Take 60 mg by mouth At Bedtime   Lactobacillus (PROBIOTIC ACIDOPHILUS PO)   Yes No   Sig: Take 1 capsule by mouth daily (with dinner)    Multiple Vitamins-Minerals (CENTRUM SILVER 50+WOMEN PO)   Yes No   Sig: Take 1 tablet by mouth daily (with dinner)    Multiple Vitamins-Minerals (PRESERVISION AREDS) CAPS   Yes No   Sig: Take 1 capsule by mouth 2 times daily (with meals) With lunch and supper.   QUEtiapine (SEROQUEL) 25 MG tablet   Yes No   Sig: Take 25 mg by mouth At Bedtime   acetaminophen (TYLENOL) 500 MG tablet   Yes No   Sig: Take 1,000 mg by mouth 2 times daily   albuterol (PROAIR HFA/PROVENTIL HFA/VENTOLIN HFA)  108 (90 Base) MCG/ACT inhaler   Yes No   Sig: Inhale 1-2 puffs into the lungs every 4 hours as needed   aspirin (ASA) 81 MG EC tablet   Yes No   Sig: Take 81 mg by mouth daily (with dinner)    benzonatate (TESSALON) 100 MG capsule   Yes No   Sig: Take 100 mg by mouth At Bedtime   calcium carbonate (OS-CHAMP) 1500 (600 Ca) MG tablet   Yes No   Sig: Take 600 mg by mouth daily   cetirizine (ZYRTEC) 10 MG tablet   Yes No   Sig: Take 10 mg by mouth daily as needed for allergies   cholecalciferol 25 MCG (1000 UT) TABS   Yes No   Sig: Take 2,000 Units by mouth daily (with dinner)   famotidine (PEPCID) 20 MG tablet   Yes No   Sig: Take 20 mg by mouth 2 times daily With lunch and at bedtime.   fluticasone (FLONASE) 50 MCG/ACT nasal spray   Yes No   Sig: Spray 1 spray into both nostrils daily   gabapentin (NEURONTIN) 100 MG capsule   Yes No   Sig: Take 100 mg by mouth At Bedtime   hydrALAZINE (APRESOLINE) 25 MG tablet   No No   Sig: Take 1 tablet (25 mg) by mouth 2 times daily With lunch and at bedtime.   Patient taking differently: Take 50 mg by mouth 2 times daily With lunch and at bedtime.   ibuprofen (ADVIL/MOTRIN) 200 MG tablet   Yes No   Sig: Take 200 mg by mouth Take 600 mg every morning and 400 mg every evening   levothyroxine (SYNTHROID/LEVOTHROID) 50 MCG tablet   Yes No   Sig: Take 50 mcg by mouth daily before breakfast    loperamide (IMODIUM) 2 MG capsule   Yes No   Sig: Take 2 mg by mouth 4 times daily as needed   losartan (COZAAR) 100 MG tablet   Yes No   Sig: Take 100 mg by mouth daily (with lunch)   medical cannabis (Patient's own supply)   Yes No   Sig: Take 1 Dose by mouth See Admin Instructions (The purpose of this order is to document that the patient reports taking medical cannabis.  This is not a prescription, and is not used to certify that the patient has a qualifying medical condition.)    Tangerine capsule  Take 1 capsule in the morning and 1 hour prior to sleep with milk    Emulsion with supper    melatonin 5 MG tablet   Yes No   Sig: Take 5 mg by mouth At Bedtime   metoprolol succinate ER (TOPROL XL) 50 MG 24 hr tablet   Yes No   Sig: Take 50 mg by mouth daily   polyethylene glycol-propylene glycol (SYSTANE) 0.4-0.3 % SOLN ophthalmic solution   Yes No   Sig: Place 2 drops into both eyes 3 times daily as needed   vitamin B-12 (CYANOCOBALAMIN) 1000 MCG tablet   Yes No   Sig: Take 1,000 mcg by mouth daily (with lunch)      Facility-Administered Medications: None     Allergies   Allergies   Allergen Reactions     Other Allergy (See Comments) [External Allergen Needs Reconciliation - See Comment] Other (See Comments)     Patient Reports Reaction to FLU SHOT - Egg allergy!!, FLU SHOT - Egg allergy, PN: FLU SHOT - Egg allergy     Citalopram Analogues [Citalopram] Unknown and Other (See Comments)     Per pt and outside records     Demeclocycline Unknown     Doxycycline Unknown and Other (See Comments)     Per pt and outside records;     Erythromycin Unknown and Other (See Comments)     Per pt and outside records;      Erythromycin Ethylsuccinate [Erythromycin] Unknown     Hydromorphone Other (See Comments)     Depression, PN: Depression, Patient reports having depression     Iodine And Iodide Containing Products [Iodine] Other (See Comments) and Unknown     unknown, Per pt and outside records, pt's son confirms she is allergic to Iodine, PN: unknown     Sulfa (Sulfonamide Antibiotics) [Sulfa Drugs] Unknown, Other (See Comments) and Nausea and Vomiting     Confusion,, Other reaction(s): Confusion, Other (See Comments), confusion     Sulfasalazine Nausea and Vomiting     Terfenadine Unknown and Other (See Comments)     Other reaction(s): *Unknown, Per pt and outside records;     Tetracycline Other (See Comments) and Unknown     Per pt and outside records     Venlafaxine Analogues [Venlafaxine] Unknown     Dizziness and confusion     Cephalosporins Nausea and Vomiting, Itching, Other (See Comments) and Rash      Itching     Prednisone Unknown, Anxiety and Other (See Comments)     Other reaction(s): Confusion, Hallucinations, Confusion, Confusion, hallucination,       Physical Exam   Vital Signs: Temp: (!) 96.4  F (35.8  C) Temp src: Oral BP: (!) 193/78 Pulse: 66   Resp: 16 SpO2: 98 % O2 Device: None (Room air)      Physical Exam   Constitutional:   Well nourished, well developed, resting comfortably   Head: Normocephalic and atraumatic.   Eyes: Conjunctivae are normal. Pupils are equal, round, and reactive to light.    Oropharynx: Pharynx has no erythema or exudate, mucous membranes are moist  Cardiovascular: Regular rate and rhythm without murmurs or gallops  Pulmonary/Chest: Clear to auscultation bilaterally, with no wheezes or retractions. No respiratory distress.  GI: Soft with good bowel sounds.  Non-tender, non-distended, with no guarding, no rebound, no peritoneal signs.   Musculoskeletal:  No edema or clubbing   Skin: Skin is warm and dry. No rash noted.   Neurological: Entirely disoriented, poor attention.  Psychiatric:  Appears to have significant delirium/dementia      Data   Data reviewed today: I reviewed all medications, new labs and imaging results over the last 24 hours. I personally reviewed outside imaging and labs.

## 2022-10-07 NOTE — PLAN OF CARE
Goal Outcome Evaluation:    VS: VSS. Afebrile    O2: >90% on RA. Denies chest pain or SOB   Output: Voids adequately in bathroom, had two incontinent episodes in bed    Last BM: Unable to recall. Bowel sounds active x4   Activity: 1 assist w/ walker and gaitbelt   Skin: Intact   Pain: No pain    CMS: Disoriented to time, place, situation.    Dressing: None   Diet: Regular, tolerating well    LDA: PIV R forearm    Equipment: IV pole/pump, walker, personal belongings, call light within reach   Plan: TBD    Additional Info: Pending UA result. Potassium was at 3.3, given one dose of K per order

## 2022-10-07 NOTE — PROVIDER NOTIFICATION
Message sent to cross cover provider:  just an FYI- Admit from [facility] just arrived to [address] [place] [address] room 524. We need admission orders. Thanks   Heladio [id number]  
ESRD on dialysis

## 2022-10-07 NOTE — PLAN OF CARE
VS: BP (!) 161/64 (BP Location: Left arm)   Pulse 55   Temp (!) 96.3  F (35.7  C) (Axillary)   Resp 15   Wt 70.1 kg (154 lb 8.7 oz)   SpO2 96%   BMI 28.27 kg/m     O2: >90% on room air. Denies SOB/N/V/chest pain    Output: Incontinent at times, brief on. Time toilet schedule offered   Last BM: Unknown. Bowel sounds active in all quad.   Activity: Ax1 with walker and gait belt.    Skin: Intact    Pain: Denies    CMS: Alert to self only. Easily redirectable. Bed/chair alarm for safety    Dressing: NA   Diet: Regular diet. Need help ordering meals.    LDA: PIV saline locked    Equipment: Walker, gait belt, IV pole/pump, personal belongings, call light within reach    Plan: TBD   Additional Info:

## 2022-10-07 NOTE — PROGRESS NOTES
Care Management Follow Up    Length of Stay (days): 1    Expected Discharge Date: 10/08/2022     Concerns to be Addressed:  Discharge planning      Patient plan of care discussed at interdisciplinary rounds: Yes    Anticipated Discharge Disposition:  [facility] [address] [address]   PH: [phone]  Nurses Station: [phone]       Anticipated Discharge Services:  Continued HC services from [facility].  Anticipated Discharge DME:  none    Patient/family educated on Medicare website which has current facility and service quality ratings:  N/A  Education Provided on the Discharge Plan:  No  Patient/Family in Agreement with the Plan:  Not discussed    Referrals Placed by CM/SW:  none  Private pay costs discussed:  Not at this time.    Additional Information:  Per IDT rounds, pt will be medically ready to discharge tomorrow, Saturday 10/8/22.    [address] [address]   PH: [phone]  1020: GAYE left  for nurse's station, requesting a call back to discuss pt's return to [facility].  1105: Ariane left VM for SW, provided call back #: [phone].  1325: GAYE left VM for Ariane at nurses' station ([phone]), requested a call back to discuss pt's status.        KIA Huddleston, LSW  [facility] Orth[address]   PHONE: [phone]  Pager: [phone]

## 2022-10-08 ENCOUNTER — APPOINTMENT (OUTPATIENT)
Dept: PHYSICAL THERAPY | Facility: CLINIC | Age: 87
DRG: 091 | End: 2022-10-08
Attending: INTERNAL MEDICINE
Payer: MEDICARE

## 2022-10-08 ENCOUNTER — APPOINTMENT (OUTPATIENT)
Dept: OCCUPATIONAL THERAPY | Facility: CLINIC | Age: 87
DRG: 091 | End: 2022-10-08
Attending: INTERNAL MEDICINE
Payer: MEDICARE

## 2022-10-08 LAB — BACTERIA UR CULT: NO GROWTH

## 2022-10-08 PROCEDURE — 97162 PT EVAL MOD COMPLEX 30 MIN: CPT | Mod: GP

## 2022-10-08 PROCEDURE — 97530 THERAPEUTIC ACTIVITIES: CPT | Mod: GP

## 2022-10-08 PROCEDURE — 99232 SBSQ HOSP IP/OBS MODERATE 35: CPT | Performed by: INTERNAL MEDICINE

## 2022-10-08 PROCEDURE — 250N000013 HC RX MED GY IP 250 OP 250 PS 637: Performed by: NURSE PRACTITIONER

## 2022-10-08 PROCEDURE — 120N000002 HC R&B MED SURG/OB UMMC

## 2022-10-08 PROCEDURE — 97166 OT EVAL MOD COMPLEX 45 MIN: CPT | Mod: GO

## 2022-10-08 PROCEDURE — 250N000013 HC RX MED GY IP 250 OP 250 PS 637: Performed by: INTERNAL MEDICINE

## 2022-10-08 PROCEDURE — 250N000011 HC RX IP 250 OP 636: Performed by: NURSE PRACTITIONER

## 2022-10-08 PROCEDURE — 97535 SELF CARE MNGMENT TRAINING: CPT | Mod: GO

## 2022-10-08 PROCEDURE — 97116 GAIT TRAINING THERAPY: CPT | Mod: GP

## 2022-10-08 RX ORDER — HYDRALAZINE HYDROCHLORIDE 10 MG/1
10 TABLET, FILM COATED ORAL 4 TIMES DAILY PRN
Status: DISCONTINUED | OUTPATIENT
Start: 2022-10-08 | End: 2022-10-10 | Stop reason: HOSPADM

## 2022-10-08 RX ORDER — IBUPROFEN 200 MG
400 TABLET ORAL EVERY 6 HOURS PRN
Status: DISCONTINUED | OUTPATIENT
Start: 2022-10-08 | End: 2022-10-10 | Stop reason: HOSPADM

## 2022-10-08 RX ADMIN — DULOXETINE HYDROCHLORIDE 60 MG: 60 CAPSULE, DELAYED RELEASE ORAL at 21:11

## 2022-10-08 RX ADMIN — FAMOTIDINE 20 MG: 20 TABLET ORAL at 11:13

## 2022-10-08 RX ADMIN — Medication 2000 UNITS: at 17:22

## 2022-10-08 RX ADMIN — ASPIRIN 81 MG: 81 TABLET, COATED ORAL at 17:22

## 2022-10-08 RX ADMIN — QUETIAPINE FUMARATE 25 MG: 25 TABLET ORAL at 21:11

## 2022-10-08 RX ADMIN — ENOXAPARIN SODIUM 40 MG: 40 INJECTION SUBCUTANEOUS at 09:22

## 2022-10-08 RX ADMIN — CYANOCOBALAMIN TAB 1000 MCG 1000 MCG: 1000 TAB at 11:13

## 2022-10-08 RX ADMIN — LEVOTHYROXINE SODIUM 50 MCG: 50 TABLET ORAL at 09:23

## 2022-10-08 RX ADMIN — AMOXICILLIN AND CLAVULANATE POTASSIUM 1 TABLET: 875; 125 TABLET, FILM COATED ORAL at 19:47

## 2022-10-08 RX ADMIN — Medication 1 CAPSULE: at 17:22

## 2022-10-08 RX ADMIN — ACETAMINOPHEN 1000 MG: 325 TABLET, FILM COATED ORAL at 09:21

## 2022-10-08 RX ADMIN — GABAPENTIN 100 MG: 100 CAPSULE ORAL at 21:11

## 2022-10-08 RX ADMIN — Medication 1 CAPSULE: at 09:21

## 2022-10-08 RX ADMIN — METOPROLOL SUCCINATE 50 MG: 50 TABLET, EXTENDED RELEASE ORAL at 09:22

## 2022-10-08 RX ADMIN — LOSARTAN POTASSIUM 100 MG: 100 TABLET, FILM COATED ORAL at 11:13

## 2022-10-08 RX ADMIN — ACETAMINOPHEN 1000 MG: 325 TABLET, FILM COATED ORAL at 19:47

## 2022-10-08 RX ADMIN — AMOXICILLIN AND CLAVULANATE POTASSIUM 1 TABLET: 875; 125 TABLET, FILM COATED ORAL at 11:13

## 2022-10-08 RX ADMIN — FAMOTIDINE 20 MG: 20 TABLET ORAL at 21:11

## 2022-10-08 RX ADMIN — HYDRALAZINE HYDROCHLORIDE 50 MG: 25 TABLET ORAL at 21:11

## 2022-10-08 RX ADMIN — HYDRALAZINE HYDROCHLORIDE 10 MG: 10 TABLET ORAL at 23:41

## 2022-10-08 RX ADMIN — IBUPROFEN 600 MG: 200 TABLET, FILM COATED ORAL at 09:22

## 2022-10-08 RX ADMIN — HYDRALAZINE HYDROCHLORIDE 50 MG: 25 TABLET ORAL at 11:13

## 2022-10-08 ASSESSMENT — ACTIVITIES OF DAILY LIVING (ADL)
ADLS_ACUITY_SCORE: 45

## 2022-10-08 NOTE — PROGRESS NOTES
Care Management Follow Up    Length of Stay (days): 2    Expected Discharge Date: 10/10/22, pending      Concerns to be Addressed: Discharge planning       Patient plan of care discussed at interdisciplinary rounds: Yes    Anticipated Discharge Disposition: Return to her Assisted Living (correction)   Anticipated Discharge Services:  Resume previous services   Anticipated Discharge DME: None     Education Provided on the Discharge Plan:    Patient/Family in Agreement with the Plan:      Additional Information:  Spoke to nurse Penny at [facility]. Patient has a private paid care giver through [facility] that assists every day from 9am-3pm (except Fridays and Sundays). The care giver assists with ALD's and medication set up/reminders. The correction staff assists when [facility] is not there. Of note, patient will be moving to their memory care by November 1st. Patient is able to return tomorrow at the earliest if her step sonJamie is able to coordinate care with [facility] or if family is able to assist until Monday.  If not, she will need to stay until Monday. Penny would like to make sure patient's care plan/services are updated prior to her return. Left VM for Jamie.      11:47 AM  Spoke to Jamie and [facility]/family are not able to provide assist tomorrow. Plan is for patient to return on Monday. Updated patient's step sonAb per Jamie's request. Updated Penny.     2:12 PM  Reviewed chart further and saw encounters from [facility] ([phone])- RNCC to follow up on Monday to confirm if she is open to them.     [facility]  [address] [place] [address]   PH: [phone]  Nurses Station: [phone]      RAI Loaiza RNCC  RN Care Coordinator   Office: [phone]   Pager: [phone]

## 2022-10-08 NOTE — PROGRESS NOTES
"   10/08/22 0850   Quick Adds   Type of Visit Initial Occupational Therapy Evaluation   Living Environment   People in Home alone   Current Living Arrangements assisted living;apartment  (pt referenced both. difficult to determine)   Living Environment Comments Pt with increased confusion, unable to fully gather living environment information.   Self-Care   Usual Activity Tolerance fair   Current Activity Tolerance fair   Activity/Exercise/Self-Care Comment Pt reports previously independent with self-cares, and receiving assistnace for IADLs. However, pt unable to report who assists with cares at home. Pt unable to recall home set-up and equipment used in home.   Instrumental Activities of Daily Living (IADL)   IADL Comments difficult to assess due to increased confusion   General Information   Onset of Illness/Injury or Date of Surgery 10/06/22   Referring Physician Darron Cameron MD   Patient/Family Therapy Goal Statement (OT) return home safely   Additional Occupational Profile Info/Pertinent History of Current Problem per chart: \"Ani Porter is a 89 year old woman admitted on 10/6/2022. She has a history of dementia, hypothyroidism, hypertension, CVA, PRABHJOT, diastolic heart failure and was transferred here with acute encephalopathy (worsened delirium) and ?possible early pneumonia vs UTI\"   Existing Precautions/Restrictions fall   Limitations/Impairments safety/cognitive   Left Upper Extremity (Weight-bearing Status) full weight-bearing (FWB)   Right Upper Extremity (Weight-bearing Status) full weight-bearing (FWB)   Left Lower Extremity (Weight-bearing Status) full weight-bearing (FWB)   Right Lower Extremity (Weight-bearing Status) full weight-bearing (FWB)   Cognitive Status Examination   Orientation Status person   Affect/Mental Status (Cognitive) confused   Follows Commands unable/difficult to assess  (max cues and prompting required for redirection)   Safety Deficit impulsivity;awareness of need for " assistance;problem-solving;unable/difficult to assess  (due to confusion, intermittent impulsivity)   Memory Deficit immediate recall;unable/difficult to assess   Attention Deficit concentration;distractible in noisy environment;focused/sustained attention   Executive Function Deficit insight/awareness of deficits;judgment;organization/sequencing   Cognitive Screens/Assessments   Cognitive Assessments Completed [facility] Mental Status Exam ([facility]):  Total Score out of /30 Attempted [facility] cognitive assessment, significant time required and difficulty focusing on questions with frequent confusion and poor ablity to maintain attention to task.   Visual Perception   Visual Impairment/Limitations corrective lenses full-time   Pain Assessment   Patient Currently in Pain No   Range of Motion Comprehensive   Comment, General Range of Motion BUE limited   Strength Comprehensive (MMT)   Comment, General Manual Muscle Testing (MMT) Assessment BUE limited  (arthritis in hands)   Coordination   Coordination Comments continue to assess coordination and fine motor skills.   Bed Mobility   Comment (Bed Mobility) modA with further education required   Transfers   Transfer Comments [place]-modA with FWW   Clinical Impression   Criteria for Skilled Therapeutic Interventions Met (OT) Yes, treatment indicated   OT Diagnosis self-care impairment   OT Problem List-Impairments impacting ADL problems related to;activity tolerance impaired;balance;cognition;range of motion (ROM);strength   Assessment of Occupational Performance 3-5 Performance Deficits   Identified Performance Deficits dressing, bathing, toileting, g/h, home management   Planned Therapy Interventions (OT) ADL retraining;IADL retraining;balance training;bed mobility training;strengthening;ROM;transfer training;home program guidelines;progressive activity/exercise;risk factor education;cognition   Clinical Decision Making Complexity (OT) moderate complexity    Anticipated Equipment Needs Upon Discharge (OT)   (TBD)   Risk & Benefits of therapy have been explained evaluation/treatment results reviewed;care plan/treatment goals reviewed;risks/benefits reviewed;current/potential barriers reviewed;participants voiced agreement with care plan;participants included;patient   OT Discharge Planning   OT Discharge Recommendation (DC Rec) Transitional Care Facility;home with assist   OT Rationale for DC Rec pt increased confusion and poor safety which impact perforamcne in daily self-care activities, would benefit from continued serices at [facility] to progress independence. if pt were to dc home she would require 24/7 assist due to increased confusion and poor safety.   OT Brief overview of current status modA self-cares and [place] functional mobility with poor safety awareness and confusion   Total Evaluation Time (Minutes)   Total Evaluation Time (Minutes) 5   OT Goals   Therapy Frequency (OT) 4 times/wk   OT Predicted Duration/Target Date for Goal Attainment 10/29/22   OT Goals Hygiene/Grooming;Lower Body Dressing;Toilet Transfer/Toileting;Cognition   OT: Hygiene/Grooming supervision/stand-by assist;using adaptive equipment;while standing   OT: Lower Body Dressing Supervision/stand-by assist;using adaptive equipment   OT: Toilet Transfer/Toileting Supervision/stand-by assist;toilet transfer;cleaning and garment management;using adaptive equipment   OT: Cognitive Patient/caregiver will verbalize understanding of cognitive assessment results/recommendations as needed for safe discharge planning

## 2022-10-08 NOTE — PROGRESS NOTES
10/08/22 1100   Living Environment   People in Home alone   Current Living Arrangements assisted living;apartment   Living Environment Comments difficult gethering proper information but pt states she lives in a assisted living and has elevators in her building.   Self-Care   Usual Activity Tolerance fair   Current Activity Tolerance fair   Activity/Exercise/Self-Care Comment pt reports she lives alone and takes care of her needs IND. has grab bar and FWW at home for amb.   Cognition   Affect/Mental Status (Cognition) confused   Orientation Status (Cognition) disoriented to;time   Pain Assessment   Patient Currently in Pain Yes, see Vital Sign flowsheet   Strength (Manual Muscle Testing)   Strength (Manual Muscle Testing) strength is WFL   Strength Comments not formally tested but within functional limits   Bed Mobility   Comment, (Bed Mobility) SBA with bed mobility   Transfers   Comment, (Transfers) SBA with sit to stand transfer to FWW   Gait/Stairs (Locomotion)   [place] Level (Gait) contact guard   Assistive Device (Gait) walker, standard   Distance in Feet (Required for LE Total Joints) 200'   Pattern (Gait) step-to   Deviations/Abnormal Patterns (Gait) stride length decreased;patric decreased;festinating/shuffling   Clinical Impression   Criteria for Skilled Therapeutic Intervention Yes, treatment indicated   PT Diagnosis (PT) impaired functional mobility   Influenced by the following impairments pain, dementia/confusion, weakness   Functional limitations due to impairments ambulating safely   Clinical Presentation (PT Evaluation Complexity) Evolving/Changing   Clinical Presentation Rationale clinician judgement   Clinical Decision Making (Complexity) moderate complexity   Planned Therapy Interventions (PT) gait training;home exercise program;strengthening;home program guidelines;progressive activity/exercise;risk factor education   Anticipated Equipment Needs at Discharge (PT) other (see  comments)  (has walker and cane at home)   Risk & Benefits of therapy have been explained patient   PT Discharge Planning   PT Discharge Recommendation (DC Rec) Long term care facility   PT Rationale for DC Rec primary d/c recommendation to long term facility due to patient cognition, weakness and need for 24/7 assistance.   PT Brief overview of current status up with SBA to FWW   Total Evaluation Time   Total Evaluation Time (Minutes) 8   Physical Therapy Goals   PT Frequency Daily   PT Predicted Duration/Target Date for Goal Attainment 10/10/22   PT Goals Gait   PT: Gait Supervision/stand-by assist;Greater than 200 feet

## 2022-10-08 NOTE — PLAN OF CARE
VS: BP (!) 179/78 (BP Location: Left arm)   Pulse 55   Temp (!) 95.5  F (35.3  C) (Oral)   Resp 15   Wt 70.1 kg (154 lb 8.7 oz)   SpO2 98%   BMI 28.27 kg/m       O2: >90% on room air. Infrequent cough noted. Denies SOB/N/V/chest pain. IS encouraged    Output: Patient incontinent at times, brief on. Time toilet schedule offered    Last BM: Unknown. Bowel sounds active in all quad and passing flatus    Activity: Ax1 with walker and gait belt    Skin: Bruise to RUE, otherwise skin intact    Pain: Denies pain    CMS: Patient alert to self only. Bed/chair alarm for safety. Room near nurses station and door open.     Dressing: NA   Diet: Regular diet. Needs help ordering meals    LDA: No IV access    Equipment: Walker, gait belt, IV pole/pump, personal belongings, call light within patient reach    Plan: TBD    Additional Info:

## 2022-10-08 NOTE — PROGRESS NOTES
"[facility]    Medicine Progress Note - Hospitalist Service, GOLD TEAM 16    Date of Admission:  10/6/2022    Assessment & Plan          Ani Porter is a 89 year old woman admitted on 10/6/2022. She has a history of dementia, hypothyroidism, hypertension, CVA, PRABHJOT, diastolic heart failure and was transferred here with acute encephalopathy (worsened delirium) and ?possible early pneumonia vs UTI.      1) Dementia, acute encephalopathy - On adm: reportedly more confused today than usual.  On assessment she is entirely disoriented.  MD spoke with staff at her assisted living facility and they told that its \"hit and miss\" with orientation so it appears she does have significant dementia. Noted no new neurological deficit on exam.  Outside UA borderline for infection with slightly elevated leuk esterase, outside CXR with some mild R basilar opacities: atelectasis vs pna. CT head with no acute changes.  10/7/2022: more alert and interactive, oriented to self. Pleasant.   No clinical e.o sepsis or infection. procal neg. Afebrile. VSS.   UC: no growth    - 10/7: given no clear e.o infection and pt appears almost at baseline, discontinued iv antibiotics. Plan to monitor off antibiotics.  10/8: however patient reported some ongoing cough and said felt better after the antibiotics.   Will complete 5 days course of abx. Start on augmentin x 7 more doses.   Encourage IS.     - PT, OT consultation   - , CC consultation for dispo planning  - Fall precautions. Bed alarm. 1:1 prn  - Delirium precautions. Watch for sundowning. Ensure good sleep hygiene. Hydration. etc       2) History of hypertension - Modestly elevated at present likely secondary to missing earlier doses.  - Continue home hydralazine, losartan, metoprolol. PRN hydralazine for sbp>160  Monitor.      3) History of COPD   - Continue home albuterol     4) History of GERD  - Continue home famotidine     5) History of " PRABHJOT  - CPAP overnight    6) Hypokalemia: K 3.3 10/7  - Kcl 40 meq po x 1.         Diet: Combination Diet Regular Diet Adult    DVT Prophylaxis: Enoxaparin (Lovenox) SQ  Pantoja Catheter: Not present  Central Lines: None  Cardiac Monitoring: None  Code Status: No CPR- Do NOT Intubate      Disposition Plan      Expected Discharge Date: 10/10/2022                The patient's care was discussed with the Bedside Nurse, Care Coordinator/ and Patient.  * patient did not want me call any family contact !    Darron Cameron MD  Hospitalist Service, GOLD TEAM 16  [facility]  Securely message with the Vocera Web Console (learn more here)  Text page via [facility] Paging/Directory   Please see signed in provider for up to date coverage information      Clinically Significant Risk Factors Present on Admission                      ______________________________________________________________________    Interval History   Interval events reviewed.   States doing better  Denies any pain  Denies fever or chills.   Intermittent cough+   No cp or sob.   No NV or pain abdomen.   No dysuria or bowel complaint.   No new sensory or motor complaint.   No new rash.    No other new or acute medical concern      Data reviewed today: I reviewed all medications, new labs and imaging results over the last 24 hours. I personally reviewed no images or EKG's today.    Physical Exam   Vital Signs: Temp: (!) 95.5  F (35.3  C) Temp src: Oral BP: (!) 146/63 Pulse: 53   Resp: 15 SpO2: 98 % O2 Device: None (Room air)    Weight: 154 lbs 8.68 oz    General Appearance: Awake, interactive, NAD,  Respiratory: Normal work of breathing. Clear bl  Cardiovascular: RRR s1s2  GI: Soft. NT. ND.  Extremities: Distally wwp.   Skin: No acute rash on exposed areas.   Neuro: alert. Moving all extremities.   Others: Pleasant.         Data   Recent Labs   Lab 10/07/22  0550 10/06/22  1351   WBC 5.2 4.9   HGB 11.9 12.8    MCV 92 95    257    136   POTASSIUM 3.3* 4.1   CHLORIDE 105 95*   CO2 28 27   BUN 17 19.6   CR 0.67 0.77   ANIONGAP 6 14   CHAMP 9.0 9.4   * 165*   ALBUMIN  --  4.1   PROTTOTAL  --  6.5   BILITOTAL  --  0.7   ALKPHOS  --  53   ALT  --  15   AST  --  27     No results found for this or any previous visit (from the past 24 hour(s)).  Medications       acetaminophen  1,000 mg Oral BID     amoxicillin-clavulanate  1 tablet Oral Q12H [facility] (08/20)     aspirin  81 mg Oral Daily with supper     cyanocobalamin  1,000 mcg Oral Daily with lunch     DULoxetine  60 mg Oral At Bedtime     enoxaparin ANTICOAGULANT  40 mg Subcutaneous Q24H     famotidine  20 mg Oral BID     gabapentin  100 mg Oral At Bedtime     hydrALAZINE  50 mg Oral BID     levothyroxine  50 mcg Oral QAM AC     losartan  100 mg Oral Daily with lunch     metoprolol succinate ER  50 mg Oral Daily     multivitamin  with lutein  1 capsule Oral BID w/meals     QUEtiapine  25 mg Oral At Bedtime     sodium chloride (PF)  3 mL Intracatheter Q8H     Vitamin D3  2,000 Units Oral Daily with supper

## 2022-10-08 NOTE — PROGRESS NOTES
Pt oriented only to self. Bed alarm on for safety. Pt rated pain as tolerable.Tylenol and Ibuprofen given for pain control. CMS to baseline. Tolerated regular diet. Denied any nausea, CP, SOB, lightheadedness or dizziness. Voiding without pain or difficulty. Passing flatus. Up with SBA. Resting in bed at this time with call light in reach. Able to make needs known. Continue to monitor.

## 2022-10-09 ENCOUNTER — APPOINTMENT (OUTPATIENT)
Dept: PHYSICAL THERAPY | Facility: CLINIC | Age: 87
DRG: 091 | End: 2022-10-09
Attending: INTERNAL MEDICINE
Payer: MEDICARE

## 2022-10-09 LAB
CREAT SERPL-MCNC: 0.76 MG/DL (ref 0.52–1.04)
GFR SERPL CREATININE-BSD FRML MDRD: 73 ML/MIN/1.73M2
PLATELET # BLD AUTO: 206 10E3/UL (ref 150–450)
POTASSIUM BLD-SCNC: 3.8 MMOL/L (ref 3.4–5.3)

## 2022-10-09 PROCEDURE — 120N000002 HC R&B MED SURG/OB UMMC

## 2022-10-09 PROCEDURE — 97116 GAIT TRAINING THERAPY: CPT | Mod: GP

## 2022-10-09 PROCEDURE — 99232 SBSQ HOSP IP/OBS MODERATE 35: CPT | Performed by: INTERNAL MEDICINE

## 2022-10-09 PROCEDURE — 82565 ASSAY OF CREATININE: CPT | Performed by: NURSE PRACTITIONER

## 2022-10-09 PROCEDURE — 85049 AUTOMATED PLATELET COUNT: CPT | Performed by: NURSE PRACTITIONER

## 2022-10-09 PROCEDURE — 250N000011 HC RX IP 250 OP 636: Performed by: NURSE PRACTITIONER

## 2022-10-09 PROCEDURE — 84132 ASSAY OF SERUM POTASSIUM: CPT | Performed by: INTERNAL MEDICINE

## 2022-10-09 PROCEDURE — 250N000013 HC RX MED GY IP 250 OP 250 PS 637: Performed by: NURSE PRACTITIONER

## 2022-10-09 PROCEDURE — 36415 COLL VENOUS BLD VENIPUNCTURE: CPT | Performed by: NURSE PRACTITIONER

## 2022-10-09 PROCEDURE — 97530 THERAPEUTIC ACTIVITIES: CPT | Mod: GP

## 2022-10-09 PROCEDURE — 250N000013 HC RX MED GY IP 250 OP 250 PS 637: Performed by: INTERNAL MEDICINE

## 2022-10-09 RX ADMIN — SENNOSIDES AND DOCUSATE SODIUM 2 TABLET: 50; 8.6 TABLET ORAL at 09:49

## 2022-10-09 RX ADMIN — METOPROLOL SUCCINATE 50 MG: 50 TABLET, EXTENDED RELEASE ORAL at 09:49

## 2022-10-09 RX ADMIN — QUETIAPINE FUMARATE 25 MG: 25 TABLET ORAL at 21:10

## 2022-10-09 RX ADMIN — CYANOCOBALAMIN TAB 1000 MCG 1000 MCG: 1000 TAB at 13:48

## 2022-10-09 RX ADMIN — ASPIRIN 81 MG: 81 TABLET, COATED ORAL at 17:36

## 2022-10-09 RX ADMIN — FAMOTIDINE 20 MG: 20 TABLET ORAL at 13:47

## 2022-10-09 RX ADMIN — AMOXICILLIN AND CLAVULANATE POTASSIUM 1 TABLET: 875; 125 TABLET, FILM COATED ORAL at 20:03

## 2022-10-09 RX ADMIN — HYDRALAZINE HYDROCHLORIDE 50 MG: 25 TABLET ORAL at 21:09

## 2022-10-09 RX ADMIN — AMOXICILLIN AND CLAVULANATE POTASSIUM 1 TABLET: 875; 125 TABLET, FILM COATED ORAL at 09:49

## 2022-10-09 RX ADMIN — Medication 2000 UNITS: at 17:36

## 2022-10-09 RX ADMIN — GABAPENTIN 100 MG: 100 CAPSULE ORAL at 21:10

## 2022-10-09 RX ADMIN — Medication 1 MG: at 21:10

## 2022-10-09 RX ADMIN — ACETAMINOPHEN 1000 MG: 325 TABLET, FILM COATED ORAL at 20:03

## 2022-10-09 RX ADMIN — ENOXAPARIN SODIUM 40 MG: 40 INJECTION SUBCUTANEOUS at 09:50

## 2022-10-09 RX ADMIN — DULOXETINE HYDROCHLORIDE 60 MG: 60 CAPSULE, DELAYED RELEASE ORAL at 21:10

## 2022-10-09 RX ADMIN — FAMOTIDINE 20 MG: 20 TABLET ORAL at 21:10

## 2022-10-09 RX ADMIN — ACETAMINOPHEN 1000 MG: 325 TABLET, FILM COATED ORAL at 09:49

## 2022-10-09 RX ADMIN — LEVOTHYROXINE SODIUM 50 MCG: 50 TABLET ORAL at 09:49

## 2022-10-09 RX ADMIN — Medication 1 CAPSULE: at 17:36

## 2022-10-09 RX ADMIN — Medication 1 CAPSULE: at 09:49

## 2022-10-09 RX ADMIN — HYDRALAZINE HYDROCHLORIDE 50 MG: 25 TABLET ORAL at 13:47

## 2022-10-09 RX ADMIN — LOSARTAN POTASSIUM 100 MG: 100 TABLET, FILM COATED ORAL at 13:47

## 2022-10-09 ASSESSMENT — ACTIVITIES OF DAILY LIVING (ADL)
TRANSFERRING: 1-->ASSISTANCE (EQUIPMENT/PERSON) NEEDED
DIFFICULTY_EATING/SWALLOWING: NO
TOILETING: 0-->INDEPENDENT
TOILETING_ASSISTANCE: TOILETING DIFFICULTY, ASSISTANCE 1 PERSON
ADLS_ACUITY_SCORE: 47
FALL_HISTORY_WITHIN_LAST_SIX_MONTHS: NO
ADLS_ACUITY_SCORE: 47
WALKING_OR_CLIMBING_STAIRS: OTHER (SEE COMMENTS)
ADLS_ACUITY_SCORE: 46
WEAR_GLASSES_OR_BLIND: YES
DRESSING/BATHING_DIFFICULTY: YES
ADLS_ACUITY_SCORE: 47
TOILETING_ISSUES: YES
ADLS_ACUITY_SCORE: 47
ADLS_ACUITY_SCORE: 47
TRANSFERRING: 1-->ASSISTANCE (EQUIPMENT/PERSON) NEEDED (NOT DEVELOPMENTALLY APPROPRIATE)
EQUIPMENT_CURRENTLY_USED_AT_HOME: WALKER, STANDARD
ADLS_ACUITY_SCORE: 47
WALKING_OR_CLIMBING_STAIRS_DIFFICULTY: YES
DRESS: 1-->ASSISTANCE (EQUIPMENT/PERSON) NEEDED (NOT DEVELOPMENTALLY APPROPRIATE)
CHANGE_IN_FUNCTIONAL_STATUS_SINCE_ONSET_OF_CURRENT_ILLNESS/INJURY: NO
ADLS_ACUITY_SCORE: 47
DRESSING/BATHING: BATHING DIFFICULTY, ASSISTANCE 1 PERSON
DOING_ERRANDS_INDEPENDENTLY_DIFFICULTY: OTHER (SEE COMMENTS)
TOILETING: 0-->INDEPENDENT
ADLS_ACUITY_SCORE: 47
CONCENTRATING,_REMEMBERING_OR_MAKING_DECISIONS_DIFFICULTY: YES
DRESS: 1-->ASSISTANCE (EQUIPMENT/PERSON) NEEDED
ADLS_ACUITY_SCORE: 47
BATHING: 0-->INDEPENDENT
ADLS_ACUITY_SCORE: 47
ADLS_ACUITY_SCORE: 47

## 2022-10-09 NOTE — PLAN OF CARE
Goal Outcome Evaluation:      [id number]      VS: Elevated BP. PRN Hydralazinen given.BP improving.  Other vitals: stable    O2: Stable on room air   Output: Voids to the BR without difficulty, wears pull up briefs.    Last BM: Pt does not remember.  Senna PRN in place.    Activity: Up with SBA, gait belt and walker   Skin: Intact   Pain: Denies   CMS: Oriented to self.   Dressing: None   Diet: Regula. Needs help in ordering meals.    LDA: None   Equipment: IV pole, personal belongings, call light within reach, bed alarm on   Plan: Continue to monitor   Additional Info:  Bed alarm on.

## 2022-10-09 NOTE — PROGRESS NOTES
"[facility]    Medicine Progress Note - Hospitalist Service, GOLD TEAM 16    Date of Admission:  10/6/2022    Assessment & Plan          Ani Porter is a 89 year old woman admitted on 10/6/2022. She has a history of dementia, hypothyroidism, hypertension, CVA, PRABHJOT, diastolic heart failure and was transferred here with acute encephalopathy (worsened delirium) and ?possible early pneumonia vs UTI.     Today's changes: 10/9/2022  Doing well.   No new concern/changes  Son updated.   Aw safe dispo planning.      1) Dementia, acute encephalopathy - On adm: reportedly more confused today than usual.  On assessment she is entirely disoriented.  MD spoke with staff at her assisted living facility and they told that its \"hit and miss\" with orientation so it appears she does have significant dementia. Noted no new neurological deficit on exam.  Outside UA borderline for infection with slightly elevated leuk esterase, outside CXR with some mild R basilar opacities: atelectasis vs pna. CT head with no acute changes.  10/7/2022: more alert and interactive, oriented to self. Pleasant.   No clinical e.o sepsis or infection. procal neg. Afebrile. VSS.   UC: no growth    - 10/7: given no clear e.o infection and pt appears almost at baseline, discontinued iv antibiotics. Plan to monitor off antibiotics.  10/8: however patient reported some ongoing cough and said felt better after the antibiotics.   Will complete 5 days course of abx. Start on augmentin x 7 more doses.   Encourage IS.     - PT, OT consultation   - SW, CC consultation for dispo planning  - Fall precautions. Bed alarm. 1:1 prn  - Delirium precautions. Watch for sundowning. Ensure good sleep hygiene. Hydration. etc       2) History of hypertension - Modestly elevated at present likely secondary to missing earlier doses.  - Continue home hydralazine, losartan, metoprolol. PRN hydralazine for sbp>160  Monitor.      3) " History of COPD   - Continue home albuterol     4) History of GERD  - Continue home famotidine     5) History of PRABHJOT  - CPAP overnight    6) Hypokalemia: K 3.3 10/7  - Kcl 40 meq po x 1.         Diet: Combination Diet Regular Diet Adult    DVT Prophylaxis: Enoxaparin (Lovenox) SQ  Pantoja Catheter: Not present  Central Lines: None  Cardiac Monitoring: None  Code Status: No CPR- Do NOT Intubate      Disposition Plan     Expected Discharge Date: 10/10/2022      Destination: assisted living          The patient's care was discussed with the Bedside Nurse, Care Coordinator/ and Patient.  * patient did not want me call any family contact !    Darron Cameron MD  Hospitalist Service, GOLD TEAM [address] [place] [address]  Securely message with the Vocera Web Console (learn more here)  Text page via [employer] Paging/Directory   Please see signed in provider for up to date coverage information      Clinically Significant Risk Factors Present on Admission                      ______________________________________________________________________    Interval History   Interval events reviewed.   States doing better  Denies any pain  Denies fever or chills.   Intermittent cough+   No cp or sob.   No NV or pain abdomen.   No new sensory or motor complaint.   No new rash.    No other new or acute medical concern      Data reviewed today: I reviewed all medications, new labs and imaging results over the last 24 hours. I personally reviewed no images or EKG's today.    Physical Exam   Vital Signs: Temp: (!) 96.5  F (35.8  C) Temp src: Axillary BP: (!) 186/85 Pulse: 58   Resp: 18 SpO2: 94 % O2 Device: None (Room air)    Weight: 154 lbs 8.68 oz    General Appearance: Awake, interactive, NAD,  Respiratory: Normal work of breathing. Clear bl  Cardiovascular: RRR s1s2  GI: Soft. NT. ND.  Extremities: Distally wwp.   Skin: No acute rash on exposed areas.   Neuro: alert. Moving all extremities.    Others: Pleasant.         Data   Recent Labs   Lab 10/09/22  0710 10/07/22  0550 10/06/22  1351   WBC  --  5.2 4.9   HGB  --  11.9 12.8   MCV  --  92 95    213 257   NA  --  139 136   POTASSIUM 3.8 3.3* 4.1   CHLORIDE  --  105 95*   CO2  --  28 27   BUN  --  17 19.6   CR 0.76 0.67 0.77   ANIONGAP  --  6 14   CHAMP  --  9.0 9.4   GLC  --  109* 165*   ALBUMIN  --   --  4.1   PROTTOTAL  --   --  6.5   BILITOTAL  --   --  0.7   ALKPHOS  --   --  53   ALT  --   --  15   AST  --   --  27     No results found for this or any previous visit (from the past 24 hour(s)).  Medications       acetaminophen  1,000 mg Oral BID     amoxicillin-clavulanate  1 tablet Oral Q12H [facility] (08/20)     aspirin  81 mg Oral Daily with supper     cyanocobalamin  1,000 mcg Oral Daily with lunch     DULoxetine  60 mg Oral At Bedtime     enoxaparin ANTICOAGULANT  40 mg Subcutaneous Q24H     famotidine  20 mg Oral BID     gabapentin  100 mg Oral At Bedtime     hydrALAZINE  50 mg Oral BID     levothyroxine  50 mcg Oral QAM AC     losartan  100 mg Oral Daily with lunch     metoprolol succinate ER  50 mg Oral Daily     multivitamin  with lutein  1 capsule Oral BID w/meals     QUEtiapine  25 mg Oral At Bedtime     sodium chloride (PF)  3 mL Intracatheter Q8H     Vitamin D3  2,000 Units Oral Daily with supper

## 2022-10-09 NOTE — PLAN OF CARE
VS: BP (!) 148/63 (BP Location: Left arm)   Pulse 56   Temp (!) 96.5  F (35.8  C) (Axillary)   Resp 18   Wt 70.1 kg (154 lb 8.7 oz)   SpO2 94%   BMI 28.27 kg/m     O2: >90% on room, denies SOB/N/V   Output: Patient incontinent at times, amber care completed.    Last BM: Unknown    Activity: Ax1 walker and gait belt    Skin: Intact    Pain: Denies pain    CMS: Alert to self only. Bed/chair alarm on for safety    Dressing: NA   Diet: Regular diet, needs assistance ordering meal    LDA: No IV access    Equipment: Walker, gait belt, IV pole/pump, personal belongings, call light within reach    Plan: Possible discharge to [facility] tomorrow    Additional Info:

## 2022-10-09 NOTE — PLAN OF CARE
VS: BP (!) 160/78 (BP Location: Left arm)   Pulse 52   Temp (!) 95.3  F (35.2  C) (Axillary)   Resp 18   Wt 70.1 kg (154 lb 8.7 oz)   SpO2 98%   BMI 28.27 kg/m     O2: Stable on room air   Output: WNL, pt denies concerns at this time   Last BM: Pt does not know   Activity: Up with SBA, gait belt and walker   Skin: Intact   Pain: Pt denies   CMS: Disoriented to situation and time, otherwise intact.   Dressing: None   Diet: Regular, tolerated well   LDA: None   Equipment: IV pole, personal belongings, call light within reach, bed alarm on   Plan: Continue to monitor   Additional Info:

## 2022-10-09 NOTE — PROGRESS NOTES
Care Management Follow Up    Length of Stay (days): 3    Expected Discharge Date: 10/10/22     Concerns to be Addressed: Discharge planning       Patient plan of care discussed at interdisciplinary rounds: Yes    Anticipated Discharge Disposition: Return to her Assisted Living ([facility])   Anticipated Discharge Services:  Resume previous services, Including home care- PT/OT/RN  Anticipated Discharge DME: None     Education Provided on the Discharge Plan:  yes  Patient/Family in Agreement with the Plan:  yes    Additional Information:  Spoke to Paloma at [facility]. Patient is open to them for PT/OT and nursing was planning to start services. Resumption of care orders are in. RNCC to call [facility] and fax orders day of discharge.      History   Spoke to nurse Penny at [facility]. Patient has a private paid care giver through [facility] that assists every day from 9am-3pm (except Fridays and Sundays). The care giver assists with ALD's and medication set up/reminders. The [facility] staff assists when [facility] is not there. Of note, patient will be moving to their memory care by November 1st. Patient is able to return tomorrow at the earliest if her step sonJamie is able to coordinate care with [facility] or if family is able to assist until Monday.  If not, she will need to stay until Monday. Penny would like to make sure patient's care plan/services are updated prior to her return. Left VM for Jamie.      11:47 AM  Spoke to Jamie and [facility] [employer]/family are not able to provide assist tomorrow. Plan is for patient to return on Monday. Updated patient's step sonAb per Jamie's request. Updated Penny.       [facility]  [address]  [place] [address]   PH: [phone]  Nurses Station: [phone]    [facility] ([phone]), Fax- [phone]    RAI Loaiza RNCC  RN Care Coordinator   Office: [phone]   Pager: [phone]

## 2022-10-10 ENCOUNTER — APPOINTMENT (OUTPATIENT)
Dept: PHYSICAL THERAPY | Facility: CLINIC | Age: 87
DRG: 091 | End: 2022-10-10
Attending: INTERNAL MEDICINE
Payer: MEDICARE

## 2022-10-10 ENCOUNTER — APPOINTMENT (OUTPATIENT)
Dept: OCCUPATIONAL THERAPY | Facility: CLINIC | Age: 87
DRG: 091 | End: 2022-10-10
Attending: INTERNAL MEDICINE
Payer: MEDICARE

## 2022-10-10 VITALS
TEMPERATURE: 96.2 F | HEART RATE: 53 BPM | OXYGEN SATURATION: 97 % | BODY MASS INDEX: 28.27 KG/M2 | DIASTOLIC BLOOD PRESSURE: 59 MMHG | RESPIRATION RATE: 16 BRPM | SYSTOLIC BLOOD PRESSURE: 141 MMHG | WEIGHT: 154.54 LBS

## 2022-10-10 PROCEDURE — 99239 HOSP IP/OBS DSCHRG MGMT >30: CPT | Performed by: INTERNAL MEDICINE

## 2022-10-10 PROCEDURE — 97530 THERAPEUTIC ACTIVITIES: CPT | Mod: GP | Performed by: REHABILITATION PRACTITIONER

## 2022-10-10 PROCEDURE — 97535 SELF CARE MNGMENT TRAINING: CPT | Mod: GO

## 2022-10-10 PROCEDURE — 97116 GAIT TRAINING THERAPY: CPT | Mod: GP | Performed by: REHABILITATION PRACTITIONER

## 2022-10-10 PROCEDURE — 250N000013 HC RX MED GY IP 250 OP 250 PS 637: Performed by: INTERNAL MEDICINE

## 2022-10-10 PROCEDURE — 250N000011 HC RX IP 250 OP 636: Performed by: NURSE PRACTITIONER

## 2022-10-10 PROCEDURE — 250N000013 HC RX MED GY IP 250 OP 250 PS 637: Performed by: NURSE PRACTITIONER

## 2022-10-10 RX ORDER — HYDRALAZINE HYDROCHLORIDE 25 MG/1
50 TABLET, FILM COATED ORAL 2 TIMES DAILY
COMMUNITY
Start: 2022-10-10

## 2022-10-10 RX ADMIN — AMOXICILLIN AND CLAVULANATE POTASSIUM 1 TABLET: 875; 125 TABLET, FILM COATED ORAL at 08:17

## 2022-10-10 RX ADMIN — METOPROLOL SUCCINATE 50 MG: 50 TABLET, EXTENDED RELEASE ORAL at 08:17

## 2022-10-10 RX ADMIN — Medication 1 CAPSULE: at 08:17

## 2022-10-10 RX ADMIN — ENOXAPARIN SODIUM 40 MG: 40 INJECTION SUBCUTANEOUS at 08:17

## 2022-10-10 RX ADMIN — LEVOTHYROXINE SODIUM 50 MCG: 50 TABLET ORAL at 08:17

## 2022-10-10 RX ADMIN — ACETAMINOPHEN 1000 MG: 325 TABLET, FILM COATED ORAL at 08:17

## 2022-10-10 ASSESSMENT — ACTIVITIES OF DAILY LIVING (ADL)
ADLS_ACUITY_SCORE: 47

## 2022-10-10 NOTE — PROGRESS NOTES
VS: Vitals stable   O2: Sating 94 % on RA. Lung sounds clear. Denies chest pain and SOB.   Output: Voids spontaneously and adequately., incontinent of urine x1   Last BM: 10/9/22. Bowel sounds active x4. Passing flatus.   Activity: Up with Assist x1  With walker and gait belt.   Skin: Bruised rt  forearm   Pain: Denies pain   Neuro: A&O x4. Denies N/T.   Dressing: NA   Diet: Regular. Needs help ordering   LDA: None   Equipment: IV pole, walker, gait belt and personal belongings.   Plan: Expected discharge to [facility] today   Additional Info:

## 2022-10-10 NOTE — DISCHARGE SUMMARY
"[facility]  Hospitalist Discharge Summary      Date of Admission:  10/6/2022  Date of Discharge:  10/10/2022  Discharging Provider: Darron Cameron MD  Discharge Service: Hospitalist Service, GOLD TEAM 16    Discharge Diagnoses   Acute encephalopathy (delirium)  Hx of Dementia  Suspect community acquired pneumonia.   Hypokalemia    Follow-ups Needed After Discharge   Follow-up Appointments     Follow Up ([facility])      Follow up with primary care provider, Archana Patel, within 7 days for   hospital follow- up.  Review home medications with your primary care.     Appointments on [place] and/or [facility] (with [facility] or [facility]   provider or service). Call [phone] if you haven't heard regarding   these appointments within 7 days of discharge.               Discharge Disposition   Discharged to assisted living  Condition at discharge: Stable      Hospital Course             Ani Porter is a 89 year old woman admitted on 10/6/2022. She has a history of dementia, hypothyroidism, hypertension, CVA, PRABHJOT, diastolic heart failure and was transferred here with acute encephalopathy (worsened delirium) and ?possible early pneumonia vs UTI.      Today's changes: 10/10/2022  Doing well. No new concern/changes  VSS  Son updated.   Discharging to prior living facility (LTC). Long term, son looking for memory care unit.        1)   # Acute encephalopathy likely 2.2 advanced dementia vs toxic metabolic. ? CAP given cough and encephalopathy  # hx of dementia  - On adm: reportedly more confused on admission than usual.  On assessment she was entirely disoriented.  MD spoke with staff at her assisted living facility and they told that its \"hit and miss\" with orientation so it appears she does have significant dementia. Noted no new neurological deficit on exam.  Outside UA borderline for infection with slightly elevated leuk esterase, outside CXR with some mild R basilar " opacities: atelectasis vs pna. CT head with no acute changes.    procal neg. Afebrile. VSS.   UC: no growth     - 10/7: given no clear e.o infection and pt appears almost at baseline, discontinued iv antibiotics. Plan to monitor off antibiotics.  -10/8: however patient is more alert and reported some ongoing cough and said felt better after the antibiotics.   - Plan to complete 5 days course of abx. Started on augmentin.   - Encourage IS.    - PT, OT consultation   - , CC consultation for dispo planning  - Fall precautions. Bed alarm. 1:1 prn for safety.   - Delirium precautions. Watch for sundowning. Ensure good sleep hygiene. Hydration. etc     Son updated. Agrees with the plan.    PT: okay to return to LTC.      2) History of hypertension - Modestly elevated at present likely secondary to missing earlier doses.  - Continue home hydralazine, losartan, metoprolol. PRN hydralazine for sbp>160  - Monitor and titrate as necessary     3) History of COPD   - Continue home albuterol     4) History of GERD  - Continue home famotidine     5) History of PRABHJOT  - CPAP overnight     6) Hypokalemia: K 3.3 10/7  - Kcl 40 meq po x 1.    resolved.            Consultations This Hospital Stay   CARE MANAGEMENT / SOCIAL WORK IP CONSULT  PHYSICAL THERAPY ADULT IP CONSULT  OCCUPATIONAL THERAPY ADULT IP CONSULT  OCCUPATIONAL THERAPY ADULT IP CONSULT    Code Status   No CPR- Do NOT Intubate    Time Spent on this Encounter   IDarron MD, personally saw the patient today and spent greater than 30 minutes discharging this patient.       Darron Cameron MD  [facility]  [address] [place] [id number]  Phone: [phone]  Fax: [phone]  ______________________________________________________________________    Physical Exam   Vital Signs: Temp: (!) 96.2  F (35.7  C) Temp src: Oral BP: (!) 141/59 Pulse: 53   Resp: 16 SpO2: 97 % O2 Device: None (Room air)    Weight: 154 lbs 8.68  oz    General Appearance: Awake, interactive, NAD  HEENT: AT/NC, Anicteric, Moist MM  Neck: Supple.   Respiratory: Normal work of breathing.RA   Cardiovascular: RRR  GI/Abd: Soft. NT. ND. No g/r  Extremities: Distally wwp. No pedal edema.  Neuro: alert. Moving all extremities.   Skin: No acute rash on exposed areas.   Psychiatry: normal affect.        Primary Care Physician   Archana Patel    Discharge Orders      Resume Home Care Services    Please resume previous home care services.    [facility]: [phone], Fax- [phone]     Activity    Your activity upon discharge: activity as tolerated with assist.     Follow Up ([facility])    Follow up with primary care provider, Archana Patel, within 7 days for hospital follow- up.  Review home medications with your primary care.     Appointments on [facility] and/or [facility] (with [facility] or [facility] provider or service). Call [phone] if you haven't heard regarding these appointments within 7 days of discharge.     Reason for your hospital stay    Acute encephalopathy (delirium) ? Pneumonia. Treated with antibiotics. Improving.  H/o Dementia.   BP intermittently high, monitor closely at home. Review with primary care as needed.   Avoid nsaids as able.     Diet    Follow this diet upon discharge: Orders Placed This Encounter      Combination Diet Regular Diet Adult       Significant Results and Procedures   Most Recent 3 CBC's:Recent Labs   Lab Test 10/09/22  0710 10/07/22  0550 10/06/22  1351 11/25/21  0852   WBC  --  5.2 4.9 5.4   HGB  --  11.9 12.8 13.0   MCV  --  92 95 92    213 257 59*     Most Recent 3 BMP's:Recent Labs   Lab Test 10/09/22  0710 10/07/22  0550 10/06/22  1351 11/25/21  0852   NA  --  139 136 134*   POTASSIUM 3.8 3.3* 4.1 4.2  4.2   CHLORIDE  --  105 95* 100   CO2  --  28 27 21*   BUN  --  17 19.6 10   CR 0.76 0.67 0.77 0.73   ANIONGAP  --  6 14 13   CHAMP  --  9.0 9.4 9.3   GLC  --  109* 165* 158*     Most Recent 2 LFT's:Recent  Labs   Lab Test 10/06/22  1351 11/22/21  1634   AST 27 16   ALT 15 12   ALKPHOS 53 55   BILITOTAL 0.7 0.7     Most Recent 3 INR's:Recent Labs   Lab Test 05/06/21  1320 03/02/21  1614   INR 0.93 1.01     Most Recent Urinalysis:Recent Labs   Lab Test 10/06/22  1403 08/23/21  1903 03/02/21  1654   COLOR Yellow   < > Yellow   APPEARANCE Turbid*   < > Cloudy*   URINEGLC Negative   < > Negative   URINEBILI Negative   < > Negative   URINEKETONE Negative   < > Negative   SG 1.016   < > 1.020   UBLD Negative   < > Negative   URINEPH 6.0   < > 6.0   PROTEIN 70*   < > 70 mg/dL*   UROBILINOGEN  --   --  <2.0 E.U./dL   NITRITE Negative   < > Positive*   LEUKEST 25 Claudette/uL*   < > Large*   RBCU 0   < > 0-2   WBCU 2   < > >100*    < > = values in this interval not displayed.     Most Recent ESR & CRP:Recent Labs   Lab Test 10/07/22  0550   CRP <2.9   ,   Results for orders placed or performed during the hospital encounter of 10/06/22   XR Chest 2 Views    Narrative    EXAM: XR CHEST 2 VIEWS  LOCATION: [facility]  DATE/TIME: 10/6/2022 2:13 PM    INDICATION: Altered mental status  COMPARISON: Chest x-ray 11/22/2021      Impression    IMPRESSION: Mild right basilar pulmonary opacities may reflect atelectasis or mild developing infiltrates. The left lung is clear. No pleural effusion. Normal heart size. No overt vascular congestion.   CT Head w/o Contrast    Narrative    EXAM: CT HEAD W/O CONTRAST  LOCATION: [facility]  DATE/TIME: 10/6/2022 2:51 PM    INDICATION: Altered mental status.  COMPARISON: Head CT 07/16/2022  TECHNIQUE: Routine CT Head without IV contrast. Multiplanar reformats. Dose reduction techniques were used.    FINDINGS:  INTRACRANIAL CONTENTS: No intracranial hemorrhage, extraaxial collection, or mass effect.  No CT evidence of acute infarct. Moderate presumed chronic small vessel ischemic changes. Moderate generalized volume loss. No hydrocephalus.     VISUALIZED  ORBITS/SINUSES/MASTOIDS: No intraorbital abnormality. No paranasal sinus mucosal disease. No middle ear or mastoid effusion.    BONES/SOFT TISSUES: No acute abnormality.      Impression    IMPRESSION:  1.  No CT evidence for acute intracranial process.  2.  Brain atrophy and presumed chronic microvascular ischemic changes as above.  3.  No interval change.       Discharge Medications   Current Discharge Medication List      CONTINUE these medications which have CHANGED    Details   hydrALAZINE (APRESOLINE) 25 MG tablet Take 2 tablets (50 mg) by mouth 2 times daily With lunch and at bedtime.    Associated Diagnoses: Asymptomatic hypertensive urgency         CONTINUE these medications which have NOT CHANGED    Details   acetaminophen (TYLENOL) 500 MG tablet Take 1,000 mg by mouth 2 times daily      albuterol (PROAIR HFA/PROVENTIL HFA/VENTOLIN HFA) 108 (90 Base) MCG/ACT inhaler Inhale 1-2 puffs into the lungs every 4 hours as needed      aspirin (ASA) 81 MG EC tablet Take 81 mg by mouth daily (with dinner)       benzonatate (TESSALON) 100 MG capsule Take 100 mg by mouth At Bedtime      calcium carbonate (OS-CHAMP) 1500 (600 Ca) MG tablet Take 600 mg by mouth daily      cetirizine (ZYRTEC) 10 MG tablet Take 10 mg by mouth daily as needed for allergies      cholecalciferol 25 MCG (1000 UT) TABS Take 2,000 Units by mouth daily (with dinner)      DULoxetine (CYMBALTA) 60 MG capsule Take 60 mg by mouth At Bedtime      famotidine (PEPCID) 20 MG tablet Take 20 mg by mouth 2 times daily With lunch and at bedtime.      fluticasone (FLONASE) 50 MCG/ACT nasal spray Spray 1 spray into both nostrils daily      gabapentin (NEURONTIN) 100 MG capsule Take 100 mg by mouth At Bedtime      ibuprofen (ADVIL/MOTRIN) 200 MG tablet Take 200 mg by mouth Take 600 mg every morning and 400 mg every evening      Lactobacillus (PROBIOTIC ACIDOPHILUS PO) Take 1 capsule by mouth daily (with dinner)       levothyroxine (SYNTHROID/LEVOTHROID) 50 MCG  tablet Take 50 mcg by mouth daily before breakfast       loperamide (IMODIUM) 2 MG capsule Take 2 mg by mouth 4 times daily as needed      losartan (COZAAR) 100 MG tablet Take 100 mg by mouth daily (with lunch)      medical cannabis (Patient's own supply) Take 1 Dose by mouth See Admin Instructions (The purpose of this order is to document that the patient reports taking medical cannabis.  This is not a prescription, and is not used to certify that the patient has a qualifying medical condition.)    Tangerine capsule  Take 1 capsule in the morning and 1 hour prior to sleep with milk    Emulsion with supper      melatonin 5 MG tablet Take 5 mg by mouth At Bedtime      metoprolol succinate ER (TOPROL XL) 50 MG 24 hr tablet Take 50 mg by mouth daily      Multiple Vitamins-Minerals (CENTRUM SILVER 50+WOMEN PO) Take 1 tablet by mouth daily (with dinner)       Multiple Vitamins-Minerals (PRESERVISION AREDS) CAPS Take 1 capsule by mouth 2 times daily (with meals) With lunch and supper.      polyethylene glycol-propylene glycol (SYSTANE) 0.4-0.3 % SOLN ophthalmic solution Place 2 drops into both eyes 3 times daily as needed      QUEtiapine (SEROQUEL) 25 MG tablet Take 25 mg by mouth At Bedtime      vitamin B-12 (CYANOCOBALAMIN) 1000 MCG tablet Take 1,000 mcg by mouth daily (with lunch)           Allergies   Allergies   Allergen Reactions     Other Allergy (See Comments) [External Allergen Needs Reconciliation - See Comment] Other (See Comments)     Patient Reports Reaction to FLU SHOT - Egg allergy!!, FLU SHOT - Egg allergy, PN: FLU SHOT - Egg allergy     Citalopram Analogues [Citalopram] Unknown and Other (See Comments)     Per pt and outside records     Demeclocycline Unknown     Doxycycline Unknown and Other (See Comments)     Per pt and outside records;     Erythromycin Unknown and Other (See Comments)     Per pt and outside records;      Erythromycin Ethylsuccinate [Erythromycin] Unknown     Hydromorphone Other (See  Comments)     Depression, PN: Depression, Patient reports having depression     Iodine And Iodide Containing Products [Iodine] Other (See Comments) and Unknown     unknown, Per pt and outside records, pt's son confirms she is allergic to Iodine, PN: unknown     Sulfa (Sulfonamide Antibiotics) [Sulfa Drugs] Unknown, Other (See Comments) and Nausea and Vomiting     Confusion,, Other reaction(s): Confusion, Other (See Comments), confusion     Sulfasalazine Nausea and Vomiting     Terfenadine Unknown and Other (See Comments)     Other reaction(s): *Unknown, Per pt and outside records;     Tetracycline Other (See Comments) and Unknown     Per pt and outside records     Venlafaxine Analogues [Venlafaxine] Unknown     Dizziness and confusion     Cephalosporins Nausea and Vomiting, Itching, Other (See Comments) and Rash     Itching     Prednisone Unknown, Anxiety and Other (See Comments)     Other reaction(s): Confusion, Hallucinations, Confusion, Confusion, hallucination,

## 2022-10-10 NOTE — PROGRESS NOTES
Care Management Discharge Note    Discharge Date: 10/10/2022 at 11:00 a.m.       Discharge Disposition: Return to [facility]  [address] [place] [address]   PH: [phone]  Nurses Station: [phone]  Fax: [phone]    Discharge Services:  Resume previous services, Including home care- PT/OT/RN    [facility]   PH: [phone]  Fax: [phone]  [facility] Fax: [phone]    Discharge DME:  None    Discharge Transportation:  Pt's Ab sanders    Private pay costs discussed: Not applicable    PAS Confirmation Code:  N/A  Patient/family educated on Medicare website which has current facility and service quality ratings:  N/A    Education Provided on the Discharge Plan:  Yes  Persons Notified of Discharge Plans: Charge nurse, bedside nurse, Dr. Cameron, pt's Adalid anderson.  Patient/Family in Agreement with the Plan: yes    Handoff Referral Completed: Yes    Additional Information:  0910: GAYE spoke to pt's Jamie sanders, re: discharge plan. Jamie reported that Ab sanders, will be providing transportation upon discharge, will p/u when pt is ready.    0915: GAYE spoke to Penny at [facility], who confirmed pt's return to [facility] today; provided [facility] fax number to send orders to, and requested that med orders be faxed to [facility].    1010: GAYE spoke pt's Ab sanders, who reported that he would be here ~10:45 a.m. to p/u pt.    1015: GAYE paged Dr. Cameron, requesting that med orders be faxed to [facility], all contact information provided.    1020: Discharge orders faxed to [facility] and [facility]. Dr. Cameron called back GAYE, stated that there are no new medications for pt. GAYE will update [facility].    1025: GAYE spoke to Penny at [facility], who confirmed that orders were received. GAYE updated Penny that there are no new medications, med orders were not faxed to [facility]; all home meds listed on discharge orders. Penny requested PT/OT notes/eval/recs.    1040: GAYE faxed all  PT/OT notes to [facility].    1045: GAYE spoke to Paloma at [facility], who reported that discharge orders were not received; requested that discharge orders be faxed to [facility] (Fax: [phone]). Paloma agreed to call SW if orders are not received.    1053: GAYE faxed discharge orders to the above fax number, per Paloma's request (ATTN: [facility]).    1115: [facility] called GAYE, requested discharge orders to [facility] for home RN/PT/OT services.    1125:  faxed updated discharge orders to [facility] (Fax: [phone]).    1530: GAYE received a call from Paloma at [facility], stating that orders have still not been received. Paloma stated that orders can be faxed directly to her at [phone]. GAYE hand-faxed pt's discharge orders.        KIA Huddleston, LSW  5 Ortho   PHONE: [phone]  Pager: [phone]

## 2022-10-10 NOTE — PLAN OF CARE
Goal Outcome Evaluation:      Plan of Care Reviewed With: patient    Overall Patient Progress: improvingOverall Patient Progress: improving    Outcome Evaluation: Pt has been somewhat confused this shift, baseline for pt with dementia. Pt had high blood pressure which came down after scheduled medication. MD aware of pt high blood pressure, is baseline with medications helping to control. Pt is discharging back to LTC this shift and will have help in LCT with daily activities.    BP (!) 141/59 (BP Location: Left arm)   Pulse 53   Temp (!) 96.2  F (35.7  C) (Oral)   Resp 16   Wt 70.1 kg (154 lb 8.7 oz)   SpO2 97%   BMI 28.27 kg/m      AVS discussed with pt and family. Medications to be provided in LTC facility. No questions at this time.

## 2022-10-10 NOTE — PLAN OF CARE
Discharge Planner PT   Patient plan for discharge: LTC   Current status: PT: pt needing CGA with extra time for all bed mob slight assist for STS with V.c for hand placement for safey. pt needing cont CGA to slight assist for standing balance for first few sec due to mild instability. pt needing use of WW for all standing and amb. ( see below. ) pt up in tall back chair at end of session all needs met. chair alarm on.  PT pt demo amb up to 10'x 2 and 50'x 1 with WW. all amb at slow pace with WW for standing support needing cGA for all. pt stating limited by weakness and fatigue with increased pain.  Barriers to return to prior living situation: weakness fatigue.   Recommendations for discharge: per PT eval, return to LTC   Rationale for recommendations: pt has progressed well, LTC able to provide assist level.   At time of discharge pt met 0/1 goal  No DME needs at time of discharge        Entered by: Dominick Varma PTA 10/10/2022 11:57 AM

## 2022-10-10 NOTE — PLAN OF CARE
A/Ox4 with some confusion off and on. VSS on Room Air. Denied SOB, CP, Cough, N/V, N/T, Dizziness, Headache. Pt denied pain this shift. Regular diet, thin liquids, takes pills whole. Continent of B/B this shift, LBM 10/9/22. Assist of 1 with walker and gait belt-pt refused gait belt. Skin is intact with bruise on R forearm from old PIV site. No dressings. No LDA's. Call light within reach, able to make needs known. Appears to be sleeping during rounds. Continue POC with expected discharge on 10/10/22 to [facility].

## 2022-10-11 ENCOUNTER — PATIENT OUTREACH (OUTPATIENT)
Dept: CARE COORDINATION | Facility: CLINIC | Age: 87
End: 2022-10-11

## 2022-10-11 LAB
BACTERIA BLD CULT: NO GROWTH
BACTERIA BLD CULT: NO GROWTH

## 2022-10-11 NOTE — PROGRESS NOTES
[facility] [place]    Background: Transitional Care Management program auto-identified and prompting a chart review by [facility] team.    Assessment: Upon chart review, [facility] Team member will cancel/close this episode of Transitional Care Management program due to reason below:    Patient has discharged to a Memory Care, Nursing Home, Assisted Living or Group Home where patient is receiving on-site support with their daily cares, including support with hospital follow up plan.     Discharged to assisted living    Plan: Transitional Care Management episode closed per reason above.      JOSE ALEJANDRO Wisdom  [facility]    *Connected Care Resource Team does NOT follow patient ongoing. Referrals are identified based on internal discharge reports and the outreach is to ensure patient has an understanding of their discharge instructions.

## 2022-10-14 ENCOUNTER — MEDICAL CORRESPONDENCE (OUTPATIENT)
Dept: HEALTH INFORMATION MANAGEMENT | Facility: CLINIC | Age: 87
End: 2022-10-14

## 2022-10-17 ENCOUNTER — DOCUMENTATION ONLY (OUTPATIENT)
Dept: OTHER | Facility: CLINIC | Age: 87
End: 2022-10-17

## 2022-10-20 ENCOUNTER — DOCUMENTATION ONLY (OUTPATIENT)
Dept: OTHER | Facility: CLINIC | Age: 87
End: 2022-10-20

## 2022-10-21 NOTE — ED NOTES
Pt a/o with occ forgetfulness here with caregiver who states pt has increased confusion today. Her walking has been off for a week. Has had same sx in past when had UTI   SUBJECTIVE  Early AM, pt had abdominal discomfort, bladder scan showed ~700cc. Pt straight cath'd with resolution of abdominal discomfort.    Pt seen in bed this AM. states feeling better than yesterday, breathing is easier. Additionally states that pain is well-controlled with current regimen. Asked pt about not eating hospital meal ydy, she states that she mistook the tofu for eggs. She is fine eating the hospital food now that she knows her diet is specifically vegan. States that she still has an appetite. Patient states she has had difficulty urinating, attributes this to being constipated, as she has not had a BM since arriving here.     OBJECTIVE:  ICU Vital Signs Last 24 Hrs  T(C): 36.6 (21 Oct 2022 04:14), Max: 36.8 (20 Oct 2022 11:44)  T(F): 97.8 (21 Oct 2022 04:14), Max: 98.2 (20 Oct 2022 11:44)  HR: 111 (21 Oct 2022 04:14) (109 - 120)  BP: 117/78 (21 Oct 2022 04:14) (109/73 - 118/83)  BP(mean): --  ABP: --  ABP(mean): --  RR: 20 (21 Oct 2022 04:14) (19 - 24)  SpO2: 97% (21 Oct 2022 04:14) (94% - 98%)    O2 Parameters below as of 21 Oct 2022 04:14  Patient On (Oxygen Delivery Method): nasal cannula, high flow              10-20 @ 07:01  -  10-21 @ 07:00  --------------------------------------------------------  IN: 720 mL / OUT: 1650 mL / NET: -930 mL      CAPILLARY BLOOD GLUCOSE          PHYSICAL EXAM:  General: Well-groomed, NAD, laying in bed, on HFNC 70% 50L/min  HEENT: PERRLA, EOMI, non-icteric  Neck:  symmetric,  JVD absent  Respiratory: Coarse breath sounds throughout, no Resp distress; some accessory muscle use  Cardiovascular:  RRR, no murmurs/rubs/gallops  Abdomen: Soft, NT, ND  Extremities: No edema noted  Skin: No rashes or lesions noted  Neurological: Sensation grossly intact; strength 5/5 in all extremities.  Psychiatry: AOx3, appropriate insight/judgement, appropriate affect, recent/remote memory intact    PRN Meds:  ALBUTerol    90 MICROgram(s) HFA Inhaler 2 Puff(s) Inhalation every 4 hours PRN  aluminum hydroxide/magnesium hydroxide/simethicone Suspension 30 milliLiter(s) Oral every 4 hours PRN  benzonatate 100 milliGRAM(s) Oral every 8 hours PRN  bisacodyl 5 milliGRAM(s) Oral daily PRN  guaifenesin/dextromethorphan Oral Liquid 10 milliLiter(s) Oral every 4 hours PRN  HYDROmorphone  Injectable 1 milliGRAM(s) IV Push every 4 hours PRN  HYDROmorphone  Injectable 2 milliGRAM(s) IV Push every 4 hours PRN  megestrol 40 milliGRAM(s) Oral daily PRN  melatonin 3 milliGRAM(s) Oral at bedtime PRN  ondansetron Injectable 4 milliGRAM(s) IV Push every 8 hours PRN      LABS:                        7.9    6.52  )-----------( 107      ( 20 Oct 2022 07:00 )             24.8     Hgb Trend: 7.9<--, 7.3<--, 7.7<--  10-20    131<L>  |  97  |  22  ----------------------------<  108<H>  5.0   |  25  |  0.84    Ca    8.8      20 Oct 2022 07:00  Phos  3.1     10-20  Mg     2.4     10-20    TPro  7.9  /  Alb  3.2<L>  /  TBili  0.2  /  DBili  x   /  AST  31  /  ALT  38  /  AlkPhos  72  10-20    Creatinine Trend: 0.84<--, 0.94<--, 0.97<--  PT/INR - ( 20 Oct 2022 07:00 )   PT: 12.1 sec;   INR: 1.05 ratio         PTT - ( 20 Oct 2022 07:00 )  PTT:26.7 sec  Urinalysis Basic - ( 21 Oct 2022 07:49 )    Color: Light Yellow / Appearance: Clear / S.011 / pH: x  Gluc: x / Ketone: Negative  / Bili: Negative / Urobili: Negative   Blood: x / Protein: Negative / Nitrite: Negative   Leuk Esterase: Negative / RBC: x / WBC x   Sq Epi: x / Non Sq Epi: x / Bacteria: x            MICROBIOLOGY:     Culture - Blood (collected 18 Oct 2022 17:45)  Source: .Blood Blood-Peripheral  Preliminary Report (19 Oct 2022 23:02):    No growth to date.    Culture - Blood (collected 18 Oct 2022 17:25)  Source: .Blood Blood-Peripheral  Preliminary Report (19 Oct 2022 23:02):    No growth to date.        RADIOLOGY:  [ ] Reviewed and interpreted by me    EKG:

## 2023-01-20 ENCOUNTER — LAB REQUISITION (OUTPATIENT)
Dept: LAB | Facility: CLINIC | Age: 88
End: 2023-01-20
Payer: MEDICARE

## 2023-01-20 DIAGNOSIS — Z03.818 ENCOUNTER FOR OBSERVATION FOR SUSPECTED EXPOSURE TO OTHER BIOLOGICAL AGENTS RULED OUT: ICD-10-CM

## 2023-01-20 PROCEDURE — U0005 INFEC AGEN DETEC AMPLI PROBE: HCPCS | Mod: ORL | Performed by: FAMILY MEDICINE

## 2023-01-21 LAB — SARS-COV-2 RNA RESP QL NAA+PROBE: POSITIVE

## 2023-01-24 ENCOUNTER — LAB REQUISITION (OUTPATIENT)
Dept: LAB | Facility: CLINIC | Age: 88
End: 2023-01-24
Payer: MEDICARE

## 2023-01-24 DIAGNOSIS — I50.9 HEART FAILURE, UNSPECIFIED (H): ICD-10-CM

## 2023-01-24 DIAGNOSIS — I10 ESSENTIAL (PRIMARY) HYPERTENSION: ICD-10-CM

## 2023-01-24 DIAGNOSIS — F03.90 UNSPECIFIED DEMENTIA, UNSPECIFIED SEVERITY, WITHOUT BEHAVIORAL DISTURBANCE, PSYCHOTIC DISTURBANCE, MOOD DISTURBANCE, AND ANXIETY (H): ICD-10-CM

## 2023-01-26 LAB
ANION GAP SERPL CALCULATED.3IONS-SCNC: 15 MMOL/L (ref 7–15)
BASOPHILS # BLD AUTO: 0 10E3/UL (ref 0–0.2)
BASOPHILS NFR BLD AUTO: 1 %
BUN SERPL-MCNC: 18.4 MG/DL (ref 8–23)
CALCIUM SERPL-MCNC: 8.9 MG/DL (ref 8.2–9.6)
CHLORIDE SERPL-SCNC: 95 MMOL/L (ref 98–107)
CREAT SERPL-MCNC: 0.86 MG/DL (ref 0.51–0.95)
DEPRECATED HCO3 PLAS-SCNC: 22 MMOL/L (ref 22–29)
EOSINOPHIL # BLD AUTO: 0.1 10E3/UL (ref 0–0.7)
EOSINOPHIL NFR BLD AUTO: 2 %
ERYTHROCYTE [DISTWIDTH] IN BLOOD BY AUTOMATED COUNT: 13.3 % (ref 10–15)
GFR SERPL CREATININE-BSD FRML MDRD: 63 ML/MIN/1.73M2
GLUCOSE SERPL-MCNC: 173 MG/DL (ref 70–99)
HCT VFR BLD AUTO: 35.8 % (ref 35–47)
HGB BLD-MCNC: 11.6 G/DL (ref 11.7–15.7)
IMM GRANULOCYTES # BLD: 0 10E3/UL
IMM GRANULOCYTES NFR BLD: 0 %
LYMPHOCYTES # BLD AUTO: 1.1 10E3/UL (ref 0.8–5.3)
LYMPHOCYTES NFR BLD AUTO: 26 %
MCH RBC QN AUTO: 30.6 PG (ref 26.5–33)
MCHC RBC AUTO-ENTMCNC: 32.4 G/DL (ref 31.5–36.5)
MCV RBC AUTO: 95 FL (ref 78–100)
MONOCYTES # BLD AUTO: 0.4 10E3/UL (ref 0–1.3)
MONOCYTES NFR BLD AUTO: 9 %
NEUTROPHILS # BLD AUTO: 2.7 10E3/UL (ref 1.6–8.3)
NEUTROPHILS NFR BLD AUTO: 62 %
NRBC # BLD AUTO: 0 10E3/UL
NRBC BLD AUTO-RTO: 0 /100
NT-PROBNP SERPL-MCNC: 715 PG/ML (ref 0–1800)
PLATELET # BLD AUTO: 203 10E3/UL (ref 150–450)
POTASSIUM SERPL-SCNC: 4 MMOL/L (ref 3.4–5.3)
RBC # BLD AUTO: 3.79 10E6/UL (ref 3.8–5.2)
SODIUM SERPL-SCNC: 132 MMOL/L (ref 136–145)
TSH SERPL DL<=0.005 MIU/L-ACNC: 1.24 UIU/ML (ref 0.3–4.2)
VIT B12 SERPL-MCNC: 832 PG/ML (ref 232–1245)
WBC # BLD AUTO: 4.3 10E3/UL (ref 4–11)

## 2023-01-26 PROCEDURE — 85025 COMPLETE CBC W/AUTO DIFF WBC: CPT | Mod: ORL | Performed by: PHYSICIAN ASSISTANT

## 2023-01-26 PROCEDURE — 80048 BASIC METABOLIC PNL TOTAL CA: CPT | Mod: ORL | Performed by: PHYSICIAN ASSISTANT

## 2023-01-26 PROCEDURE — 83880 ASSAY OF NATRIURETIC PEPTIDE: CPT | Mod: ORL | Performed by: PHYSICIAN ASSISTANT

## 2023-01-26 PROCEDURE — 84443 ASSAY THYROID STIM HORMONE: CPT | Mod: ORL | Performed by: PHYSICIAN ASSISTANT

## 2023-01-26 PROCEDURE — 82607 VITAMIN B-12: CPT | Mod: ORL | Performed by: PHYSICIAN ASSISTANT

## 2023-01-26 PROCEDURE — P9604 ONE-WAY ALLOW PRORATED TRIP: HCPCS | Mod: ORL | Performed by: PHYSICIAN ASSISTANT

## 2023-01-26 PROCEDURE — 36415 COLL VENOUS BLD VENIPUNCTURE: CPT | Mod: ORL | Performed by: PHYSICIAN ASSISTANT

## 2023-07-11 ENCOUNTER — LAB REQUISITION (OUTPATIENT)
Dept: LAB | Facility: CLINIC | Age: 88
End: 2023-07-11
Payer: MEDICARE

## 2023-07-11 DIAGNOSIS — E03.9 HYPOTHYROIDISM, UNSPECIFIED: ICD-10-CM

## 2023-07-11 DIAGNOSIS — I10 ESSENTIAL (PRIMARY) HYPERTENSION: ICD-10-CM

## 2023-07-13 LAB
ANION GAP SERPL CALCULATED.3IONS-SCNC: 12 MMOL/L (ref 7–15)
BASOPHILS # BLD AUTO: 0 10E3/UL (ref 0–0.2)
BASOPHILS NFR BLD AUTO: 0 %
BUN SERPL-MCNC: 22.2 MG/DL (ref 8–23)
CALCIUM SERPL-MCNC: 9.1 MG/DL (ref 8.2–9.6)
CHLORIDE SERPL-SCNC: 96 MMOL/L (ref 98–107)
CREAT SERPL-MCNC: 0.85 MG/DL (ref 0.51–0.95)
DEPRECATED HCO3 PLAS-SCNC: 26 MMOL/L (ref 22–29)
EOSINOPHIL # BLD AUTO: 0.1 10E3/UL (ref 0–0.7)
EOSINOPHIL NFR BLD AUTO: 3 %
ERYTHROCYTE [DISTWIDTH] IN BLOOD BY AUTOMATED COUNT: 13.3 % (ref 10–15)
GFR SERPL CREATININE-BSD FRML MDRD: 63 ML/MIN/1.73M2
GLUCOSE SERPL-MCNC: 84 MG/DL (ref 70–99)
HCT VFR BLD AUTO: 35.9 % (ref 35–47)
HGB BLD-MCNC: 11.8 G/DL (ref 11.7–15.7)
IMM GRANULOCYTES # BLD: 0 10E3/UL
IMM GRANULOCYTES NFR BLD: 1 %
LYMPHOCYTES # BLD AUTO: 1.5 10E3/UL (ref 0.8–5.3)
LYMPHOCYTES NFR BLD AUTO: 29 %
MCH RBC QN AUTO: 31.6 PG (ref 26.5–33)
MCHC RBC AUTO-ENTMCNC: 32.9 G/DL (ref 31.5–36.5)
MCV RBC AUTO: 96 FL (ref 78–100)
MONOCYTES # BLD AUTO: 0.5 10E3/UL (ref 0–1.3)
MONOCYTES NFR BLD AUTO: 10 %
NEUTROPHILS # BLD AUTO: 2.9 10E3/UL (ref 1.6–8.3)
NEUTROPHILS NFR BLD AUTO: 57 %
NRBC # BLD AUTO: 0 10E3/UL
NRBC BLD AUTO-RTO: 0 /100
PLATELET # BLD AUTO: 201 10E3/UL (ref 150–450)
POTASSIUM SERPL-SCNC: 3.8 MMOL/L (ref 3.4–5.3)
RBC # BLD AUTO: 3.73 10E6/UL (ref 3.8–5.2)
SODIUM SERPL-SCNC: 134 MMOL/L (ref 136–145)
TSH SERPL DL<=0.005 MIU/L-ACNC: 2.41 UIU/ML (ref 0.3–4.2)
WBC # BLD AUTO: 5.1 10E3/UL (ref 4–11)

## 2023-07-13 PROCEDURE — 80048 BASIC METABOLIC PNL TOTAL CA: CPT | Mod: ORL | Performed by: FAMILY MEDICINE

## 2023-07-13 PROCEDURE — 36415 COLL VENOUS BLD VENIPUNCTURE: CPT | Mod: ORL | Performed by: FAMILY MEDICINE

## 2023-07-13 PROCEDURE — 84443 ASSAY THYROID STIM HORMONE: CPT | Mod: ORL | Performed by: FAMILY MEDICINE

## 2023-07-13 PROCEDURE — 85025 COMPLETE CBC W/AUTO DIFF WBC: CPT | Mod: ORL | Performed by: FAMILY MEDICINE

## 2023-07-13 PROCEDURE — P9604 ONE-WAY ALLOW PRORATED TRIP: HCPCS | Mod: ORL | Performed by: FAMILY MEDICINE

## 2023-09-11 ENCOUNTER — LAB REQUISITION (OUTPATIENT)
Dept: LAB | Facility: CLINIC | Age: 88
End: 2023-09-11
Payer: MEDICARE

## 2023-09-11 DIAGNOSIS — R41.0 DISORIENTATION, UNSPECIFIED: ICD-10-CM

## 2023-09-11 LAB
ALBUMIN UR-MCNC: 100 MG/DL
APPEARANCE UR: ABNORMAL
BACTERIA #/AREA URNS HPF: ABNORMAL /HPF
BILIRUB UR QL STRIP: NEGATIVE
COLOR UR AUTO: YELLOW
GLUCOSE UR STRIP-MCNC: NEGATIVE MG/DL
HGB UR QL STRIP: NEGATIVE
KETONES UR STRIP-MCNC: NEGATIVE MG/DL
LEUKOCYTE ESTERASE UR QL STRIP: ABNORMAL
MUCOUS THREADS #/AREA URNS LPF: PRESENT /LPF
NITRATE UR QL: NEGATIVE
PH UR STRIP: 6 [PH] (ref 5–7)
RBC URINE: 4 /HPF
SP GR UR STRIP: 1.02 (ref 1–1.03)
SQUAMOUS EPITHELIAL: 4 /HPF
UROBILINOGEN UR STRIP-MCNC: NORMAL MG/DL
WBC CLUMPS #/AREA URNS HPF: PRESENT /HPF
WBC URINE: 147 /HPF

## 2023-09-11 PROCEDURE — 81001 URINALYSIS AUTO W/SCOPE: CPT | Mod: ORL | Performed by: PHYSICIAN ASSISTANT

## 2023-09-11 PROCEDURE — 87086 URINE CULTURE/COLONY COUNT: CPT | Mod: ORL | Performed by: PHYSICIAN ASSISTANT

## 2023-09-13 LAB — BACTERIA UR CULT: ABNORMAL

## 2023-10-30 ENCOUNTER — LAB REQUISITION (OUTPATIENT)
Dept: LAB | Facility: CLINIC | Age: 88
End: 2023-10-30
Payer: MEDICARE

## 2023-10-30 DIAGNOSIS — R53.1 WEAKNESS: ICD-10-CM

## 2023-10-30 LAB
ALBUMIN UR-MCNC: 100 MG/DL
APPEARANCE UR: ABNORMAL
BACTERIA #/AREA URNS HPF: ABNORMAL /HPF
BILIRUB UR QL STRIP: NEGATIVE
COLOR UR AUTO: YELLOW
GLUCOSE UR STRIP-MCNC: NEGATIVE MG/DL
HGB UR QL STRIP: ABNORMAL
KETONES UR STRIP-MCNC: NEGATIVE MG/DL
LEUKOCYTE ESTERASE UR QL STRIP: ABNORMAL
MUCOUS THREADS #/AREA URNS LPF: PRESENT /LPF
NITRATE UR QL: POSITIVE
PH UR STRIP: 6 [PH] (ref 5–7)
RBC URINE: 4 /HPF
SP GR UR STRIP: 1.02 (ref 1–1.03)
SQUAMOUS EPITHELIAL: 3 /HPF
UROBILINOGEN UR STRIP-MCNC: NORMAL MG/DL
WBC URINE: >182 /HPF

## 2023-10-30 PROCEDURE — 87086 URINE CULTURE/COLONY COUNT: CPT | Mod: ORL | Performed by: PHYSICIAN ASSISTANT

## 2023-10-30 PROCEDURE — 81001 URINALYSIS AUTO W/SCOPE: CPT | Mod: ORL | Performed by: PHYSICIAN ASSISTANT

## 2023-11-02 LAB — BACTERIA UR CULT: ABNORMAL

## 2024-04-10 ENCOUNTER — LAB REQUISITION (OUTPATIENT)
Dept: LAB | Facility: CLINIC | Age: 89
End: 2024-04-10
Payer: MEDICARE

## 2024-04-10 DIAGNOSIS — I10 ESSENTIAL (PRIMARY) HYPERTENSION: ICD-10-CM

## 2024-04-10 DIAGNOSIS — E03.9 HYPOTHYROIDISM, UNSPECIFIED: ICD-10-CM

## 2024-04-11 LAB
ANION GAP SERPL CALCULATED.3IONS-SCNC: 11 MMOL/L (ref 7–15)
BASOPHILS # BLD AUTO: 0 10E3/UL (ref 0–0.2)
BASOPHILS NFR BLD AUTO: 1 %
BUN SERPL-MCNC: 25.3 MG/DL (ref 8–23)
CALCIUM SERPL-MCNC: 9.2 MG/DL (ref 8.2–9.6)
CHLORIDE SERPL-SCNC: 102 MMOL/L (ref 98–107)
CREAT SERPL-MCNC: 0.96 MG/DL (ref 0.51–0.95)
DEPRECATED HCO3 PLAS-SCNC: 26 MMOL/L (ref 22–29)
EGFRCR SERPLBLD CKD-EPI 2021: 55 ML/MIN/1.73M2
EOSINOPHIL # BLD AUTO: 0.1 10E3/UL (ref 0–0.7)
EOSINOPHIL NFR BLD AUTO: 3 %
ERYTHROCYTE [DISTWIDTH] IN BLOOD BY AUTOMATED COUNT: 13.8 % (ref 10–15)
GLUCOSE SERPL-MCNC: 94 MG/DL (ref 70–99)
HCT VFR BLD AUTO: 37.2 % (ref 35–47)
HGB BLD-MCNC: 12 G/DL (ref 11.7–15.7)
IMM GRANULOCYTES # BLD: 0 10E3/UL
IMM GRANULOCYTES NFR BLD: 0 %
LYMPHOCYTES # BLD AUTO: 1.5 10E3/UL (ref 0.8–5.3)
LYMPHOCYTES NFR BLD AUTO: 32 %
MCH RBC QN AUTO: 31.1 PG (ref 26.5–33)
MCHC RBC AUTO-ENTMCNC: 32.3 G/DL (ref 31.5–36.5)
MCV RBC AUTO: 96 FL (ref 78–100)
MONOCYTES # BLD AUTO: 0.5 10E3/UL (ref 0–1.3)
MONOCYTES NFR BLD AUTO: 11 %
NEUTROPHILS # BLD AUTO: 2.5 10E3/UL (ref 1.6–8.3)
NEUTROPHILS NFR BLD AUTO: 53 %
NRBC # BLD AUTO: 0 10E3/UL
NRBC BLD AUTO-RTO: 0 /100
PLATELET # BLD AUTO: 214 10E3/UL (ref 150–450)
POTASSIUM SERPL-SCNC: 4.2 MMOL/L (ref 3.4–5.3)
RBC # BLD AUTO: 3.86 10E6/UL (ref 3.8–5.2)
SODIUM SERPL-SCNC: 139 MMOL/L (ref 135–145)
TSH SERPL DL<=0.005 MIU/L-ACNC: 2.18 UIU/ML (ref 0.3–4.2)
WBC # BLD AUTO: 4.8 10E3/UL (ref 4–11)

## 2024-04-11 PROCEDURE — 36415 COLL VENOUS BLD VENIPUNCTURE: CPT | Mod: ORL | Performed by: PHYSICIAN ASSISTANT

## 2024-04-11 PROCEDURE — 84443 ASSAY THYROID STIM HORMONE: CPT | Mod: ORL | Performed by: PHYSICIAN ASSISTANT

## 2024-04-11 PROCEDURE — 80048 BASIC METABOLIC PNL TOTAL CA: CPT | Mod: ORL | Performed by: PHYSICIAN ASSISTANT

## 2024-04-11 PROCEDURE — P9604 ONE-WAY ALLOW PRORATED TRIP: HCPCS | Mod: ORL | Performed by: PHYSICIAN ASSISTANT

## 2024-04-11 PROCEDURE — 85025 COMPLETE CBC W/AUTO DIFF WBC: CPT | Mod: ORL | Performed by: PHYSICIAN ASSISTANT

## 2024-06-12 PROCEDURE — 81001 URINALYSIS AUTO W/SCOPE: CPT | Mod: ORL | Performed by: FAMILY MEDICINE

## 2024-06-12 PROCEDURE — 87086 URINE CULTURE/COLONY COUNT: CPT | Mod: ORL | Performed by: FAMILY MEDICINE

## 2024-06-13 ENCOUNTER — LAB REQUISITION (OUTPATIENT)
Dept: LAB | Facility: CLINIC | Age: 89
End: 2024-06-13
Payer: MEDICARE

## 2024-06-13 DIAGNOSIS — R30.0 DYSURIA: ICD-10-CM

## 2024-06-13 LAB
ALBUMIN UR-MCNC: 70 MG/DL
APPEARANCE UR: ABNORMAL
BILIRUB UR QL STRIP: NEGATIVE
COLOR UR AUTO: ABNORMAL
GLUCOSE UR STRIP-MCNC: NEGATIVE MG/DL
HGB UR QL STRIP: ABNORMAL
KETONES UR STRIP-MCNC: NEGATIVE MG/DL
LEUKOCYTE ESTERASE UR QL STRIP: ABNORMAL
MUCOUS THREADS #/AREA URNS LPF: PRESENT /LPF
NITRATE UR QL: NEGATIVE
PH UR STRIP: 6.5 [PH] (ref 5–7)
RBC URINE: 24 /HPF
SP GR UR STRIP: 1.02 (ref 1–1.03)
SQUAMOUS EPITHELIAL: 3 /HPF
UROBILINOGEN UR STRIP-MCNC: NORMAL MG/DL
WBC CLUMPS #/AREA URNS HPF: PRESENT /HPF
WBC URINE: >182 /HPF

## 2024-06-14 LAB — BACTERIA UR CULT: NORMAL

## 2024-09-09 ENCOUNTER — LAB REQUISITION (OUTPATIENT)
Dept: LAB | Facility: CLINIC | Age: 89
End: 2024-09-09
Payer: MEDICARE

## 2024-09-09 DIAGNOSIS — I10 ESSENTIAL (PRIMARY) HYPERTENSION: ICD-10-CM

## 2024-09-09 DIAGNOSIS — E03.9 HYPOTHYROIDISM, UNSPECIFIED: ICD-10-CM

## 2024-09-11 LAB
ALBUMIN SERPL BCG-MCNC: 3.9 G/DL (ref 3.5–5.2)
ALP SERPL-CCNC: 43 U/L (ref 40–150)
ALT SERPL W P-5'-P-CCNC: 10 U/L (ref 0–50)
ANION GAP SERPL CALCULATED.3IONS-SCNC: 12 MMOL/L (ref 7–15)
AST SERPL W P-5'-P-CCNC: 21 U/L (ref 0–45)
BASOPHILS # BLD AUTO: 0 10E3/UL (ref 0–0.2)
BASOPHILS NFR BLD AUTO: 1 %
BILIRUB SERPL-MCNC: 0.5 MG/DL
BUN SERPL-MCNC: 19.6 MG/DL (ref 8–23)
CALCIUM SERPL-MCNC: 8.9 MG/DL (ref 8.8–10.4)
CHLORIDE SERPL-SCNC: 101 MMOL/L (ref 98–107)
CREAT SERPL-MCNC: 0.9 MG/DL (ref 0.51–0.95)
EGFRCR SERPLBLD CKD-EPI 2021: 59 ML/MIN/1.73M2
EOSINOPHIL # BLD AUTO: 0.1 10E3/UL (ref 0–0.7)
EOSINOPHIL NFR BLD AUTO: 2 %
ERYTHROCYTE [DISTWIDTH] IN BLOOD BY AUTOMATED COUNT: 13.2 % (ref 10–15)
GLUCOSE SERPL-MCNC: 193 MG/DL (ref 70–99)
HCO3 SERPL-SCNC: 22 MMOL/L (ref 22–29)
HCT VFR BLD AUTO: 38 % (ref 35–47)
HGB BLD-MCNC: 12.4 G/DL (ref 11.7–15.7)
IMM GRANULOCYTES # BLD: 0 10E3/UL
IMM GRANULOCYTES NFR BLD: 0 %
LYMPHOCYTES # BLD AUTO: 1.3 10E3/UL (ref 0.8–5.3)
LYMPHOCYTES NFR BLD AUTO: 30 %
MCH RBC QN AUTO: 31.1 PG (ref 26.5–33)
MCHC RBC AUTO-ENTMCNC: 32.6 G/DL (ref 31.5–36.5)
MCV RBC AUTO: 95 FL (ref 78–100)
MONOCYTES # BLD AUTO: 0.3 10E3/UL (ref 0–1.3)
MONOCYTES NFR BLD AUTO: 7 %
NEUTROPHILS # BLD AUTO: 2.5 10E3/UL (ref 1.6–8.3)
NEUTROPHILS NFR BLD AUTO: 60 %
NRBC # BLD AUTO: 0 10E3/UL
NRBC BLD AUTO-RTO: 0 /100
PLATELET # BLD AUTO: 229 10E3/UL (ref 150–450)
POTASSIUM SERPL-SCNC: 3.7 MMOL/L (ref 3.4–5.3)
PROT SERPL-MCNC: 6.2 G/DL (ref 6.4–8.3)
RBC # BLD AUTO: 3.99 10E6/UL (ref 3.8–5.2)
SODIUM SERPL-SCNC: 135 MMOL/L (ref 135–145)
TSH SERPL DL<=0.005 MIU/L-ACNC: 1.64 UIU/ML (ref 0.3–4.2)
WBC # BLD AUTO: 4.2 10E3/UL (ref 4–11)

## 2024-09-11 PROCEDURE — 36415 COLL VENOUS BLD VENIPUNCTURE: CPT | Mod: ORL | Performed by: REGISTERED NURSE

## 2024-09-11 PROCEDURE — 80053 COMPREHEN METABOLIC PANEL: CPT | Mod: ORL | Performed by: REGISTERED NURSE

## 2024-09-11 PROCEDURE — P9604 ONE-WAY ALLOW PRORATED TRIP: HCPCS | Mod: ORL | Performed by: REGISTERED NURSE

## 2024-09-11 PROCEDURE — 85025 COMPLETE CBC W/AUTO DIFF WBC: CPT | Mod: ORL | Performed by: REGISTERED NURSE

## 2024-09-11 PROCEDURE — 84443 ASSAY THYROID STIM HORMONE: CPT | Mod: ORL | Performed by: REGISTERED NURSE

## 2024-10-11 ENCOUNTER — LAB REQUISITION (OUTPATIENT)
Dept: LAB | Facility: CLINIC | Age: 89
End: 2024-10-11
Payer: MEDICARE

## 2024-10-11 DIAGNOSIS — N39.0 URINARY TRACT INFECTION, SITE NOT SPECIFIED: ICD-10-CM

## 2024-10-11 LAB
ALBUMIN UR-MCNC: 70 MG/DL
APPEARANCE UR: ABNORMAL
BACTERIA #/AREA URNS HPF: ABNORMAL /HPF
BILIRUB UR QL STRIP: NEGATIVE
COLOR UR AUTO: YELLOW
GLUCOSE UR STRIP-MCNC: NEGATIVE MG/DL
HGB UR QL STRIP: NEGATIVE
KETONES UR STRIP-MCNC: NEGATIVE MG/DL
LEUKOCYTE ESTERASE UR QL STRIP: ABNORMAL
MUCOUS THREADS #/AREA URNS LPF: PRESENT /LPF
NITRATE UR QL: POSITIVE
PH UR STRIP: 6 [PH] (ref 5–7)
RBC URINE: 1 /HPF
SP GR UR STRIP: 1.02 (ref 1–1.03)
SQUAMOUS EPITHELIAL: 33 /HPF
UROBILINOGEN UR STRIP-MCNC: 2 MG/DL
WBC URINE: 18 /HPF

## 2024-10-11 PROCEDURE — 81001 URINALYSIS AUTO W/SCOPE: CPT | Mod: ORL | Performed by: REGISTERED NURSE

## 2024-10-11 PROCEDURE — 87186 SC STD MICRODIL/AGAR DIL: CPT | Mod: ORL | Performed by: REGISTERED NURSE

## 2024-10-14 LAB
BACTERIA UR CULT: ABNORMAL
BACTERIA UR CULT: ABNORMAL